# Patient Record
Sex: FEMALE | Race: BLACK OR AFRICAN AMERICAN | NOT HISPANIC OR LATINO | Employment: FULL TIME | URBAN - METROPOLITAN AREA
[De-identification: names, ages, dates, MRNs, and addresses within clinical notes are randomized per-mention and may not be internally consistent; named-entity substitution may affect disease eponyms.]

---

## 2017-10-16 RX ORDER — FLUCONAZOLE 150 MG/1
150 TABLET ORAL DAILY
Qty: 1 TABLET | Refills: 0 | OUTPATIENT
Start: 2017-10-16 | End: 2017-10-17

## 2018-02-19 ENCOUNTER — OFFICE VISIT (OUTPATIENT)
Dept: URGENT CARE | Facility: CLINIC | Age: 38
End: 2018-02-19
Payer: COMMERCIAL

## 2018-02-19 VITALS
TEMPERATURE: 98 F | RESPIRATION RATE: 18 BRPM | SYSTOLIC BLOOD PRESSURE: 116 MMHG | BODY MASS INDEX: 27.6 KG/M2 | WEIGHT: 150 LBS | HEIGHT: 62 IN | DIASTOLIC BLOOD PRESSURE: 80 MMHG | OXYGEN SATURATION: 98 % | HEART RATE: 80 BPM

## 2018-02-19 DIAGNOSIS — H83.09 ACUTE LABYRINTHITIS, UNSPECIFIED LATERALITY: Primary | ICD-10-CM

## 2018-02-19 PROCEDURE — 3008F BODY MASS INDEX DOCD: CPT | Mod: S$GLB,,, | Performed by: FAMILY MEDICINE

## 2018-02-19 PROCEDURE — 96372 THER/PROPH/DIAG INJ SC/IM: CPT | Mod: S$GLB,,, | Performed by: FAMILY MEDICINE

## 2018-02-19 PROCEDURE — 99203 OFFICE O/P NEW LOW 30 MIN: CPT | Mod: 25,S$GLB,, | Performed by: FAMILY MEDICINE

## 2018-02-19 RX ORDER — AZITHROMYCIN 250 MG/1
TABLET, FILM COATED ORAL
Qty: 6 TABLET | Refills: 0 | Status: SHIPPED | OUTPATIENT
Start: 2018-02-19 | End: 2020-11-13

## 2018-02-19 RX ORDER — MECLIZINE HYDROCHLORIDE 25 MG/1
25 TABLET ORAL 3 TIMES DAILY PRN
Qty: 20 TABLET | Refills: 0 | Status: SHIPPED | OUTPATIENT
Start: 2018-02-19 | End: 2020-11-13

## 2018-02-19 RX ORDER — BETAMETHASONE SODIUM PHOSPHATE AND BETAMETHASONE ACETATE 3; 3 MG/ML; MG/ML
6 INJECTION, SUSPENSION INTRA-ARTICULAR; INTRALESIONAL; INTRAMUSCULAR; SOFT TISSUE
Status: COMPLETED | OUTPATIENT
Start: 2018-02-19 | End: 2018-02-19

## 2018-02-19 RX ADMIN — BETAMETHASONE SODIUM PHOSPHATE AND BETAMETHASONE ACETATE 6 MG: 3; 3 INJECTION, SUSPENSION INTRA-ARTICULAR; INTRALESIONAL; INTRAMUSCULAR; SOFT TISSUE at 04:02

## 2018-02-19 NOTE — PATIENT INSTRUCTIONS
Labyrinthitis    The inner ear is located behind the middle ear. It is part of the balance center of your body. When the inner ear becomes irritated or inflamed it causes a condition known as labyrinthitis. It may due to a viral infection, but often a cause is not found. Labyrinthitis causes sudden dizziness and balance problems. It often causes a feeling that you or the room is spinning (vertigo). You may feel nauseated or throw up. You may also feel a loss of balance when trying to walk. Head movement from side to side or changes in body position (from lying to sitting or standing) may worsen symptoms. You may have ringing in the ear. Hearing may also be affected.  An episode of labyrinthitis may last seconds, minutes or hours. It may never return. Or symptoms may recur off and on over several weeks or longer. In many cases, the problem is short-term and goes away when the inner ear issue resolves.  Home care  · Take medicine as prescribed to relieve your symptoms. Unless another medicine was prescribed, you can try over-the-counter motion sickness pills. Note that these medicines may cause drowsiness.  · If symptoms are severe, rest quietly in bed. Change positions slowly. There may be one position feels best, such as lying on one side or lying on your back with your head slightly raised on pillows.  · Vestibular rehabilitation exercises involve moving the head to help resolve problems in the inner ear. If these exercises have been prescribed, do them as you have been instructed.  · Do not drive or work with dangerous machinery until one week has passed without symptoms. Be cautious about using stairs.  Follow-up care  Follow up with your healthcare provider or as advised by our staff.  When to seek medical advice  Call your healthcare provider for any of the following:  · Symptoms that are not controlled by medicine or that get worse  · Repeated vomiting that is not relieved by medicine  · Increased weakness  or fainting  · Headache or unusual drowsiness  · Difficulty with vision or speech  · Trouble moving the face, arms or legs  · Hearing loss  · Symptoms persist beyond a few days  Date Last Reviewed: 4/26/2015  © 6511-3477 [employer]. [address] [place] [address], [place] [address]. All rights reserved. This information is not intended as a substitute for professional medical care. Always follow your healthcare professional's instructions.    Tato KITCHEN was seen today for dizziness.    Diagnoses and all orders for this visit:    Acute labyrinthitis, unspecified laterality  -     betamethasone acetate-betamethasone sodium phosphate injection 6 mg; Inject 1 mL (6 mg total) into the muscle one time.  -     meclizine (ANTIVERT) 25 mg tablet; Take 1 tablet (25 mg total) by mouth 3 (three) times daily as needed for Dizziness.  -     azithromycin (ZITHROMAX Z-DUONG) 250 MG tablet; Take 2 tablets (500 mg) on  Day 1,  followed by 1 tablet (250 mg) once daily on Days 2 through 5.            Follow Up Comments   Make sure that you follow up with your primary care doctor in the next 2-5 days if needed .  Return to the Urgent Care if signs or symptoms change and certainly if you have worsening symptoms go to the nearest emergency department for further evaluation.     Jessica Arboleda MD

## 2018-02-19 NOTE — PROGRESS NOTES
"Subjective:       Patient ID: Tato Godinez is a 37 y.o. female.    Vitals:  height is 5' 2" (1.575 m) and weight is 68 kg (150 lb). Her temperature is 97.8 °F (36.6 °C). Her blood pressure is 116/80 and her pulse is 80. Her respiration is 18 and oxygen saturation is 98%.     Chief Complaint: Dizziness    Dizziness:   Chronicity:  New  Onset:  Yesterday  Progression since onset:  Unchanged  Frequency:  Every few hours  Duration:  1 minute   Associated symptoms: nausea and weakness.no fever, no headaches, no tinnitus and no vomiting.  Aggravated by:  Nothing  Treatments tried:  Nothing    Review of Systems   Constitution: Positive for weakness. Negative for chills and fever.   HENT: Negative for congestion and tinnitus.    Eyes: Negative for blurred vision and photophobia.   Skin: Negative for rash.   Musculoskeletal: Negative for neck pain.   Gastrointestinal: Positive for nausea. Negative for vomiting.   Neurological: Positive for dizziness. Negative for disturbances in coordination, headaches, numbness and seizures.   Psychiatric/Behavioral: Negative for altered mental status. The patient is not nervous/anxious.        Objective:      Physical Exam   Constitutional: She is oriented to person, place, and time. She appears well-developed and well-nourished. She is cooperative.  Non-toxic appearance. She does not appear ill. No distress.   Patient is carrying a blanket and appears uncomfortable.    HENT:   Head: Normocephalic and atraumatic.   Right Ear: Hearing, external ear and ear canal normal. A middle ear effusion is present.   Left Ear: Hearing, external ear and ear canal normal. A middle ear effusion is present.   Nose: Nose normal. No mucosal edema, rhinorrhea or nasal deformity. No epistaxis. Right sinus exhibits no maxillary sinus tenderness and no frontal sinus tenderness. Left sinus exhibits no maxillary sinus tenderness and no frontal sinus tenderness.   Mouth/Throat: Uvula is midline, oropharynx is " clear and moist and mucous membranes are normal. No trismus in the jaw. Normal dentition. No uvula swelling. No posterior oropharyngeal erythema.   Eyes: Conjunctivae and lids are normal. Right eye exhibits no discharge. Left eye exhibits no discharge. No scleral icterus. Right eye exhibits normal extraocular motion. Left eye exhibits normal extraocular motion.   Sclera clear bilat  I was able to reproduce her dizzy sensation with EOM examination.  Her symptoms reproduced with upward and downward motion of the eyes.  NO nystagmus.    Neck: Trachea normal, full passive range of motion without pain and phonation normal. Neck supple.   Cardiovascular: Normal rate, regular rhythm, normal heart sounds, intact distal pulses and normal pulses.    Pulmonary/Chest: Effort normal and breath sounds normal. No respiratory distress.   Abdominal: Soft. Normal appearance and bowel sounds are normal. She exhibits no distension. There is no tenderness.   Musculoskeletal: Normal range of motion. She exhibits no edema or deformity.   Lymphadenopathy:        Head (right side): No submental and no submandibular adenopathy present.        Head (left side): No submental and no submandibular adenopathy present.     She has no cervical adenopathy.   Neurological: She is alert and oriented to person, place, and time. She has normal strength and normal reflexes. She is not disoriented. No cranial nerve deficit. She exhibits normal muscle tone. She displays a negative Romberg sign. Coordination and gait normal.   Negative Romberg.  Good nose to finger and good fine hand coordination.     Skin: Skin is warm, dry and intact. She is not diaphoretic. No pallor.   Psychiatric: She has a normal mood and affect. Her speech is normal and behavior is normal. Judgment and thought content normal. Cognition and memory are normal.   Nursing note and vitals reviewed.      Assessment:       1. Acute labyrinthitis, unspecified laterality        Plan:          Acute labyrinthitis, unspecified laterality  -     betamethasone acetate-betamethasone sodium phosphate injection 6 mg; Inject 1 mL (6 mg total) into the muscle one time.  -     meclizine (ANTIVERT) 25 mg tablet; Take 1 tablet (25 mg total) by mouth 3 (three) times daily as needed for Dizziness.  Dispense: 20 tablet; Refill: 0  -     azithromycin (ZITHROMAX Z-DUONG) 250 MG tablet; Take 2 tablets (500 mg) on  Day 1,  followed by 1 tablet (250 mg) once daily on Days 2 through 5.  Dispense: 6 tablet; Refill: 0      Follow Up Comments   Make sure that you follow up with your primary care doctor in the next 2-5 days if needed .  Return to the Urgent Care if signs or symptoms change and certainly if you have worsening symptoms go to the nearest emergency department for further evaluation.

## 2020-03-25 ENCOUNTER — TELEPHONE (OUTPATIENT)
Dept: PRIMARY CARE CLINIC | Facility: CLINIC | Age: 40
End: 2020-03-25

## 2020-08-25 ENCOUNTER — OFFICE VISIT (OUTPATIENT)
Dept: OBSTETRICS AND GYNECOLOGY | Facility: CLINIC | Age: 40
End: 2020-08-25
Payer: MEDICAID

## 2020-08-25 VITALS
WEIGHT: 176.81 LBS | DIASTOLIC BLOOD PRESSURE: 72 MMHG | SYSTOLIC BLOOD PRESSURE: 130 MMHG | BODY MASS INDEX: 32.34 KG/M2

## 2020-08-25 DIAGNOSIS — Z01.419 WOMEN'S ANNUAL ROUTINE GYNECOLOGICAL EXAMINATION: Primary | ICD-10-CM

## 2020-08-25 PROCEDURE — 87491 CHLMYD TRACH DNA AMP PROBE: CPT

## 2020-08-25 PROCEDURE — 99213 OFFICE O/P EST LOW 20 MIN: CPT | Mod: PBBFAC,PO | Performed by: ADVANCED PRACTICE MIDWIFE

## 2020-08-25 PROCEDURE — 99385 PR PREVENTIVE VISIT,NEW,18-39: ICD-10-PCS | Mod: S$PBB,,, | Performed by: ADVANCED PRACTICE MIDWIFE

## 2020-08-25 PROCEDURE — 99385 PREV VISIT NEW AGE 18-39: CPT | Mod: S$PBB,,, | Performed by: ADVANCED PRACTICE MIDWIFE

## 2020-08-25 PROCEDURE — 99999 PR PBB SHADOW E&M-EST. PATIENT-LVL III: ICD-10-PCS | Mod: PBBFAC,,, | Performed by: ADVANCED PRACTICE MIDWIFE

## 2020-08-25 PROCEDURE — 99999 PR PBB SHADOW E&M-EST. PATIENT-LVL III: CPT | Mod: PBBFAC,,, | Performed by: ADVANCED PRACTICE MIDWIFE

## 2020-08-25 NOTE — PROGRESS NOTES
HISTORY OF PRESENT ILLNESS:    Tato Godinez is a 39 y.o. female, , Patient's last menstrual period was 08/10/2020 (exact date).,  presents for a routine annual exam . Pt has no complaints or concerns. Pt reports hx of chronic bacterial vaginosis but usus boric acid PRN and that usually clears it up. Pt does not desire a BCM and is OK with a pregnancy if it happened. Pt reports last Pap was normal in 2018    History reviewed. No pertinent past medical history.    Past Surgical History:   Procedure Laterality Date     SECTION      HAND SURGERY         MEDICATIONS AND ALLERGIES:      Current Outpatient Medications:     meclizine (ANTIVERT) 25 mg tablet, Take 1 tablet (25 mg total) by mouth 3 (three) times daily as needed for Dizziness., Disp: 20 tablet, Rfl: 0    metronidazole (METROGEL) 0.75 % vaginal gel, Place 1 applicator vaginally every evening. 1 applicator vaginally every night at bedtime x 5 nights, then insert 1 applicator every wk until tube is finished, Disp: 1 applicator, Rfl: 0    azithromycin (ZITHROMAX Z-DUONG) 250 MG tablet, Take 2 tablets (500 mg) on  Day 1,  followed by 1 tablet (250 mg) once daily on Days 2 through 5. (Patient not taking: Reported on 2020), Disp: 6 tablet, Rfl: 0    Review of patient's allergies indicates:  No Known Allergies    Family History   Problem Relation Age of Onset    Diabetes Maternal Grandmother     Cancer Maternal Grandmother        Social History     Socioeconomic History    Marital status:      Spouse name: Not on file    Number of children: Not on file    Years of education: Not on file    Highest education level: Not on file   Occupational History    Not on file   Social Needs    Financial resource strain: Not on file    Food insecurity     Worry: Not on file     Inability: Not on file    Transportation needs     Medical: Not on file     Non-medical: Not on file   Tobacco Use    Smoking status: Never Smoker    Smokeless  tobacco: Never Used   Substance and Sexual Activity    Alcohol use: Yes     Comment: socially    Drug use: No    Sexual activity: Yes     Partners: Male     Birth control/protection: Condom   Lifestyle    Physical activity     Days per week: Not on file     Minutes per session: Not on file    Stress: Not on file   Relationships    Social connections     Talks on phone: Not on file     Gets together: Not on file     Attends [employer] service: Not on file     Active member of club or organization: Not on file     Attends meetings of clubs or organizations: Not on file     Relationship status: Not on file   Other Topics Concern    Not on file   Social History Narrative    Not on file       COMPREHENSIVE GYN HISTORY:  PAP History: Denies abnormal Paps.  Infection History: Denies STDs. Denies PID.  Benign History: Denies uterine fibroids. Denies ovarian cysts. Denies endometriosis. Denies other conditions.  Cancer History: Denies cervical cancer. Denies uterine cancer or hyperplasia. Denies ovarian cancer. Denies vulvar cancer or pre-cancer. Denies vaginal cancer or pre-cancer. Denies breast cancer. Denies colon cancer.  Sexual Activity History:  currently  sexually active  Menstrual History: Monthly  Dysmenorrhea History:None  Contraception:None    ROS:  GENERAL: No weight changes. No swelling. No fatigue. No fever.  CARDIOVASCULAR: No chest pain. No shortness of breath. No leg cramps.   NEUROLOGICAL: No headaches. No vision changes.  BREASTS: No pain. No lumps. No discharge.  ABDOMEN: No pain. No nausea. No vomiting. No diarrhea. No constipation.  REPRODUCTIVE: No abnormal bleeding.   VULVA: No pain. No lesions. No itching.  VAGINA: No relaxation. No itching. No odor. No lesions. Hx BV  URINARY: No incontinence. No nocturia. No frequency. No dysuria.    /72   Wt 80.2 kg (176 lb 12.9 oz)   LMP 08/10/2020 (Exact Date)   BMI 32.34 kg/m²     PE:  APPEARANCE: Well nourished, well developed, in no acute  distress.  AFFECT: WNL, alert and oriented x 3. Interactive during exam  SKIN: No acne or hirsutism.  NECK: Neck symmetric, without masses or thyromegaly.  NODES: No inguinal, axillary or femoral lymph node enlargement.  CHEST: Good respiratory effort.   ABDOMEN: Soft. No tenderness or masses. No hepatosplenomegaly. No hernias.  BREASTS: Pendulous, Symmetrical, no skin changes, visible lesions, palpable masses or nipple discharge bilaterally.  PELVIC: External female genitalia without lesions.  Female hair distribution. Adequate perineal body, Normal urethral meatus. Vagina moist and well rugated without lesions or discharge.  No significant cystocele or rectocele present. Cervix pink without lesions, discharge or tenderness. Uterus is 4-6 week size, regular, mobile and nontender. Adnexa without masses or tenderness.  EXTREMITIES: No edema    PROCEDURES:      DIAGNOSIS:  1. Gyn exam    LABS AND TESTS ORDERED:DNA probe for GC/chlam, AFFIRM    MEDICATIONS PRESCRIBED:None    COUNSELING:  The patient was counseled today on:  -A.C.S. Pap and pelvic exam guidelines,  monthly self breast exams and to follow up with her PCP for other health maintenance.  Discussed probiotic use and given info on appropriate ones for vaginal health.  Records request signed for Pap records    FOLLOW-UP  annually.

## 2020-08-27 LAB
CANDIDA RRNA VAG QL PROBE: NEGATIVE
G VAGINALIS RRNA GENITAL QL PROBE: POSITIVE
T VAGINALIS RRNA GENITAL QL PROBE: NEGATIVE

## 2020-09-23 ENCOUNTER — TELEPHONE (OUTPATIENT)
Dept: OBSTETRICS AND GYNECOLOGY | Facility: CLINIC | Age: 40
End: 2020-09-23

## 2020-09-23 DIAGNOSIS — N76.0 BV (BACTERIAL VAGINOSIS): Primary | ICD-10-CM

## 2020-09-23 DIAGNOSIS — B96.89 BV (BACTERIAL VAGINOSIS): Primary | ICD-10-CM

## 2020-09-23 RX ORDER — METRONIDAZOLE 500 MG/1
500 TABLET ORAL EVERY 12 HOURS
Qty: 14 TABLET | Refills: 0 | Status: SHIPPED | OUTPATIENT
Start: 2020-09-23 | End: 2020-09-30

## 2020-09-25 LAB
C TRACH DNA SPEC QL NAA+PROBE: NOT DETECTED
N GONORRHOEA DNA SPEC QL NAA+PROBE: NOT DETECTED

## 2020-11-13 ENCOUNTER — OFFICE VISIT (OUTPATIENT)
Dept: FAMILY MEDICINE | Facility: HOSPITAL | Age: 40
End: 2020-11-13
Attending: SPECIALIST
Payer: MEDICAID

## 2020-11-13 VITALS
BODY MASS INDEX: 32.7 KG/M2 | DIASTOLIC BLOOD PRESSURE: 66 MMHG | HEART RATE: 62 BPM | SYSTOLIC BLOOD PRESSURE: 109 MMHG | HEIGHT: 62 IN | WEIGHT: 177.69 LBS

## 2020-11-13 DIAGNOSIS — Z00.00 ENCOUNTER FOR ANNUAL HEALTH EXAMINATION: Primary | ICD-10-CM

## 2020-11-13 DIAGNOSIS — N89.8 VAGINAL DISCHARGE: ICD-10-CM

## 2020-11-13 PROCEDURE — 99213 OFFICE O/P EST LOW 20 MIN: CPT | Performed by: STUDENT IN AN ORGANIZED HEALTH CARE EDUCATION/TRAINING PROGRAM

## 2020-11-13 RX ORDER — METRONIDAZOLE 500 MG/1
500 TABLET ORAL EVERY 12 HOURS
Qty: 14 TABLET | Refills: 0 | Status: SHIPPED | OUTPATIENT
Start: 2020-11-13 | End: 2020-11-20 | Stop reason: SDUPTHER

## 2020-11-13 NOTE — PROGRESS NOTES
Subjective:       Patient ID: Tato Godinez is a 39 y.o. female.    Chief Complaint: Establish Care, Leg Swelling, and Flu Vaccine    Tato Godinez is a 40 yo F w/ no PMH, presenting to [facility]. Pt had positive gardnerella infection in August, completed course of flagyl. Endorsing a vaginal discharge today, different than discharge in August. Whitish/thin discharge, no smell. Denies vaginal itchiness, dysuria, urinary issues, dyspareunia. Denies F/C, N/V, HA, lightheadedness, dizziness, acute changes in vision, congestion, rhinorrhea, cough, sore throat, difficulty breathing, CP, SOB, AP, bowel/bladder issues, difficulty ambulating, rashes or wounds.     PMH-none  Meds-none  [id number], R carpal tunnel release,   Ob/Gyn-Established w/ [facility], not on contraception. Hx of BV.   All-none  FH-Diabetes and HTN in numerous family members    Review of Systems   Constitutional: Negative for chills, fatigue and fever.   HENT: Negative for congestion, rhinorrhea and sore throat.    Eyes: Negative for visual disturbance.   Respiratory: Negative for cough and shortness of breath.    Cardiovascular: Positive for leg swelling (w/ prolonged standing/ambulation while at work). Negative for chest pain.   Gastrointestinal: Negative for abdominal pain, constipation, diarrhea, nausea and vomiting.   Endocrine: Negative.    Genitourinary: Positive for vaginal discharge (thin/whitish). Negative for difficulty urinating, dysuria, frequency, genital sores, hematuria, menstrual problem, pelvic pain and vaginal pain.   Musculoskeletal: Negative for gait problem.   Skin: Negative for rash and wound.   Neurological: Negative for dizziness, weakness, light-headedness and headaches.   Psychiatric/Behavioral: Negative.        Objective:      Vitals:    20 1015   BP: 109/66   Pulse: 62     Physical Exam  Vitals signs and nursing note reviewed. Exam conducted with a chaperone present.   Constitutional:       General: She is not  in acute distress.     Appearance: Normal appearance. She is not ill-appearing or toxic-appearing.   Eyes:      General:         Right eye: No discharge.         Left eye: No discharge.      Conjunctiva/sclera: Conjunctivae normal.   Cardiovascular:      Rate and Rhythm: Normal rate and regular rhythm.      Pulses: Normal pulses.      Heart sounds: Normal heart sounds.   Pulmonary:      Effort: Pulmonary effort is normal. No respiratory distress.      Breath sounds: Normal breath sounds. No wheezing.   Abdominal:      General: Bowel sounds are normal.      Palpations: Abdomen is soft.      Tenderness: There is no abdominal tenderness.   Genitourinary:     General: Normal vulva.      Vagina: No vaginal discharge.      Comments: No cervical motion tenderness on bimanual exam. No cervical discharge appreciated on speculum exam.   Musculoskeletal:      Right lower leg: No edema.      Left lower leg: No edema.   Skin:     General: Skin is warm.      Capillary Refill: Capillary refill takes less than 2 seconds.   Neurological:      Mental Status: She is alert and oriented to person, place, and time.   Psychiatric:         Mood and Affect: Mood normal.         Behavior: Behavior normal.         Assessment:       1. Encounter for annual health examination    2. Vaginal discharge        Plan:       Encounter for annual health examination  -     CBC Auto Differential; Future; Expected date: 11/13/2020  -     Comprehensive Metabolic Panel; Future; Expected date: 11/13/2020  -     Hemoglobin A1C; Future; Expected date: 11/13/2020  -     Lipid Panel; Future; Expected date: 11/13/2020  -     TSH; Future; Expected date: 11/13/2020  -     HIV 1/2 Ag/Ab (4th Gen); Future; Expected date: 11/13/2020  -     Hepatitis C Antibody; Future; Expected date: 11/13/2020    Vaginal discharge    Other orders  -     metroNIDAZOLE (FLAGYL) 500 MG tablet; Take 1 tablet (500 mg total) by mouth every 12 (twelve) hours. for 7 days  Dispense: 14  tablet; Refill: 0    On in-office microscopy of cervical swab, no clue cells/flagellated organism identified on slide. Sent swab of cervical fluid for testing. Empirically treating for possible BV. F/u at next visit if pt established f/u with Ob/Gyn. Ob/Gyn awaiting Pap Smear hx for previous clinic.    Follow up in about 4 weeks (around 12/11/2020) for labwork results and f/u discharge and pap smear hx.

## 2020-11-19 ENCOUNTER — TELEPHONE (OUTPATIENT)
Dept: FAMILY MEDICINE | Facility: HOSPITAL | Age: 40
End: 2020-11-19

## 2020-11-19 NOTE — TELEPHONE ENCOUNTER
----- Message from Agnieszka Gonsalves sent at 11/19/2020  3:29 PM CST -----  PT CALL WHEN TO GET HER LABS DONE AND THEY ARE IN WRONG TAB. SHE WENT TO [facility] THEY TOLD HER THEY DO NOT HAVE HER metroNIDAZOLE (FLAGYL) 500 MG tablet.... PT # [phone] CAN SOMEONE PLEASE GIVE PATIENT A CALL TO LET HER KNOW ONCE IT'S COMPLETE.

## 2020-11-19 NOTE — TELEPHONE ENCOUNTER
----- Message from Misty Guadalupe sent at 11/19/2020  2:12 PM CST -----  Pt needs dr to re put orders in for labs. Please call pt back when orders are in. [phone]

## 2020-11-20 ENCOUNTER — TELEPHONE (OUTPATIENT)
Dept: FAMILY MEDICINE | Facility: HOSPITAL | Age: 40
End: 2020-11-20

## 2020-11-20 DIAGNOSIS — Z00.00 ENCOUNTER FOR ANNUAL HEALTH EXAMINATION: Primary | ICD-10-CM

## 2020-11-20 RX ORDER — METRONIDAZOLE 500 MG/1
500 TABLET ORAL EVERY 12 HOURS
Qty: 14 TABLET | Refills: 0 | Status: SHIPPED | OUTPATIENT
Start: 2020-11-20 | End: 2020-11-27

## 2020-11-20 NOTE — TELEPHONE ENCOUNTER
Called patient to notify new lab work orders and sent Flagyl prescription to pharmacy. Pt verbalized understanding.

## 2020-11-25 ENCOUNTER — LAB VISIT (OUTPATIENT)
Dept: LAB | Facility: HOSPITAL | Age: 40
End: 2020-11-25
Attending: STUDENT IN AN ORGANIZED HEALTH CARE EDUCATION/TRAINING PROGRAM
Payer: MEDICAID

## 2020-11-25 DIAGNOSIS — Z00.00 ENCOUNTER FOR ANNUAL HEALTH EXAMINATION: ICD-10-CM

## 2020-11-25 LAB
ALBUMIN SERPL BCP-MCNC: 3.9 G/DL (ref 3.5–5.2)
ALP SERPL-CCNC: 62 U/L (ref 55–135)
ALT SERPL W/O P-5'-P-CCNC: 10 U/L (ref 10–44)
ANION GAP SERPL CALC-SCNC: 7 MMOL/L (ref 8–16)
AST SERPL-CCNC: 16 U/L (ref 10–40)
BASOPHILS # BLD AUTO: 0.05 K/UL (ref 0–0.2)
BASOPHILS NFR BLD: 0.6 % (ref 0–1.9)
BILIRUB SERPL-MCNC: 0.4 MG/DL (ref 0.1–1)
BUN SERPL-MCNC: 9 MG/DL (ref 6–20)
CALCIUM SERPL-MCNC: 9.7 MG/DL (ref 8.7–10.5)
CHLORIDE SERPL-SCNC: 107 MMOL/L (ref 95–110)
CHOLEST SERPL-MCNC: 187 MG/DL (ref 120–199)
CHOLEST/HDLC SERPL: 2.5 {RATIO} (ref 2–5)
CO2 SERPL-SCNC: 27 MMOL/L (ref 23–29)
CREAT SERPL-MCNC: 0.9 MG/DL (ref 0.5–1.4)
DIFFERENTIAL METHOD: NORMAL
EOSINOPHIL # BLD AUTO: 0 K/UL (ref 0–0.5)
EOSINOPHIL NFR BLD: 0.5 % (ref 0–8)
ERYTHROCYTE [DISTWIDTH] IN BLOOD BY AUTOMATED COUNT: 13.5 % (ref 11.5–14.5)
EST. GFR  (AFRICAN AMERICAN): >60 ML/MIN/1.73 M^2
EST. GFR  (NON AFRICAN AMERICAN): >60 ML/MIN/1.73 M^2
ESTIMATED AVG GLUCOSE: 105 MG/DL (ref 68–131)
GLUCOSE SERPL-MCNC: 80 MG/DL (ref 70–110)
HBA1C MFR BLD HPLC: 5.3 % (ref 4–5.6)
HCT VFR BLD AUTO: 37.6 % (ref 37–48.5)
HDLC SERPL-MCNC: 76 MG/DL (ref 40–75)
HDLC SERPL: 40.6 % (ref 20–50)
HGB BLD-MCNC: 12.2 G/DL (ref 12–16)
IMM GRANULOCYTES # BLD AUTO: 0.02 K/UL (ref 0–0.04)
IMM GRANULOCYTES NFR BLD AUTO: 0.3 % (ref 0–0.5)
LDLC SERPL CALC-MCNC: 99 MG/DL (ref 63–159)
LYMPHOCYTES # BLD AUTO: 2.1 K/UL (ref 1–4.8)
LYMPHOCYTES NFR BLD: 26.6 % (ref 18–48)
MCH RBC QN AUTO: 30.4 PG (ref 27–31)
MCHC RBC AUTO-ENTMCNC: 32.4 G/DL (ref 32–36)
MCV RBC AUTO: 94 FL (ref 82–98)
MONOCYTES # BLD AUTO: 0.8 K/UL (ref 0.3–1)
MONOCYTES NFR BLD: 10.2 % (ref 4–15)
NEUTROPHILS # BLD AUTO: 4.8 K/UL (ref 1.8–7.7)
NEUTROPHILS NFR BLD: 61.8 % (ref 38–73)
NONHDLC SERPL-MCNC: 111 MG/DL
NRBC BLD-RTO: 0 /100 WBC
PLATELET # BLD AUTO: 315 K/UL (ref 150–350)
PMV BLD AUTO: 10 FL (ref 9.2–12.9)
POTASSIUM SERPL-SCNC: 3.7 MMOL/L (ref 3.5–5.1)
PROT SERPL-MCNC: 7.7 G/DL (ref 6–8.4)
RBC # BLD AUTO: 4.01 M/UL (ref 4–5.4)
SODIUM SERPL-SCNC: 141 MMOL/L (ref 136–145)
TRIGL SERPL-MCNC: 60 MG/DL (ref 30–150)
TSH SERPL DL<=0.005 MIU/L-ACNC: 1.38 UIU/ML (ref 0.4–4)
WBC # BLD AUTO: 7.75 K/UL (ref 3.9–12.7)

## 2020-11-25 PROCEDURE — 83036 HEMOGLOBIN GLYCOSYLATED A1C: CPT

## 2020-11-25 PROCEDURE — 80061 LIPID PANEL: CPT

## 2020-11-25 PROCEDURE — 86803 HEPATITIS C AB TEST: CPT

## 2020-11-25 PROCEDURE — 86703 HIV-1/HIV-2 1 RESULT ANTBDY: CPT

## 2020-11-25 PROCEDURE — 85025 COMPLETE CBC W/AUTO DIFF WBC: CPT

## 2020-11-25 PROCEDURE — 36415 COLL VENOUS BLD VENIPUNCTURE: CPT

## 2020-11-25 PROCEDURE — 84443 ASSAY THYROID STIM HORMONE: CPT

## 2020-11-25 PROCEDURE — 80053 COMPREHEN METABOLIC PANEL: CPT

## 2020-11-27 LAB
HCV AB SERPL QL IA: NEGATIVE
HIV 1+2 AB+HIV1 P24 AG SERPL QL IA: NEGATIVE

## 2021-03-31 ENCOUNTER — OFFICE VISIT (OUTPATIENT)
Dept: OBSTETRICS AND GYNECOLOGY | Facility: CLINIC | Age: 41
End: 2021-03-31
Payer: MEDICAID

## 2021-03-31 VITALS
WEIGHT: 175.25 LBS | SYSTOLIC BLOOD PRESSURE: 116 MMHG | BODY MASS INDEX: 32.25 KG/M2 | DIASTOLIC BLOOD PRESSURE: 64 MMHG | HEIGHT: 62 IN

## 2021-03-31 DIAGNOSIS — N76.0 ACUTE VAGINITIS: Primary | ICD-10-CM

## 2021-03-31 PROCEDURE — 99213 PR OFFICE/OUTPT VISIT, EST, LEVL III, 20-29 MIN: ICD-10-PCS | Mod: S$PBB,,, | Performed by: ADVANCED PRACTICE MIDWIFE

## 2021-03-31 PROCEDURE — 99999 PR PBB SHADOW E&M-EST. PATIENT-LVL II: ICD-10-PCS | Mod: PBBFAC,,, | Performed by: ADVANCED PRACTICE MIDWIFE

## 2021-03-31 PROCEDURE — 87481 CANDIDA DNA AMP PROBE: CPT | Mod: 59 | Performed by: ADVANCED PRACTICE MIDWIFE

## 2021-03-31 PROCEDURE — 99213 OFFICE O/P EST LOW 20 MIN: CPT | Mod: S$PBB,,, | Performed by: ADVANCED PRACTICE MIDWIFE

## 2021-03-31 PROCEDURE — 99999 PR PBB SHADOW E&M-EST. PATIENT-LVL II: CPT | Mod: PBBFAC,,, | Performed by: ADVANCED PRACTICE MIDWIFE

## 2021-03-31 PROCEDURE — 99212 OFFICE O/P EST SF 10 MIN: CPT | Mod: PBBFAC,PN | Performed by: ADVANCED PRACTICE MIDWIFE

## 2021-04-05 LAB
BACTERIAL VAGINOSIS DNA: POSITIVE
CANDIDA GLABRATA DNA: NEGATIVE
CANDIDA KRUSEI DNA: NEGATIVE
CANDIDA RRNA VAG QL PROBE: NEGATIVE
T VAGINALIS RRNA GENITAL QL PROBE: NEGATIVE

## 2021-04-06 ENCOUNTER — PATIENT MESSAGE (OUTPATIENT)
Dept: OBSTETRICS AND GYNECOLOGY | Facility: CLINIC | Age: 41
End: 2021-04-06

## 2021-04-06 DIAGNOSIS — B96.89 BV (BACTERIAL VAGINOSIS): Primary | ICD-10-CM

## 2021-04-06 DIAGNOSIS — N76.0 BV (BACTERIAL VAGINOSIS): Primary | ICD-10-CM

## 2021-04-06 RX ORDER — METRONIDAZOLE 500 MG/1
500 TABLET ORAL EVERY 12 HOURS
Qty: 14 TABLET | Refills: 0 | Status: SHIPPED | OUTPATIENT
Start: 2021-04-06 | End: 2021-04-13

## 2021-05-04 ENCOUNTER — PATIENT MESSAGE (OUTPATIENT)
Dept: OBSTETRICS AND GYNECOLOGY | Facility: CLINIC | Age: 41
End: 2021-05-04

## 2021-05-04 DIAGNOSIS — N76.1 SUBACUTE VAGINITIS: Primary | ICD-10-CM

## 2021-05-04 RX ORDER — METRONIDAZOLE 7.5 MG/G
1 GEL VAGINAL DAILY
Qty: 70 G | Refills: 0 | Status: SHIPPED | OUTPATIENT
Start: 2021-05-04 | End: 2021-05-10

## 2021-05-05 ENCOUNTER — PATIENT MESSAGE (OUTPATIENT)
Dept: OBSTETRICS AND GYNECOLOGY | Facility: CLINIC | Age: 41
End: 2021-05-05

## 2021-05-05 DIAGNOSIS — N76.0 BV (BACTERIAL VAGINOSIS): Primary | ICD-10-CM

## 2021-05-05 DIAGNOSIS — B96.89 BV (BACTERIAL VAGINOSIS): Primary | ICD-10-CM

## 2021-05-07 RX ORDER — METRONIDAZOLE 7.5 MG/G
1 GEL VAGINAL DAILY
Qty: 70 G | Refills: 0 | Status: SHIPPED | OUTPATIENT
Start: 2021-05-07 | End: 2021-05-10

## 2021-05-08 ENCOUNTER — PATIENT MESSAGE (OUTPATIENT)
Dept: OBSTETRICS AND GYNECOLOGY | Facility: CLINIC | Age: 41
End: 2021-05-08

## 2021-05-10 ENCOUNTER — PATIENT MESSAGE (OUTPATIENT)
Dept: OBSTETRICS AND GYNECOLOGY | Facility: CLINIC | Age: 41
End: 2021-05-10

## 2021-05-10 DIAGNOSIS — N76.0 BV (BACTERIAL VAGINOSIS): Primary | ICD-10-CM

## 2021-05-10 DIAGNOSIS — B96.89 BV (BACTERIAL VAGINOSIS): Primary | ICD-10-CM

## 2021-05-10 RX ORDER — METRONIDAZOLE 500 MG/1
500 TABLET ORAL EVERY 12 HOURS
Qty: 14 TABLET | Refills: 0 | Status: SHIPPED | OUTPATIENT
Start: 2021-05-10 | End: 2021-05-17

## 2021-07-28 ENCOUNTER — PATIENT MESSAGE (OUTPATIENT)
Dept: OBSTETRICS AND GYNECOLOGY | Facility: CLINIC | Age: 41
End: 2021-07-28

## 2022-01-11 ENCOUNTER — PATIENT MESSAGE (OUTPATIENT)
Dept: OBSTETRICS AND GYNECOLOGY | Facility: CLINIC | Age: 42
End: 2022-01-11
Payer: MEDICAID

## 2022-01-11 DIAGNOSIS — N76.1 SUBACUTE VAGINITIS: Primary | ICD-10-CM

## 2022-01-12 ENCOUNTER — PATIENT MESSAGE (OUTPATIENT)
Dept: OBSTETRICS AND GYNECOLOGY | Facility: CLINIC | Age: 42
End: 2022-01-12
Payer: MEDICAID

## 2022-01-12 RX ORDER — METRONIDAZOLE 500 MG/1
500 TABLET ORAL EVERY 12 HOURS
Qty: 14 TABLET | Refills: 0 | Status: SHIPPED | OUTPATIENT
Start: 2022-01-12 | End: 2022-01-19

## 2022-01-19 ENCOUNTER — OFFICE VISIT (OUTPATIENT)
Dept: URGENT CARE | Facility: CLINIC | Age: 42
End: 2022-01-19
Payer: OTHER MISCELLANEOUS

## 2022-01-19 VITALS
TEMPERATURE: 98 F | DIASTOLIC BLOOD PRESSURE: 78 MMHG | HEART RATE: 86 BPM | SYSTOLIC BLOOD PRESSURE: 122 MMHG | WEIGHT: 177 LBS | HEIGHT: 62 IN | OXYGEN SATURATION: 98 % | BODY MASS INDEX: 32.57 KG/M2 | RESPIRATION RATE: 17 BRPM

## 2022-01-19 DIAGNOSIS — J02.9 SORE THROAT: Primary | ICD-10-CM

## 2022-01-19 DIAGNOSIS — U07.1 COVID-19: ICD-10-CM

## 2022-01-19 LAB
CTP QC/QA: YES
SARS-COV-2 RDRP RESP QL NAA+PROBE: POSITIVE

## 2022-01-19 PROCEDURE — 99213 OFFICE O/P EST LOW 20 MIN: CPT | Mod: S$GLB,,, | Performed by: INTERNAL MEDICINE

## 2022-01-19 PROCEDURE — U0002 COVID-19 LAB TEST NON-CDC: HCPCS | Mod: QW,S$GLB,, | Performed by: INTERNAL MEDICINE

## 2022-01-19 PROCEDURE — 99213 PR OFFICE/OUTPT VISIT, EST, LEVL III, 20-29 MIN: ICD-10-PCS | Mod: S$GLB,,, | Performed by: INTERNAL MEDICINE

## 2022-01-19 PROCEDURE — U0002: ICD-10-PCS | Mod: QW,S$GLB,, | Performed by: INTERNAL MEDICINE

## 2022-01-19 RX ORDER — BENZONATATE 100 MG/1
100 CAPSULE ORAL 3 TIMES DAILY PRN
Qty: 30 CAPSULE | Refills: 0 | Status: SHIPPED | OUTPATIENT
Start: 2022-01-19 | End: 2022-01-29

## 2022-01-19 RX ORDER — ALBUTEROL SULFATE 90 UG/1
2 AEROSOL, METERED RESPIRATORY (INHALATION) EVERY 6 HOURS PRN
Qty: 18 G | Refills: 0 | Status: SHIPPED | OUTPATIENT
Start: 2022-01-19 | End: 2023-06-13

## 2022-01-19 RX ORDER — PROMETHAZINE HYDROCHLORIDE AND DEXTROMETHORPHAN HYDROBROMIDE 6.25; 15 MG/5ML; MG/5ML
5 SYRUP ORAL EVERY 4 HOURS PRN
Qty: 180 ML | Refills: 0 | Status: SHIPPED | OUTPATIENT
Start: 2022-01-19 | End: 2022-01-29

## 2022-01-20 NOTE — PROGRESS NOTES
Subjective:       Patient ID: Tato Godinez is a 41 y.o. female.    Chief Complaint: Back Pain and Sinus Problem    Pt presents with c o sore throat, dry cough, chest pain, chills, headache, lower back pain, pain with swallowing, body aches x 2 days. Pt recently returned from [facility] on 1/17/22. Covid vaccinated. 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 Treating sx with claritin    Sinus Problem  This is a new problem. The current episode started yesterday. There has been no fever. Associated symptoms include chills, coughing, headaches and a sore throat. Past treatments include oral decongestants.       Constitution: Positive for chills.   HENT: Positive for sore throat.    Respiratory: Positive for cough.    Neurological: Positive for headaches.        Objective:      Physical Exam  Vitals and nursing note reviewed.   Constitutional:       General: She is not in acute distress.     Appearance: She is normal weight. She is not ill-appearing, toxic-appearing or diaphoretic.   HENT:      Head: Normocephalic and atraumatic.      Right Ear: Tympanic membrane normal.      Left Ear: Tympanic membrane normal.      Nose: Nose normal. No congestion or rhinorrhea.      Mouth/Throat:      Mouth: Mucous membranes are moist.      Pharynx: Oropharynx is clear. No oropharyngeal exudate.   Eyes:      Extraocular Movements: Extraocular movements intact.      Conjunctiva/sclera: Conjunctivae normal.      Pupils: Pupils are equal, round, and reactive to light.   Cardiovascular:      Rate and Rhythm: Normal rate and regular rhythm.      Pulses: Normal pulses.      Heart sounds: Normal heart sounds.   Pulmonary:      Effort: Pulmonary effort is normal. No respiratory distress.      Breath sounds: Normal breath sounds. No wheezing.   Abdominal:      General: There is no distension.   Musculoskeletal:      Right lower leg: No edema.      Left lower leg: No edema.   Skin:     Capillary Refill:  Capillary refill takes less than 2 seconds.   Neurological:      General: No focal deficit present.      Mental Status: She is alert. Mental status is at baseline.   Psychiatric:         Mood and Affect: Mood normal.         Behavior: Behavior normal.         Thought Content: Thought content normal.         Assessment:       1. Sore throat    2. COVID-19        Plan:       Isolation precautions discussed in AVS. Medications for cough provided.             No follow-ups on file.

## 2022-01-20 NOTE — PATIENT INSTRUCTIONS
Stay home for 5 days.  If you have no symptoms or your symptoms are resolving after 5 days, you can leave your house.  Continue to wear a mask around others for 5 additional days.  If you have a fever, continue to stay home until your fever resolves.    Use ibuprofen and tylenol for aches/pains fever. Mucinex can be used as an expectorant to help relieve mucus buildup. Antihistamines and flonase can be helpful for nasal congestion and runny nose. Drink lots of fluids. I have enrolled you in the home symptom monitoring program who will call or text you periodically over the next 14 days to monitor your symptoms and triage you to the appropriate location if you are not feeling well. If you are having difficulty breathing, inability to speak in full sentences, go to the ER immediately.     Patient Education       COVID-19 Discharge Instructions   About this topic   Coronavirus disease 2019 is also known as COVID-19. It is a viral illness that infects the lungs. It is caused by a virus called SARS-associated coronavirus (SARS-CoV-2).  The signs of COVID-19 most often start a few days after you have been infected. In some people, it takes longer to show signs. Others never show signs of the infection. You may have a cough, fever, shaking chills and it may be hard to breathe. You may be very tired, have muscle aches, a headache or sore throat. Some people have an upset stomach or loose stools. Others lose their sense of smell or taste. You may not have these signs all the time and they may come and go while you are sick.  The virus spreads easily through droplets when you talk, sneeze, or cough. You can pass the virus to others when you are talking close together, singing, hugging, sharing food, or shaking hands. Doctors believe the germs also survive on surfaces like tables, door handles, and telephones. However, this is not a common way that COVID-19 spreads. Doctors believe you can also spread the infection even if you  dont have any symptoms, but they do not know how that happens. This is why getting vaccinated is one of the best ways to keep you healthy and slow the spread of the virus.  Some people have a mild case of COVID-19 and are able to stay at home and away from others until they feel better. Others may need to be in the hospital if they are very sick. Some people with COVID-19 can have some symptoms for weeks or months. People with COVID-19 must isolate themselves. You can start to be around others when your doctor says it is safe to do so.       What care is needed at home?   1. Ask your doctor what you need to do when you go home. Make sure you ask questions if you do not understand what the doctor says.  2. Drink lots of water, juice, or broth to replace fluids lost from a fever.  3. You may use cool mist humidifiers to help ease congestion and coughing.  4. Use 2 to 3 pillows to prop yourself up when you lie down to make it easier to breathe and sleep.  5. Do not smoke and do not drink beer, wine, and mixed drinks (alcohol).  6. To lower the chance of passing the infection to others, get a COVID-19 vaccine after your infection has resolved.  7. If you have not been fully vaccinated:  ? Wear a mask over your mouth and nose if you are around others who are not sick. Cloth masks work best if they have more than one layer of fabric.  ? Wash your hands often.  ? Stay home in a separate room, if possible, away from others. Only go out to get medical care.  ? Use a separate bathroom if possible.  ? Do not make food for others.  What follow-up care is needed?   · Your doctor may ask you to make visits to the office to check on your progress. Be sure to keep these visits. Make sure you wear a mask at these visits.  · If you can, tell the staff you have COVID-19 ahead of time so they can take extra care to stop the disease from spreading.  · It may take a few weeks before your health returns to normal.  What drugs may be  needed?   The doctor may order drugs to:  · Help with breathing  · Help with fever  · Help with swelling in your airways and lungs  · Control coughing  · Ease a sore throat  · Help a runny or stuffy nose  Will physical activity be limited?   You may have to limit your physical activity. Talk to your doctor about the right amount of activity for you. If you have been very sick with COVID-19, it can take some time to get your strength back.  Will there be any other care needed?   Doctors do not know how long you can pass the virus on to others after you are sick. This is why it is important to stay in a separate room, if possible, when you are sick. For now, doctors are giving general guidelines for you to follow after you have been sick. Before you go around other people, you should:  · Be fever free for 24 hours without taking any drugs to lower the fever  · Have no symptoms of cough or shortness of breath  · Wait at least 10 days after first having symptoms or your first positive test, and you need to be symptom free as above. Some experts suggest waiting 20 days if you have had a more severe infection.  Talk with your doctor about getting a COVID-19 vaccine.  What problems could happen?   · Fluid loss. This is dehydration.  · Short-term or long-term lung damage  · Heart problems  · Death  When do I need to call the doctor?   1. You are having so much trouble breathing that you can only say one or two words at a time.  2. You need to sit upright at all times to be able to breathe and/or cannot lie down.  3. You are very confused or cannot stay awake.  4. Your lips or skin start to turn blue or grey.  5. You think you might be having a medical emergency. Some examples of medical emergencies are:  ? Severe chest pain.  ? Not able to speak or move normally.  6. You have trouble breathing when talking or sitting still.  7. You have new shortness of breath.  8. You become weak or dizzy.  9. You have very dark urine or  do not pass urine for more than 8 hours.  10. You have new or worsening COVID-19 symptoms like:  ? Fever  ? Cough  ? Feeling very tired  ? Shaking chills  ? Headache  ? Trouble swallowing  ? Throwing up  ? Loose stools  ? Reddish purple spots on your fingers or toes  Teach Back: Helping You Understand   The Teach Back Method helps you understand the information we are giving you. After you talk with the staff, tell them in your own words what you learned. This helps to make sure the staff has described each thing clearly. It also helps to explain things that may have been confusing. Before going home, make sure you can do these:  · I can tell you about my condition.  · I can tell you what may help ease my breathing.  · I can tell you what I can do to help avoid passing the infection to others.  · I can tell you what I will do if I have trouble breathing; feel sleepy or confused; or my fingertips, fingernails, skin, or lips are blue.  Where can I learn more?   Centers for Disease Control and Prevention  https://www.cdc.gov/coronavirus/2019-ncov/about/index.html   Centers for Disease Control and Prevention  https://www.cdc.gov/coronavirus/2019-ncov/hcp/disposition-in-home-patients.html   World Health Organization  https://www.who.int/news-room/q-a-detail/e-h-ubnrlrqrmtxbz   Last Reviewed Date   2021-10-05  Consumer Information Use and Disclaimer   This information is not specific medical advice and does not replace information you receive from your health care provider. This is only a brief summary of general information. It does NOT include all information about conditions, illnesses, injuries, tests, procedures, treatments, therapies, discharge instructions or life-style choices that may apply to you. You must talk with your health care provider for complete information about your health and treatment options. This information should not be used to decide whether or not to accept your health care providers advice,  instructions or recommendations. Only your health care provider has the knowledge and training to provide advice that is right for you.  Copyright   Copyright © 2021 [employer] and its affiliates and/or licensors. All rights reserved.

## 2022-04-28 ENCOUNTER — OFFICE VISIT (OUTPATIENT)
Dept: FAMILY MEDICINE | Facility: HOSPITAL | Age: 42
End: 2022-04-28
Attending: FAMILY MEDICINE
Payer: MEDICAID

## 2022-04-28 ENCOUNTER — LAB VISIT (OUTPATIENT)
Dept: LAB | Facility: HOSPITAL | Age: 42
End: 2022-04-28
Attending: FAMILY MEDICINE
Payer: MEDICAID

## 2022-04-28 VITALS
HEART RATE: 72 BPM | HEIGHT: 62 IN | WEIGHT: 186.5 LBS | SYSTOLIC BLOOD PRESSURE: 105 MMHG | BODY MASS INDEX: 34.32 KG/M2 | DIASTOLIC BLOOD PRESSURE: 68 MMHG

## 2022-04-28 DIAGNOSIS — E66.9 CLASS 1 OBESITY WITH BODY MASS INDEX (BMI) OF 34.0 TO 34.9 IN ADULT, UNSPECIFIED OBESITY TYPE, UNSPECIFIED WHETHER SERIOUS COMORBIDITY PRESENT: ICD-10-CM

## 2022-04-28 DIAGNOSIS — M54.6 CHRONIC BILATERAL THORACIC BACK PAIN: ICD-10-CM

## 2022-04-28 DIAGNOSIS — M25.811 SHOULDER IMPINGEMENT, RIGHT: Primary | ICD-10-CM

## 2022-04-28 DIAGNOSIS — G89.29 CHRONIC BILATERAL THORACIC BACK PAIN: ICD-10-CM

## 2022-04-28 LAB
ALBUMIN SERPL BCP-MCNC: 3.7 G/DL (ref 3.5–5.2)
ALP SERPL-CCNC: 60 U/L (ref 55–135)
ALT SERPL W/O P-5'-P-CCNC: 10 U/L (ref 10–44)
ANION GAP SERPL CALC-SCNC: 11 MMOL/L (ref 8–16)
AST SERPL-CCNC: 16 U/L (ref 10–40)
BASOPHILS # BLD AUTO: 0.06 K/UL (ref 0–0.2)
BASOPHILS NFR BLD: 1 % (ref 0–1.9)
BILIRUB SERPL-MCNC: 0.3 MG/DL (ref 0.1–1)
BUN SERPL-MCNC: 11 MG/DL (ref 6–20)
CALCIUM SERPL-MCNC: 9.5 MG/DL (ref 8.7–10.5)
CHLORIDE SERPL-SCNC: 106 MMOL/L (ref 95–110)
CHOLEST SERPL-MCNC: 185 MG/DL (ref 120–199)
CHOLEST/HDLC SERPL: 2.4 {RATIO} (ref 2–5)
CO2 SERPL-SCNC: 23 MMOL/L (ref 23–29)
CREAT SERPL-MCNC: 0.9 MG/DL (ref 0.5–1.4)
DIFFERENTIAL METHOD: ABNORMAL
EOSINOPHIL # BLD AUTO: 0.1 K/UL (ref 0–0.5)
EOSINOPHIL NFR BLD: 1 % (ref 0–8)
ERYTHROCYTE [DISTWIDTH] IN BLOOD BY AUTOMATED COUNT: 14 % (ref 11.5–14.5)
EST. GFR  (AFRICAN AMERICAN): >60 ML/MIN/1.73 M^2
EST. GFR  (NON AFRICAN AMERICAN): >60 ML/MIN/1.73 M^2
ESTIMATED AVG GLUCOSE: 105 MG/DL (ref 68–131)
GLUCOSE SERPL-MCNC: 92 MG/DL (ref 70–110)
HBA1C MFR BLD: 5.3 % (ref 4–5.6)
HCT VFR BLD AUTO: 36.4 % (ref 37–48.5)
HDLC SERPL-MCNC: 76 MG/DL (ref 40–75)
HDLC SERPL: 41.1 % (ref 20–50)
HGB BLD-MCNC: 11.9 G/DL (ref 12–16)
IMM GRANULOCYTES # BLD AUTO: 0.02 K/UL (ref 0–0.04)
IMM GRANULOCYTES NFR BLD AUTO: 0.3 % (ref 0–0.5)
LDLC SERPL CALC-MCNC: 96.6 MG/DL (ref 63–159)
LYMPHOCYTES # BLD AUTO: 2 K/UL (ref 1–4.8)
LYMPHOCYTES NFR BLD: 33.8 % (ref 18–48)
MCH RBC QN AUTO: 30.8 PG (ref 27–31)
MCHC RBC AUTO-ENTMCNC: 32.7 G/DL (ref 32–36)
MCV RBC AUTO: 94 FL (ref 82–98)
MONOCYTES # BLD AUTO: 0.6 K/UL (ref 0.3–1)
MONOCYTES NFR BLD: 10.4 % (ref 4–15)
NEUTROPHILS # BLD AUTO: 3.2 K/UL (ref 1.8–7.7)
NEUTROPHILS NFR BLD: 53.5 % (ref 38–73)
NONHDLC SERPL-MCNC: 109 MG/DL
NRBC BLD-RTO: 0 /100 WBC
PLATELET # BLD AUTO: 333 K/UL (ref 150–450)
PMV BLD AUTO: 10 FL (ref 9.2–12.9)
POTASSIUM SERPL-SCNC: 4.3 MMOL/L (ref 3.5–5.1)
PROT SERPL-MCNC: 7.4 G/DL (ref 6–8.4)
RBC # BLD AUTO: 3.86 M/UL (ref 4–5.4)
SODIUM SERPL-SCNC: 140 MMOL/L (ref 136–145)
TRIGL SERPL-MCNC: 62 MG/DL (ref 30–150)
TSH SERPL DL<=0.005 MIU/L-ACNC: 1.19 UIU/ML (ref 0.4–4)
WBC # BLD AUTO: 5.94 K/UL (ref 3.9–12.7)

## 2022-04-28 PROCEDURE — 83036 HEMOGLOBIN GLYCOSYLATED A1C: CPT | Performed by: STUDENT IN AN ORGANIZED HEALTH CARE EDUCATION/TRAINING PROGRAM

## 2022-04-28 PROCEDURE — 80053 COMPREHEN METABOLIC PANEL: CPT | Performed by: STUDENT IN AN ORGANIZED HEALTH CARE EDUCATION/TRAINING PROGRAM

## 2022-04-28 PROCEDURE — 80061 LIPID PANEL: CPT | Performed by: STUDENT IN AN ORGANIZED HEALTH CARE EDUCATION/TRAINING PROGRAM

## 2022-04-28 PROCEDURE — 36415 COLL VENOUS BLD VENIPUNCTURE: CPT | Performed by: STUDENT IN AN ORGANIZED HEALTH CARE EDUCATION/TRAINING PROGRAM

## 2022-04-28 PROCEDURE — 99214 OFFICE O/P EST MOD 30 MIN: CPT | Performed by: STUDENT IN AN ORGANIZED HEALTH CARE EDUCATION/TRAINING PROGRAM

## 2022-04-28 PROCEDURE — 84443 ASSAY THYROID STIM HORMONE: CPT | Performed by: STUDENT IN AN ORGANIZED HEALTH CARE EDUCATION/TRAINING PROGRAM

## 2022-04-28 PROCEDURE — 85025 COMPLETE CBC W/AUTO DIFF WBC: CPT | Performed by: STUDENT IN AN ORGANIZED HEALTH CARE EDUCATION/TRAINING PROGRAM

## 2022-04-28 RX ORDER — NAPROXEN 500 MG/1
500 TABLET ORAL 2 TIMES DAILY
Qty: 20 TABLET | Refills: 0 | Status: SHIPPED | OUTPATIENT
Start: 2022-04-28 | End: 2022-05-08

## 2022-04-28 NOTE — PROGRESS NOTES
Subjective:       Patient ID: Tato Godinez is a 41 y.o. female.    Chief Complaint: Back Pain (Since 2020), Neck Pain (Since 2020), and Shoulder Pain (Since 2020)    42 yo F w/ no PMH, presenting for annual exam. Endorsing weight gain over the last year. Reports previously was physical activity 2 days per week, while helping her kids with their after school sports. Walks for physical activity. However over the last year, has stopped being physically active and has noticed weight gain. Since last clinic encounter, weight gain of 11 pounds, which she feels has concentrated around her upper chest. She reports many years of upper and mid back pain and aches. When she was lighter, she had thought her breass were still too large and when she would lay supine, it caused pressure on her chest and has always had discomfort from its size. Since some weight gain, she reports her bra now leaves indentation in her shoulder and upper chest and back.     Reports poor diet compliance. Kitchen was being remodeled, and for the last 1.5 years of kitchen remodeling, was eating a lot of fast food.     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 Reports average amount of stress. Endorsing bilateral upper back pain at the end of the day. Endorses soreness in neck and shoulder throughout the day, however worse at the end of the day. Does use the computer a lot at work and does notice tensing of neck and shoulders during work. Takes ibuprofen and tylenol PRN, and applies heating pad and icy hot helps with pains and aches in neck and shoulder.    Denies F/C, N/V, HA, lightheadedness, dizziness, acute changes in vision, congestion, rhinorrhea, cough, sore throat, difficulty breathing, CP, SOB, abd pain, bowel/bladder issues, difficulty ambulating, rashes or wounds.     Review of Systems   Constitutional: Positive for unexpected weight change (progressive weight gain over >1 year due to lack of physical activity and poor diet). Negative  for chills and fever.   HENT: Negative for congestion, rhinorrhea and sore throat.    Eyes: Negative for visual disturbance.   Respiratory: Negative for cough and shortness of breath.    Cardiovascular: Negative for chest pain.   Gastrointestinal: Negative for abdominal pain, nausea and vomiting.   Genitourinary: Negative for difficulty urinating and dysuria.   Musculoskeletal: Positive for back pain (upper and mid back pain and soreness, worse at end of day) and neck pain. Negative for gait problem.   Neurological: Negative for weakness and headaches.       Objective:      Vitals:    04/28/22 1413   BP: 105/68   Pulse: 72     Physical Exam  Vitals and nursing note reviewed.   Constitutional:       General: She is not in acute distress.     Appearance: Normal appearance. She is not ill-appearing.   Eyes:      General:         Right eye: No discharge.         Left eye: No discharge.      Conjunctiva/sclera: Conjunctivae normal.   Cardiovascular:      Rate and Rhythm: Normal rate and regular rhythm.      Heart sounds: Normal heart sounds. No murmur heard.  Pulmonary:      Effort: Pulmonary effort is normal. No respiratory distress.      Breath sounds: Normal breath sounds. No wheezing.   Abdominal:      General: Bowel sounds are normal.      Palpations: Abdomen is soft.      Tenderness: There is no abdominal tenderness.   Musculoskeletal:      Comments: Negative midline spinal tenderness on palpation of upper and mid thoracic back.    R shoulder pain with empty can test, neer's test, and Hawkin's Soto testing   Neurological:      Mental Status: She is alert and oriented to person, place, and time.   Psychiatric:         Behavior: Behavior normal.         Assessment:       1. Shoulder impingement, right    2. Chronic bilateral thoracic back pain    3. Class 1 obesity with body mass index (BMI) of 34.0 to 34.9 in adult, unspecified obesity type, unspecified whether serious comorbidity present        Plan:        Shoulder impingement, right  -     naproxen (NAPROSYN) 500 MG tablet; Take 1 tablet (500 mg total) by mouth 2 (two) times daily. for 10 days  Dispense: 20 tablet; Refill: 0    Chronic bilateral thoracic back pain  -     Ambulatory referral/consult to Breast Surgery; Future; Expected date: 05/05/2022  -     Ambulatory referral/consult to Physical/Occupational Therapy; Future; Expected date: 05/05/2022    Class 1 obesity with body mass index (BMI) of 34.0 to 34.9 in adult, unspecified obesity type, unspecified whether serious comorbidity present  -     CBC Auto Differential; Future; Expected date: 04/28/2022  -     Comprehensive Metabolic Panel; Future; Expected date: 04/28/2022  -     Hemoglobin A1C; Future; Expected date: 04/28/2022  -     Lipid Panel; Future; Expected date: 04/28/2022  -     TSH; Future; Expected date: 04/28/2022    Pt's cup size has changed over the years. Current bra size is 38G, and has increased over the last several years, whenever she needs new bras. This current size is too small. Has experienced upper back pain which she relates to large breasts over last 9 years, started worsening after having her twins.     04/30: Called pt and reviewed labs with patient. Advised f/u if symptoms worsen, otherwise will trial PT and refer to breast surgery for eval.     Follow up if symptoms worsen or fail to improve.

## 2022-05-04 ENCOUNTER — CLINICAL SUPPORT (OUTPATIENT)
Dept: REHABILITATION | Facility: HOSPITAL | Age: 42
End: 2022-05-04
Payer: MEDICAID

## 2022-05-04 DIAGNOSIS — M54.6 CHRONIC BILATERAL THORACIC BACK PAIN: ICD-10-CM

## 2022-05-04 DIAGNOSIS — G89.29 CHRONIC RIGHT SHOULDER PAIN: ICD-10-CM

## 2022-05-04 DIAGNOSIS — G89.29 CHRONIC BILATERAL THORACIC BACK PAIN: ICD-10-CM

## 2022-05-04 DIAGNOSIS — M25.511 CHRONIC RIGHT SHOULDER PAIN: ICD-10-CM

## 2022-05-04 PROCEDURE — 97161 PT EVAL LOW COMPLEX 20 MIN: CPT

## 2022-05-04 NOTE — PLAN OF CARE
[facility]   Physical Therapy Initial Evaluation     Date: 5/4/2022   Name: Tato Godinez  Clinic Number: [id number]    Therapy Diagnosis:   Encounter Diagnoses   Name Primary?    Chronic bilateral thoracic back pain     Chronic right shoulder pain      Physician: Davis Lorenzo MD    Physician Orders: PT Eval and Treat   Medical Diagnosis from Referral:    M54.6,G89.29 (ICD-10-CM) - Chronic bilateral thoracic back pain      Evaluation Date: 5/4/2022  Authorization Period Expiration: 04/28/2023  Plan of Care Expiration: 06/15/2022  Progress Note Due: 10th visit   Visit # / Visits authorized: 1 / pending auth   FOTO: 52%/42%    Precautions: Standard      Time In: 0800 am  Time Out: 0900 am  Total Appointment Time (timed & untimed codes): 60 minutes      SUBJECTIVE     Date of onset: right shoulder pain; 4 months ago - Back pain; off and on for 7 years     History of current condition - Richelzbieta reports: Pt states she is not as active as she used to be and has also gained weight in the last year which she thinks is contributing to her recent right shoulder pain. Pt has been dealing with mid-lower back pain for a while now; pt has consult to get a breast reduction soon (currently size 38 G), just waiting for a call. Concerning her right shoulder pt states she has been throwing the ball with her children more which has increased her pain, along with sleeping on that shoulder at night. Pt states she feels her back pain all the time, is triggered by most daily activities. Pt limited the most with showering (reaching across her body to wash her back). Pt has no issue with overhead motion.     Falls: none to note     Imaging, none,     Prior Therapy: none to note   Social History: lives with 11 year old twins (two girls)   Occupation: School nurse; mostly at the computer   Prior Level of Function: Independent   Current Level of Function: Limited with showering; cleaning the house (mop/sweep)  "    Pain:  Current 2/10, worst 7/10, best 2/10   Location: right shoulder;  thoracic spine  Description: Aching, Tight and Deep - feels more like tension   Aggravating Factors: Sitting, Standing, Laying, Bending, Morning Time and Lifting  Easing Factors: massage, heating pad and rest    Patients goals: "get a good idea of what I need to work on so that the pain does not return"      Medical History:   No past medical history on file.    Surgical History:   Tato Godinez  has a past surgical history that includes Hand surgery and  section.    Medications:   Tato KITCHEN has a current medication list which includes the following prescription(s): albuterol and naproxen.    Allergies:   Review of patient's allergies indicates:  No Known Allergies       OBJECTIVE   Observation: rounded shoulders; visible flattening of thoracic spine; forward head posture     Passive Range of Motion:   Shoulder Right   Flexion WNL   Abduction WNL   ER at 0 WNL   ER at 90 WNL   IR WNL       Active Range of Motion:   Shoulder Right Left   Flexion WNL WNL   Abduction 132 degrees - pain felt in posterior shoulder  WNL   ER at 0 WNL WNL   ER at 90 WNL WNL   IR (behind back) WNL WNL     Strength: Upper trapezius contracted with all movements   Shoulder Right Left   Flexion 3+/5 4/5   Abduction 3+/5 4/5   ER 3/5 4/5   IR 3/5 - pain with resistance  4/5   *Weak latissimus; serratus anterior: 3-/5     Special Tests:   Right   Dolan- Soto (+)   Neer's (+)   Full Can (+)      Right   Load & Shift (-)   Sulcus Sign (-)   Apprehension Test (-)   Relocation Test  (-)      Right   Drop Arm Test (-)   ER Lag Sign (-)   Bear Hug (-)   Belly Press  (-)     Joint Mobility: Normal mobility in right shoulders; hypomobile from T6-L5 - pt with increased pain with PA mobilization to T12 (radiating pain into upper back)     Palpation: active trigger points in upper trap and rotator cuff insertion    Sensation: Intact bilaterally     Flexibility: B " hamstring tightness, limited lumbar extension and B rotation (right > left); kneeling thoracic rotation limited + pain (right > left)    Limitation/Restriction for FOTO Survey    Therapist reviewed FOTO scores for Tato Godinez on 5/4/2022.   FOTO documents entered into EP[employer] - see Media section.    Limitation Score: 52%         TREATMENT     Total Treatment time (time-based codes) separate from Evaluation: 00 minutes      Tato received the treatments listed below:      therapeutic exercises to develop strength, endurance, ROM and flexibility for 00 minutes including:  Rhomboid standing  Prayer stretch    Upper trap stretch   Supine serratus punch   Supine abduction   Prone row   Prone abduction   SKTC  Piriformis stretch     manual therapy techniques: Joint mobilizations, Myofacial release and Soft tissue Mobilization were applied to the: right shoulder/thoracic spine for 00 minutes, including:  -functional dry needling next visit (rotator cuff, upper trapezius)   -posterior anterior springing to thoracic and lumbar spine     hot pack for 00 minutes to right shoulder post functional dry needling to improve blood flow and decrease muscle pain.      PATIENT EDUCATION AND HOME EXERCISES     Education provided:   - Will be given next visit     Written Home Exercises Provided: will be given next visit . Exercises were reviewed and Tato was able to demonstrate them prior to the end of the session.  Tato demonstrated good  understanding of the education provided. See EMR under Patient Instructions for exercises provided during therapy sessions.    ASSESSMENT     Tato KITCHEN is a 41 y.o. female referred to outpatient Physical Therapy with a medical diagnosis of M54.6,G89.29 (ICD-10-CM) - Chronic bilateral thoracic back pain. Patient presents with Patient presents to PT with pain, decreased cervical/lumbar ROM, right shoulder ROM, decreased strength, poor posture, impaired balance and gait, and functional deficits  with recreational activities. Pt will benefit from skilled PT to decrease overall pain and return patient to PLOF.     Patient prognosis is Good.   Patientt will benefit from skilled outpatient Physical Therapy to address the deficits stated above and in the chart below, provide patient /family education, and to maximize patientt's level of independence.     Plan of care discussed with patient: Yes  Patient's spiritual, cultural and educational needs considered and patient is agreeable to the plan of care and goals as stated below:     Anticipated Barriers for therapy: none to note     Medical Necessity is demonstrated by the following  History  Co-morbidities and personal factors that may impact the plan of care Co-morbidities:   Acute labyrinthitis    Personal Factors:   no deficits     low   Examination  Body Structures and Functions, activity limitations and participation restrictions that may impact the plan of care Body Regions:   upper extremities  trunk    Body Systems:    gross symmetry  strength  transfers  motor control  motor learning    Participation Restrictions:   None to note     Activity limitations:   Learning and applying knowledge  no deficits    General Tasks and Commands  no deficits    Communication  no deficits    Mobility  lifting and carrying objects  driving (bike, car, motorcycle)    Self care  washing oneself (bathing, drying, washing hands)    Domestic Life  doing house work (cleaning house, washing dishes, laundry)    Interactions/Relationships  no deficits    Life Areas  no deficits    Community and Social Life  no deficits         low   Clinical Presentation stable and uncomplicated low   Decision Making/ Complexity Score: low     GOALS:   Short Term Goals:  3 weeks  1.Report decreased thoracic pain < / =  5/10  to increase tolerance for work activities   2. Increase lumbar range of motion by 10% to increase tolerance for recreational activities   3. Increased strength by 1/3 MMT  grade in postural musculature to increase tolerance for ADL and work activities.  4. Pt to tolerate HEP to improve ROM and independence with ADL's    Long Term Goals: 6 weeks  1.Report decreased lumbar pain  < / =  3 /10  to increase tolerance for work activities   2. Increase lumbar range of motion by 20% to increase tolerance for recreational activities   3.Increase strength to >/= 4/5 in right shoulder to increase tolerance for ADL and work activities.  4. Pt goal: Pt able to sit at her desk for work for > 2 hrs with >/= 2/10 thoracic pain   5. Pt to achieve <40% limitation as measured by the FOTO to demonstrate decreased disability.    PLAN   Plan of care Certification: 5/4/2022 to 06/15/2022.    Outpatient Physical Therapy 2 times weekly for 6 weeks to include the following interventions: Manual Therapy, Moist Heat/ Ice, Neuromuscular Re-ed, Patient Education and Therapeutic Exercise.     Quynh Baugh, PT, DPT       I CERTIFY THE NEED FOR THESE SERVICES FURNISHED UNDER THIS PLAN OF TREATMENT AND WHILE UNDER MY CARE   Physician's comments:     Physician's Signature: ___________________________________________________

## 2022-05-10 ENCOUNTER — CLINICAL SUPPORT (OUTPATIENT)
Dept: REHABILITATION | Facility: HOSPITAL | Age: 42
End: 2022-05-10
Payer: MEDICAID

## 2022-05-10 DIAGNOSIS — M25.511 ACUTE PAIN OF RIGHT SHOULDER: Primary | ICD-10-CM

## 2022-05-10 PROCEDURE — 97110 THERAPEUTIC EXERCISES: CPT

## 2022-05-10 NOTE — PROGRESS NOTES
[facility]   Physical Therapy Treatment Note     Name: Tato Godinez  Clinic Number: [id number]    Therapy Diagnosis:   Encounter Diagnosis   Name Primary?    Acute pain of right shoulder Yes     Physician: Davis Lorenzo MD    Visit Date: 5/10/2022      Physician Orders: PT Eval and Treat   Medical Diagnosis from Referral:     M54.6,G89.29 (ICD-10-CM) - Chronic bilateral thoracic back pain      Evaluation Date: 5/4/2022  Authorization Period Expiration: 04/28/2023  Plan of Care Expiration: 06/15/2022  Progress Note Due: 10th visit   Visit # / Visits authorized: 2/12  FOTO: 52%/42%     Precautions: Standard       Time In: 0700 am  Time Out: 0800 am  Total Appointment Time (timed & untimed codes): 30 minutes (2 TE) - medicaid       SUBJECTIVE     Pt reports: Pt states she did a lot of cleaning last night, says her lower back was hurting the most (8/10). Pt states her shoulder feel a little stiff but not as much pain as the lower back.   She was compliant with home exercise program.  Response to previous treatment: None to note   Functional change: None to note     Pain: 8/10  Location: right shoulder; lower back     OBJECTIVE     Objective Measures updated at progress report unless specified.     Treatment         Tato received the treatments listed below:       therapeutic exercises to develop strength, endurance, ROM and flexibility for 30 minutes including:  Rhomboid standing; 10 sec x 10 times (right arm only)   Prayer stretch; 10 sec x 15 times   Upper trap stretch; 30 sec x 3 times bilaterally   Levator Stretch; 30 sec x 3 times bilaterally   Supine Hamstring stretch; 10 sec x 10 times bilaterally       Next Visit:   Supine serratus punch   Supine abduction   Prone row   Prone abduction   SKTC  Piriformis stretch      manual therapy techniques: Joint mobilizations, Myofacial release and Soft tissue Mobilization were applied to the: right shoulder/thoracic spine for 15 minutes,  including:  -functional dry needling next visit (rotator cuff, upper trapezius)   -posterior anterior springing to thoracic and lumbar spine     See EMR under MEDIA for written consent provided 5/10/2022.     Palpation Assessment to determine the necessity for Functional Dry Needling; bilateral upper trap, right rotator cuff insertion, posterior deltoid, bilateral levator scapulae   -Will assess lower back next visit for functional dry needling      hot pack for 10 minutes to right shoulder post functional dry needling to improve blood flow and decrease muscle pain.      Patient Education and Home Exercises     Home Exercises Provided and Patient Education Provided     Education provided:   What To Expect After Functional Dry Needling ® Treatments           How will I feel after a session of FDN?     You may feel some soreness immediately after treatment in the area of the body you were treated. This does not always occur but should be expected and is considered normal. It can also take up to a few hours, or even until the next day, to feel an onset of soreness. The soreness may vary from person to person and based on the area of the body that was treated, but it typically feels like you had an intense workout at the gym. Soreness typically lasts 24-48 hours. Make sure to indicate to your provider at a follow-up appointment how long the soreness lasted.   Bruising from the treatment is possible, somehow uncommon, but it is not of concern. Some areas are more likely to bruise than others, including the shoulders, chest, face, and portions of the extremities. Large bruising rarely occurs, but is possible. Use ice to help decrease the bruising and if you feel concern, please call your provider.    It is common to feel tired/fatigued, energized, emotional, giggly, or out of it after treatment. This is a normal response that can last up to an hour or two after treatment. If this lasts beyond a day, contact your  provider as a precaution.   There are times when treatment may actually exacerbate your symptoms. This is normal and may indicate that you need to follow up sooner with your practitioner to continue treatment. If this continues past the 24-48 hour window, keep note of it, as this can help your provider adjust your treatment plan if needed based on your report. This does not mean that FDN cannot help your condition.    What should I do after my treatment and what is recommended?    We highly recommend increasing your water intake for the next 24 hours after treatment to help avoid or reduce soreness. We also recommend soaking in a hot bath or hot tub to help relieve post treatment soreness and to soften the symptoms associated with the treatment you received. After dry needling treatment, you may do the following based on your comfort level. Please note that if it hurts or exacerbates your symptoms, then discontinuing the activity is best.     Work out and/or stretch.   Participate in normal physical activity.   Massage the area.   Use heat or ice as preferred for post treatment soreness.   Stay hydrated.    If you have prescription medicines, continue to take them as prescribed.    What should I avoid after treatment?     Unfamiliar physical activities or sports.   Doing more than you normally do.   Excessive alcohol intake.     If you are feeling light headed, or experience difficulty breathing, chest pain, or any other concerning symptoms after treatment, call us immediately. If you are unable to get a hold of us, please call your physician.       Written Home Exercises Provided: yes. Exercises were reviewed and Tato was able to demonstrate them prior to the end of the session.  Richelzbieta demonstrated good  understanding of the education provided. See EMR under Patient Instructions for exercises provided during therapy sessions    ASSESSMENT   Tato KITCHEN is a 41 y.o. female referred to outpatient  Physical Therapy with a medical diagnosis of M54.6,G89.29 (ICD-10-CM) - Chronic bilateral thoracic back pain. Patient presents with Patient presents to PT with pain, decreased cervical/lumbar ROM, right shoulder ROM, decreased strength, poor posture, impaired balance and gait, and functional deficits with recreational activities. Pt will benefit from skilled PT to decrease overall pain and return patient to PLOF. Patient demonstrated appropriate response to Functional Dry Needling. good  rhythmical contractions observed without estim to treated muscle groups.    Tato Is progressing well towards her goals.   Pt prognosis is Excellent.     Pt will continue to benefit from skilled outpatient physical therapy to address the deficits listed in the problem list box on initial evaluation, provide pt/family education and to maximize pt's level of independence in the home and community environment.     Pt's spiritual, cultural and educational needs considered and pt agreeable to plan of care and goals.     Anticipated barriers to physical therapy: none to note     GOALS:   Short Term Goals:  3 weeks  1.Report decreased thoracic pain < / =  5/10  to increase tolerance for work activities   2. Increase lumbar range of motion by 10% to increase tolerance for recreational activities   3. Increased strength by 1/3 MMT grade in postural musculature to increase tolerance for ADL and work activities.  4. Pt to tolerate HEP to improve ROM and independence with ADL's     Long Term Goals: 6 weeks  1.Report decreased lumbar pain  < / =  3 /10  to increase tolerance for work activities   2. Increase lumbar range of motion by 20% to increase tolerance for recreational activities   3.Increase strength to >/= 4/5 in right shoulder to increase tolerance for ADL and work activities.  4. Pt goal: Pt able to sit at her desk for work for > 2 hrs with >/= 2/10 thoracic pain   5. Pt to achieve <40% limitation as measured by the FOTO to  demonstrate decreased disability.    PLAN   Focus on improving rotator cuff strength and tissue extensibility in lower extremities. Monitor response to Functional Dry Needling. Continue with Functional Dry Needling in POC as appropriate.       Quynh Baugh, PT, DPT

## 2022-05-10 NOTE — PROGRESS NOTES
[facility]   Physical Therapy Treatment Note     Name: Tato Godinez  Clinic Number: [id number]    Therapy Diagnosis:   Encounter Diagnosis   Name Primary?    Acute pain of right shoulder Yes     Physician: Davis Lorenzo MD    Visit Date: 5/11/2022      Physician Orders: PT Eval and Treat   Medical Diagnosis from Referral:     M54.6,G89.29 (ICD-10-CM) - Chronic bilateral thoracic back pain      Evaluation Date: 5/4/2022  Authorization Period Expiration: 04/28/2023  Plan of Care Expiration: 06/15/2022  Progress Note Due: 10th visit   Visit # / Visits authorized: 3/12  FOTO: 52%/42%     Precautions: Standard       Time In: 9:00 am   Time Out: 10:00 am  Total Appointment Time (timed & untimed codes): 60 minutes (4 TE) - medicaid       SUBJECTIVE     Pt reports: feeling good and has soreness more then anything in her shoulders from the dry needling. Pt reports mild low back pain which is also feeling better today .    She was compliant with home exercise program.  Response to previous treatment: decreased pain following dry needling   Functional change: None to note     Pain: 3/10  Location: right shoulder; lower back     OBJECTIVE     Objective Measures updated at progress report unless specified.     Treatment      Tato received the treatments listed below:       therapeutic exercises to develop strength, endurance, ROM and flexibility for 60 minutes including:    Rhomboid standing; 10 sec x 10 times (right arm only)   Upper trap stretch; 30 sec x 3 times bilaterally   Levator Stretch; 30 sec x 3 times bilaterally   Prayer stretch; 10 sec x 15 times   Supine Hamstring stretch; 10 sec x 10 times bilaterally   + Piriformis stretch : 3 x 30 '' B   + SKTC stretch : 5 x 10'''' B  + Supine serratus punch, 1# dowel : 2 x 10 reps   + Supine horizontal abduction RTB : 2 x 10 reps     Next Visit:   Prone row   Prone abduction      manual therapy techniques: Joint mobilizations, Myofacial release  and Soft tissue Mobilization were applied to the: right shoulder/thoracic spine for 0 minutes, including:  -functional dry needling next visit (rotator cuff, upper trapezius)   -posterior anterior springing to thoracic and lumbar spine     See EMR under MEDIA for written consent provided 5/10/2022.     Palpation Assessment to determine the necessity for Functional Dry Needling; bilateral upper trap, right rotator cuff insertion, posterior deltoid, bilateral levator scapulae   -Will assess lower back next visit for functional dry needling      hot pack for 10 minutes to right shoulder post functional dry needling to improve blood flow and decrease muscle pain.      Patient Education and Home Exercises     Home Exercises Provided and Patient Education Provided     Education provided:   What To Expect After Functional Dry Needling ® Treatments           How will I feel after a session of FDN?     You may feel some soreness immediately after treatment in the area of the body you were treated. This does not always occur but should be expected and is considered normal. It can also take up to a few hours, or even until the next day, to feel an onset of soreness. The soreness may vary from person to person and based on the area of the body that was treated, but it typically feels like you had an intense workout at the gym. Soreness typically lasts 24-48 hours. Make sure to indicate to your provider at a follow-up appointment how long the soreness lasted.   Bruising from the treatment is possible, somehow uncommon, but it is not of concern. Some areas are more likely to bruise than others, including the shoulders, chest, face, and portions of the extremities. Large bruising rarely occurs, but is possible. Use ice to help decrease the bruising and if you feel concern, please call your provider.    It is common to feel tired/fatigued, energized, emotional, giggly, or out of it after treatment. This is a normal response  that can last up to an hour or two after treatment. If this lasts beyond a day, contact your provider as a precaution.   There are times when treatment may actually exacerbate your symptoms. This is normal and may indicate that you need to follow up sooner with your practitioner to continue treatment. If this continues past the 24-48 hour window, keep note of it, as this can help your provider adjust your treatment plan if needed based on your report. This does not mean that FDN cannot help your condition.    What should I do after my treatment and what is recommended?    We highly recommend increasing your water intake for the next 24 hours after treatment to help avoid or reduce soreness. We also recommend soaking in a hot bath or hot tub to help relieve post treatment soreness and to soften the symptoms associated with the treatment you received. After dry needling treatment, you may do the following based on your comfort level. Please note that if it hurts or exacerbates your symptoms, then discontinuing the activity is best.     Work out and/or stretch.   Participate in normal physical activity.   Massage the area.   Use heat or ice as preferred for post treatment soreness.   Stay hydrated.    If you have prescription medicines, continue to take them as prescribed.    What should I avoid after treatment?     Unfamiliar physical activities or sports.   Doing more than you normally do.   Excessive alcohol intake.     If you are feeling light headed, or experience difficulty breathing, chest pain, or any other concerning symptoms after treatment, call us immediately. If you are unable to get a hold of us, please call your physician.       Written Home Exercises Provided: yes. Exercises were reviewed and Tato was able to demonstrate them prior to the end of the session.  Tato demonstrated good  understanding of the education provided. See EMR under Patient Instructions for exercises provided during  therapy sessions    ASSESSMENT   Pt presents with improved pain and good response to dry needling at last session. Session focused on UE and LE stretching as well as addition of UE postural strengthening which she tolerated well and completed with no symptom exacerbation.       Tato Is progressing well towards her goals.   Pt prognosis is Excellent.     Pt will continue to benefit from skilled outpatient physical therapy to address the deficits listed in the problem list box on initial evaluation, provide pt/family education and to maximize pt's level of independence in the home and community environment.   Pt's spiritual, cultural and educational needs considered and pt agreeable to plan of care and goals.     Anticipated barriers to physical therapy: none to note     GOALS:   Short Term Goals:  3 weeks  1.Report decreased thoracic pain < / =  5/10  to increase tolerance for work activities   2. Increase lumbar range of motion by 10% to increase tolerance for recreational activities   3. Increased strength by 1/3 MMT grade in postural musculature to increase tolerance for ADL and work activities.  4. Pt to tolerate HEP to improve ROM and independence with ADL's     Long Term Goals: 6 weeks  1.Report decreased lumbar pain  < / =  3 /10  to increase tolerance for work activities   2. Increase lumbar range of motion by 20% to increase tolerance for recreational activities   3.Increase strength to >/= 4/5 in right shoulder to increase tolerance for ADL and work activities.  4. Pt goal: Pt able to sit at her desk for work for > 2 hrs with >/= 2/10 thoracic pain   5. Pt to achieve <40% limitation as measured by the FOTO to demonstrate decreased disability.    PLAN   Focus on improving rotator cuff strength and tissue extensibility in lower extremities. Monitor response to Functional Dry Needling. Continue with Functional Dry Needling in POC as appropriate.       Afshan Upton, PTA

## 2022-05-11 ENCOUNTER — CLINICAL SUPPORT (OUTPATIENT)
Dept: REHABILITATION | Facility: HOSPITAL | Age: 42
End: 2022-05-11
Payer: MEDICAID

## 2022-05-11 DIAGNOSIS — M25.511 ACUTE PAIN OF RIGHT SHOULDER: Primary | ICD-10-CM

## 2022-05-11 PROCEDURE — 97110 THERAPEUTIC EXERCISES: CPT | Mod: CQ

## 2022-05-17 ENCOUNTER — CLINICAL SUPPORT (OUTPATIENT)
Dept: REHABILITATION | Facility: HOSPITAL | Age: 42
End: 2022-05-17
Payer: MEDICAID

## 2022-05-17 DIAGNOSIS — M25.511 ACUTE PAIN OF RIGHT SHOULDER: Primary | ICD-10-CM

## 2022-05-17 PROCEDURE — 97110 THERAPEUTIC EXERCISES: CPT

## 2022-05-17 NOTE — PROGRESS NOTES
"[facility]   Physical Therapy Treatment Note     Name: Tato Godinez  Clinic Number: [id number]    Therapy Diagnosis:   Encounter Diagnosis   Name Primary?    Acute pain of right shoulder Yes     Physician: Davis Lorenzo MD    Visit Date: 5/17/2022      Physician Orders: PT Eval and Treat   Medical Diagnosis from Referral:     M54.6,G89.29 (ICD-10-CM) - Chronic bilateral thoracic back pain      Evaluation Date: 5/4/2022  Authorization Period Expiration: 04/28/2023  Plan of Care Expiration: 06/15/2022  Progress Note Due: 10th visit   Visit # / Visits authorized: 4/12  FOTO: 52%/42%     PTA Visit: 0/5     Precautions: Standard       Time In: 0815 am (pt late to PT)   Time Out: 0900 am  Total Appointment Time (timed & untimed codes): 45 minutes (3 TE) - medicaid       SUBJECTIVE     Pt reports: Pt states she had a teeth pulled last night, took her pain medication late last night which caused to oversleep this morning. Pt states she is experiencing pain in her lower back and both shoulders today. States the stretching has been helping with her pain.   She was compliant with home exercise program.  Response to previous treatment: decreased pain following dry needling   Functional change: None to note     Pain: 6/10 - both shoulder and lower back bothering her today   Location: right shoulder; lower back     OBJECTIVE     Objective Measures updated at progress report unless specified.     Treatment      Tato received the treatments listed below:       therapeutic exercises to develop strength, endurance, ROM and flexibility for 45 minutes including:    Rhomboid standing; 10 sec x 5 times bilaterally  Prayer stretch; 10 sec x 10 times   Supine Hamstring stretch; 10 sec x 10 times bilaterally   Piriformis stretch : 3 times x 30 '' bilaterally   SKTC stretch : 30" x 3 times bilaterally  Posterior pelvic tilt; 5 sec hold x 30 times   Posterior pelvic tilt + glute squeeze; 5 sec hold x 30 times "     Next Visit:   Prone row   Prone abduction   Supine serratus punch, 1# dowel : 2 x 10 reps   Supine horizontal abduction RTB : 2 x 10 reps     manual therapy techniques: Joint mobilizations, Myofacial release and Soft tissue Mobilization were applied to the: right shoulder/thoracic spine for 00 minutes, including:  -functional dry needling next visit (rotator cuff, upper trapezius)   -posterior anterior springing to thoracic and lumbar spine     See EMR under MEDIA for written consent provided 5/10/2022.     Palpation Assessment to determine the necessity for Functional Dry Needling; bilateral upper trap, right rotator cuff insertion, posterior deltoid, bilateral levator scapulae   -Will assess lower back next visit for functional dry needling      hot pack for 10 minutes to right shoulder post functional dry needling to improve blood flow and decrease muscle pain.      Patient Education and Home Exercises     Home Exercises Provided and Patient Education Provided   - Continue HEP     Education provided:   Written Home Exercises Provided: yes. Exercises were reviewed and Tato was able to demonstrate them prior to the end of the session.  Tato demonstrated good  understanding of the education provided. See EMR under Patient Instructions for exercises provided during therapy sessions    ASSESSMENT   Due to limited time, session today focused on lower extremity and upper extremity stretching to decrease patient current symptoms. Pt will continue to benefit from addition of UE postural strengthening along with hook-lying core series which she tolerated well and completed with no symptom exacerbation.     Tato Is progressing well towards her goals.   Pt prognosis is Excellent.     Pt will continue to benefit from skilled outpatient physical therapy to address the deficits listed in the problem list box on initial evaluation, provide pt/family education and to maximize pt's level of independence in the home  and community environment.   Pt's spiritual, cultural and educational needs considered and pt agreeable to plan of care and goals.     Anticipated barriers to physical therapy: none to note     GOALS:   Short Term Goals:  3 weeks  1.Report decreased thoracic pain < / =  5/10  to increase tolerance for work activities   2. Increase lumbar range of motion by 10% to increase tolerance for recreational activities   3. Increased strength by 1/3 MMT grade in postural musculature to increase tolerance for ADL and work activities.  4. Pt to tolerate HEP to improve ROM and independence with ADL's     Long Term Goals: 6 weeks  1.Report decreased lumbar pain  < / =  3 /10  to increase tolerance for work activities   2. Increase lumbar range of motion by 20% to increase tolerance for recreational activities   3.Increase strength to >/= 4/5 in right shoulder to increase tolerance for ADL and work activities.  4. Pt goal: Pt able to sit at her desk for work for > 2 hrs with >/= 2/10 thoracic pain   5. Pt to achieve <40% limitation as measured by the FOTO to demonstrate decreased disability.    PLAN   Focus on improving rotator cuff strength and tissue extensibility in lower extremities.     Quynh Baugh, PT, DPT

## 2022-05-18 ENCOUNTER — CLINICAL SUPPORT (OUTPATIENT)
Dept: REHABILITATION | Facility: HOSPITAL | Age: 42
End: 2022-05-18
Payer: MEDICAID

## 2022-05-18 DIAGNOSIS — M25.511 ACUTE PAIN OF RIGHT SHOULDER: Primary | ICD-10-CM

## 2022-05-18 PROCEDURE — 97110 THERAPEUTIC EXERCISES: CPT | Mod: CQ

## 2022-05-18 NOTE — PROGRESS NOTES
"[facility]   Physical Therapy Treatment Note     Name: Tato Godinez  Clinic Number: [id number]    Therapy Diagnosis:   Encounter Diagnosis   Name Primary?    Acute pain of right shoulder Yes     Physician: Davis Lorenzo MD    Visit Date: 5/18/2022      Physician Orders: PT Eval and Treat   Medical Diagnosis from Referral:     M54.6,G89.29 (ICD-10-CM) - Chronic bilateral thoracic back pain      Evaluation Date: 5/4/2022  Authorization Period Expiration: 04/28/2023  Plan of Care Expiration: 06/15/2022  Progress Note Due: 10th visit   Visit # / Visits authorized: 5/12  FOTO: 52%/42%     PTA Visit: 1/5     Precautions: Standard       Time In: 8:00 am   Time Out: 9:10 am   Total Appointment Time: 70 minutes   Total Billable Time: 60 minutes     SUBJECTIVE     Pt reports: feeling mainly stiff in her neck and shoulder on the right side which was uncomfortable last night.   She was compliant with home exercise program.  Response to previous treatment: felt okay after   Functional change: None to note     Pain:       6/10                     ;      3/10  Location: right shoulder and neck ;    Low back     OBJECTIVE     Objective Measures updated at progress report unless specified.     Treatment      Tato received the treatments listed below:       therapeutic exercises to develop strength, endurance, ROM and flexibility for 60 minutes including:    Rhomboid standing; 10 sec x 5 times bilaterally  Prayer stretch; 10 sec x 10 times   Supine Hamstring stretch; 10 sec x 10 times bilaterally   Piriformis stretch : 3 times x 30 '' bilaterally   SKTC stretch : 30" x 3 times bilaterally    Posterior pelvic tilt; 5 sec hold x 30 times   Posterior pelvic tilt + glute squeeze; 5 sec hold x 20 times   Posterior pelvic tilt + hip adduction isometric c/ ball : x20 reps , 5'' hold   Seated scapular retractions : x15 reps , 5'' hold   Prone row c/ scap retract : x 10 reps B  Prone abduction c/ scap retract : " x 10 reps B  Supine serratus punch, 1# dowel : 2 x 10 reps     Next Visit:   Supine horizontal abduction RTB : 2 x 10 reps     manual therapy techniques: Joint mobilizations, Myofacial release and Soft tissue Mobilization were applied to the: right shoulder/thoracic spine for 00 minutes, including:  -functional dry needling next visit (rotator cuff, upper trapezius)   -posterior anterior springing to thoracic and lumbar spine     See EMR under MEDIA for written consent provided 5/10/2022.     Palpation Assessment to determine the necessity for Functional Dry Needling; bilateral upper trap, right rotator cuff insertion, posterior deltoid, bilateral levator scapulae   -Will assess lower back next visit for functional dry needling      hot pack for 10 minutes to right shoulder post session to improve blood flow and decrease muscle pain.    Patient Education and Home Exercises     Home Exercises Provided and Patient Education Provided   - Continue HEP     Education provided:   Written Home Exercises Provided: yes. Exercises were reviewed and Tato was able to demonstrate them prior to the end of the session.  Tato demonstrated good  understanding of the education provided. See EMR under Patient Instructions for exercises provided during therapy sessions    ASSESSMENT     Pt continues to report relief with stretches performed . She responded well to addition of hooklying core brace series and UE postural strengthening . Pt with no increased pain reported post session and appropriate muscle response noted.     Tato Is progressing well towards her goals.   Pt prognosis is Excellent.     Pt will continue to benefit from skilled outpatient physical therapy to address the deficits listed in the problem list box on initial evaluation, provide pt/family education and to maximize pt's level of independence in the home and community environment.   Pt's spiritual, cultural and educational needs considered and pt agreeable  to plan of care and goals.     Anticipated barriers to physical therapy: none to note     GOALS:   Short Term Goals:  3 weeks  1.Report decreased thoracic pain < / =  5/10  to increase tolerance for work activities   2. Increase lumbar range of motion by 10% to increase tolerance for recreational activities   3. Increased strength by 1/3 MMT grade in postural musculature to increase tolerance for ADL and work activities.  4. Pt to tolerate HEP to improve ROM and independence with ADL's     Long Term Goals: 6 weeks  1.Report decreased lumbar pain  < / =  3 /10  to increase tolerance for work activities   2. Increase lumbar range of motion by 20% to increase tolerance for recreational activities   3.Increase strength to >/= 4/5 in right shoulder to increase tolerance for ADL and work activities.  4. Pt goal: Pt able to sit at her desk for work for > 2 hrs with >/= 2/10 thoracic pain   5. Pt to achieve <40% limitation as measured by the FOTO to demonstrate decreased disability.    PLAN   Focus on improving rotator cuff strength and tissue extensibility in lower extremities.     Afshan Upton, PTA

## 2022-05-24 ENCOUNTER — CLINICAL SUPPORT (OUTPATIENT)
Dept: REHABILITATION | Facility: HOSPITAL | Age: 42
End: 2022-05-24
Payer: MEDICAID

## 2022-05-24 DIAGNOSIS — M25.511 ACUTE PAIN OF RIGHT SHOULDER: Primary | ICD-10-CM

## 2022-05-24 PROCEDURE — 97110 THERAPEUTIC EXERCISES: CPT

## 2022-05-24 NOTE — PROGRESS NOTES
[facility]   Physical Therapy Treatment Note     Name: Tato Godinez  Clinic Number: [id number]    Therapy Diagnosis:   Encounter Diagnosis   Name Primary?    Acute pain of right shoulder Yes     Physician: Davis Lorenzo MD    Visit Date: 5/24/2022      Physician Orders: PT Eval and Treat   Medical Diagnosis from Referral:     M54.6,G89.29 (ICD-10-CM) - Chronic bilateral thoracic back pain      Evaluation Date: 5/4/2022  Authorization Period Expiration: 04/28/2023  Plan of Care Expiration: 06/15/2022  Progress Note Due: 10th visit   Visit # / Visits authorized: 6 / 12  FOTO: 52%/42%     PTA Visit: 0 / 5     Precautions: Standard       Time In: 0700 am   Time Out: 0800 am   Total Appointment Time: 60 minutes   Total Billable Time: 60 minutes     SUBJECTIVE     Pt reports: experienced some stiffness in her neck over the weekend, has recently switched to wearing sports bra's since the real bras are digging into her skin and giving her increased cervical pain. Pt states she did her stretches over the weekend which helped to relieve her pain.  She was compliant with home exercise program.  Response to previous treatment: felt okay after   Functional change: None to note     Pain:       6/10                     ;      3/10  Location: right shoulder and neck ;    Low back     OBJECTIVE     Objective Measures updated at progress report unless specified.     Treatment      Tato received the treatments listed below:       therapeutic exercises to develop strength, endurance, ROM and flexibility for 60 minutes including:  +  Aerobic activity and endurance training for reciprocal motion of lower limbs on Nustep x 10 min at level 5.0 at > or equal to 50 spm w/o rest to increase mobility, blood flow and improve tissue tolerance.        Rhomboid standing; 10 sec x 5 times bilaterally  Prayer stretch; 10 sec x 5 times   Supine Hamstring stretch; 10 sec x 5 times bilaterally     SL clams w/ GTB; 3 x 10  reps bilaterally   Posterior pelvic tilt + glute squeeze + hip ABD w/ GTB; 5 sec hold x 30 times   HL bridging + posterior pelvic tilt; GTB 3 x 10 reps 3 sec hold         Next Visit:   TB rows   TB abduction   TB extension   Standing scaption with chin tuck     manual therapy techniques: Joint mobilizations, Myofacial release and Soft tissue Mobilization were applied to the: right shoulder/thoracic spine for 00 minutes, including:  -functional dry needling next visit (rotator cuff, upper trapezius)   -posterior anterior springing to thoracic and lumbar spine     See EMR under MEDIA for written consent provided 5/10/2022.     Palpation Assessment to determine the necessity for Functional Dry Needling; bilateral upper trap, right rotator cuff insertion, posterior deltoid, bilateral levator scapulae   -Will assess lower back next visit for functional dry needling      hot pack for 00 minutes to right shoulder post session to improve blood flow and decrease muscle pain.    Patient Education and Home Exercises     Home Exercises Provided and Patient Education Provided   - Continue HEP     Education provided:   Written Home Exercises Provided: yes. Exercises were reviewed and Tato was able to demonstrate them prior to the end of the session.  Tato demonstrated good  understanding of the education provided. See EMR under Patient Instructions for exercises provided during therapy sessions    ASSESSMENT   Pt continues to report relief with stretches performed. Pt able to initiate bridging while maintaining posterior pelvic tilt (requiring minimal verbal cues). Pt requiring frequent rest breaks due to poor muscle endurance. Will continue to progress postural strengthening and core stabilization.     Tato Is progressing well towards her goals.   Pt prognosis is Excellent.     Pt will continue to benefit from skilled outpatient physical therapy to address the deficits listed in the problem list box on initial  evaluation, provide pt/family education and to maximize pt's level of independence in the home and community environment.   Pt's spiritual, cultural and educational needs considered and pt agreeable to plan of care and goals.     Anticipated barriers to physical therapy: none to note     GOALS:   Short Term Goals:  3 weeks  1.Report decreased thoracic pain < / =  5/10  to increase tolerance for work activities   2. Increase lumbar range of motion by 10% to increase tolerance for recreational activities   3. Increased strength by 1/3 MMT grade in postural musculature to increase tolerance for ADL and work activities.  4. Pt to tolerate HEP to improve ROM and independence with ADL's     Long Term Goals: 6 weeks  1.Report decreased lumbar pain  < / =  3 /10  to increase tolerance for work activities   2. Increase lumbar range of motion by 20% to increase tolerance for recreational activities   3.Increase strength to >/= 4/5 in right shoulder to increase tolerance for ADL and work activities.  4. Pt goal: Pt able to sit at her desk for work for > 2 hrs with >/= 2/10 thoracic pain   5. Pt to achieve <40% limitation as measured by the FOTO to demonstrate decreased disability.    PLAN   Focus on improving rotator cuff strength and tissue extensibility in lower extremities.     Quynh Baugh, PT, DPT

## 2022-05-24 NOTE — PROGRESS NOTES
[facility]   Physical Therapy Treatment Note     Name: Tato Godinez  Clinic Number: [id number]    Therapy Diagnosis:   Encounter Diagnosis   Name Primary?    Acute pain of right shoulder Yes     Physician: Davis Lorenzo MD    Visit Date: 5/25/2022      Physician Orders: PT Eval and Treat   Medical Diagnosis from Referral:     M54.6,G89.29 (ICD-10-CM) - Chronic bilateral thoracic back pain      Evaluation Date: 5/4/2022  Authorization Period Expiration: 04/28/2023  Plan of Care Expiration: 06/15/2022  Progress Note Due: 10th visit   Visit # / Visits authorized: 7 / 12  FOTO: 52%/42%     PTA Visit: 1 / 5     Precautions: Standard       Time In: 8:00 am   Time Out: 9:00 am  Total Appointment Time: 60 minutes   Total Billable Time: 60 minutes     SUBJECTIVE     Pt reports: she is having some low back pain and just stiffness in the neck . She reports feels having a larger chest really exacerbates her symptoms and she also has trouble finding comfortable bras .   She was compliant with home exercise program.  Response to previous treatment: felt okay after   Functional change: None to note     Pain:       3/10                     ;      5/10  Location: right shoulder and neck ;    Low back     OBJECTIVE     Objective Measures updated at progress report unless specified.     Treatment      Tato received the treatments listed below:       therapeutic exercises to develop strength, endurance, ROM and flexibility for 60 minutes including:  +  Aerobic activity and endurance training for reciprocal motion of lower limbs on Nustep x 8 min at level 5.0 at > or equal to 50 spm w/o rest to increase mobility, blood flow and improve tissue tolerance.      Rhomboid standing; 10 sec x 5 times bilaterally  Prayer stretch; 10 sec x 5 times   Supine Hamstring stretch; 10 sec x 5 times bilaterally     SL clams w/ GTB; 3 x 10 reps bilaterally   Posterior pelvic tilt + glute squeeze + hip ABD w/ GTB; 5 sec  hold x 30 times   HL bridging + posterior pelvic tilt; GTB 3 x 10 reps 3 sec hold     + T-band rows c/ scap retract ;  red thera-cord : 2 x 10 reps   + T-band extension c/ scap retract ; green t-cord : 2 x 10 reps   + T-band horizontal abduction c/ scap retract : 2 x 10 reps       Next Visit:   Supine horizontal abduction RTB : 2 x 10 reps   Seated scapular retractions : x15 reps , 5'' hold   Prone row c/ scap retract : x 10 reps B  Prone abduction c/ scap retract : x 10 reps B  Supine serratus punch, 1# dowel : 2 x 10 reps     manual therapy techniques: Joint mobilizations, Myofacial release and Soft tissue Mobilization were applied to the: right shoulder/thoracic spine for 00 minutes, including:  -functional dry needling next visit (rotator cuff, upper trapezius)   -posterior anterior springing to thoracic and lumbar spine     See EMR under MEDIA for written consent provided 5/10/2022.     Palpation Assessment to determine the necessity for Functional Dry Needling; bilateral upper trap, right rotator cuff insertion, posterior deltoid, bilateral levator scapulae   -Will assess lower back next visit for functional dry needling      hot pack for 00 minutes to right shoulder post session to improve blood flow and decrease muscle pain.    Patient Education and Home Exercises     Home Exercises Provided and Patient Education Provided   - Continue HEP     Education provided:   Written Home Exercises Provided: yes. Exercises were reviewed and Tato was able to demonstrate them prior to the end of the session.  Tato demonstrated good  understanding of the education provided. See EMR under Patient Instructions for exercises provided during therapy sessions    ASSESSMENT   Progressed with periscapular strengthening today which pt responded well to . She exhibits significant glute and hip abd/er weakness and is challenged with glute strengthening . Pt would benefit from continued strength progressions .     Richsreejose g Is  progressing well towards her goals.   Pt prognosis is Excellent.     Pt will continue to benefit from skilled outpatient physical therapy to address the deficits listed in the problem list box on initial evaluation, provide pt/family education and to maximize pt's level of independence in the home and community environment.   Pt's spiritual, cultural and educational needs considered and pt agreeable to plan of care and goals.     Anticipated barriers to physical therapy: none to note     GOALS:   Short Term Goals:  3 weeks  1.Report decreased thoracic pain < / =  5/10  to increase tolerance for work activities   2. Increase lumbar range of motion by 10% to increase tolerance for recreational activities   3. Increased strength by 1/3 MMT grade in postural musculature to increase tolerance for ADL and work activities.  4. Pt to tolerate HEP to improve ROM and independence with ADL's     Long Term Goals: 6 weeks  1.Report decreased lumbar pain  < / =  3 /10  to increase tolerance for work activities   2. Increase lumbar range of motion by 20% to increase tolerance for recreational activities   3.Increase strength to >/= 4/5 in right shoulder to increase tolerance for ADL and work activities.  4. Pt goal: Pt able to sit at her desk for work for > 2 hrs with >/= 2/10 thoracic pain   5. Pt to achieve <40% limitation as measured by the FOTO to demonstrate decreased disability.    PLAN   Focus on improving rotator cuff strength and tissue extensibility in lower extremities.     Afshan Upton, PTA

## 2022-05-25 ENCOUNTER — CLINICAL SUPPORT (OUTPATIENT)
Dept: REHABILITATION | Facility: HOSPITAL | Age: 42
End: 2022-05-25
Payer: MEDICAID

## 2022-05-25 DIAGNOSIS — M25.511 ACUTE PAIN OF RIGHT SHOULDER: Primary | ICD-10-CM

## 2022-05-25 PROCEDURE — 97110 THERAPEUTIC EXERCISES: CPT | Mod: CQ

## 2022-05-31 ENCOUNTER — CLINICAL SUPPORT (OUTPATIENT)
Dept: REHABILITATION | Facility: HOSPITAL | Age: 42
End: 2022-05-31
Payer: MEDICAID

## 2022-05-31 DIAGNOSIS — M25.511 ACUTE PAIN OF RIGHT SHOULDER: Primary | ICD-10-CM

## 2022-05-31 PROCEDURE — 97110 THERAPEUTIC EXERCISES: CPT

## 2022-05-31 PROCEDURE — 97140 MANUAL THERAPY 1/> REGIONS: CPT

## 2022-05-31 NOTE — PROGRESS NOTES
"[facility]   Physical Therapy Treatment Note     Name: Tato Godinez  Clinic Number: [id number]    Therapy Diagnosis:   Encounter Diagnosis   Name Primary?    Acute pain of right shoulder Yes     Physician: Davis Lorenzo MD    Visit Date: 5/31/2022      Physician Orders: PT Eval and Treat   Medical Diagnosis from Referral:     M54.6,G89.29 (ICD-10-CM) - Chronic bilateral thoracic back pain      Evaluation Date: 5/4/2022  Authorization Period Expiration: 04/28/2023  Plan of Care Expiration: 06/15/2022  Progress Note Due: 10th visit   Visit # / Visits authorized: 8 / 12  FOTO: 52%/42%     PTA Visit: 0 / 5     Precautions: Standard       Time In: 0700 am   Time Out: 0730 am  Total Appointment Time: 30 minutes   Total Billable Time: 30 minutes     SUBJECTIVE     Pt reports: Pt states she woke up this morning with her lower back in excruciating pain; states she would like to get functional dry needling today. Pt has to bring her twin daughters to a dentist appointment for 8:00 am this morning, needs to leave early.   She was compliant with home exercise program.  Response to previous treatment: felt okay after   Functional change: None to note     Pain:       2/10                     ;      9/10  Location: right shoulder and neck ;    Low back     OBJECTIVE     Objective Measures updated at progress report unless specified.     Treatment      Tato received the treatments listed below:       therapeutic exercises to develop strength, endurance, ROM and flexibility for 15 minutes including:  +  Aerobic activity and endurance training for reciprocal motion of lower limbs on Nustep x 8 min at level 5.0 at > or equal to 50 spm w/o rest to increase mobility, blood flow and improve tissue tolerance.      Rhomboid standing; 10 sec x 5 times bilaterally  Prayer stretch; 10 sec x 5 times   Lower Trunk Rotations; 5" hold x 10 times   Supine Hamstring stretch; 10 sec x 5 times bilaterally     SL clams " w/ GTB; 3 x 10 reps bilaterally   Posterior pelvic tilt + glute squeeze + hip ABD w/ GTB; 5 sec hold x 30 times   HL bridging + posterior pelvic tilt; GTB 3 x 10 reps 3 sec hold     + T-band rows c/ scap retract ;  red thera-cord : 2 x 10 reps   + T-band extension c/ scap retract ; green t-cord : 2 x 10 reps   + T-band horizontal abduction c/ scap retract : 2 x 10 reps       Next Visit:   Supine horizontal abduction RTB : 2 x 10 reps   Seated scapular retractions : x15 reps , 5'' hold   Prone row c/ scap retract : x 10 reps B  Prone abduction c/ scap retract : x 10 reps B  Supine serratus punch, 1# dowel : 2 x 10 reps     manual therapy techniques: Joint mobilizations, Myofacial release and Soft tissue Mobilization were applied to the: right shoulder/thoracic spine for 15 minutes, including:    -functional dry needling (lumbar paraspinals, multifidi, quadratus lumborum) bilaterally   -posterior anterior springing to lumbar spine     See EMR under MEDIA for written consent provided 5/10/2022.     Palpation Assessment to determine the necessity for Functional Dry Needling; lumbar paraspinals, multifidi, quadratus lumborum - increased pain with forward flexion and extension, unable to rotate to left or right.      hot pack for 10 minutes to right shoulder post session to improve blood flow and decrease muscle pain.    Patient Education and Home Exercises     Home Exercises Provided and Patient Education Provided   - Continue HEP     Education provided:   Written Home Exercises Provided: yes. Exercises were reviewed and Tato was able to demonstrate them prior to the end of the session.  Tato demonstrated good  understanding of the education provided. See EMR under Patient Instructions for exercises provided during therapy sessions    ASSESSMENT   Pt with shortened program today due to schedule conflictions. Pt with improvement in symptoms after functional dry needling and light stretching. Pt with hot pack applied  to lumbar spine to improve blood flow and decrease muscle soreness. Will continue to progress core and postural strengthening according to patient symptoms next visit.   Patient demonstrated appropriate response to Functional Dry Needling. good  rhythmical contractions observed with estim to treated muscle groups.    Tato Is progressing well towards her goals.   Pt prognosis is Excellent.     Pt will continue to benefit from skilled outpatient physical therapy to address the deficits listed in the problem list box on initial evaluation, provide pt/family education and to maximize pt's level of independence in the home and community environment.   Pt's spiritual, cultural and educational needs considered and pt agreeable to plan of care and goals.     Anticipated barriers to physical therapy: none to note     GOALS:   Short Term Goals:  3 weeks  1.Report decreased thoracic pain < / =  5/10  to increase tolerance for work activities   2. Increase lumbar range of motion by 10% to increase tolerance for recreational activities   3. Increased strength by 1/3 MMT grade in postural musculature to increase tolerance for ADL and work activities.  4. Pt to tolerate HEP to improve ROM and independence with ADL's     Long Term Goals: 6 weeks  1.Report decreased lumbar pain  < / =  3 /10  to increase tolerance for work activities   2. Increase lumbar range of motion by 20% to increase tolerance for recreational activities   3.Increase strength to >/= 4/5 in right shoulder to increase tolerance for ADL and work activities.  4. Pt goal: Pt able to sit at her desk for work for > 2 hrs with >/= 2/10 thoracic pain   5. Pt to achieve <40% limitation as measured by the FOTO to demonstrate decreased disability.    PLAN   Focus on improving rotator cuff strength and tissue extensibility in lower extremities. Monitor response to Functional Dry Needling. Continue with Functional Dry Needling in POC as appropriate.      Quynh Baugh, PT,  DPT

## 2022-06-01 ENCOUNTER — CLINICAL SUPPORT (OUTPATIENT)
Dept: REHABILITATION | Facility: HOSPITAL | Age: 42
End: 2022-06-01
Payer: MEDICAID

## 2022-06-01 DIAGNOSIS — M25.511 ACUTE PAIN OF RIGHT SHOULDER: Primary | ICD-10-CM

## 2022-06-01 PROCEDURE — 97110 THERAPEUTIC EXERCISES: CPT

## 2022-06-01 NOTE — PROGRESS NOTES
[facility]   Physical Therapy Treatment Note     Name: Tato Godinez  Clinic Number: [id number]    Therapy Diagnosis:   Encounter Diagnosis   Name Primary?    Acute pain of right shoulder Yes     Physician: Davis Lorenzo MD    Visit Date: 6/1/2022      Physician Orders: PT Eval and Treat   Medical Diagnosis from Referral:     M54.6,G89.29 (ICD-10-CM) - Chronic bilateral thoracic back pain      Evaluation Date: 5/4/2022  Authorization Period Expiration: 04/28/2023  Plan of Care Expiration: 06/15/2022  Progress Note Due: 10th visit   Visit # / Visits authorized: 9 / 12   FOTO: 52%/42%     PTA Visit: 0 / 5     Precautions: Standard       Time In: 0815 am (pt late to therapy)   Time Out: 0900 am  Total Appointment Time: 45 minutes   Total Billable Time: 45 minutes     SUBJECTIVE     Pt reports: Pt with improvement in lower back pain since last visit, pain decreased from 9/10 to 4/10. Pt states she was able to perform stretches and rested.   She was compliant with home exercise program.  Response to previous treatment: Decrease lumbar pain  Functional change: None to note     Pain:       2/10                     ;      4/10  Location: right shoulder and neck ;    Low back     OBJECTIVE     Objective Measures updated at progress report unless specified.     Treatment      Tato received the treatments listed below:       therapeutic exercises to develop strength, endurance, ROM and flexibility for 45 minutes including:  +  Aerobic activity and endurance training for reciprocal motion of lower limbs on Nustep x 6 min at level 1.0 at > or equal to 50 spm w/o rest to increase mobility, blood flow and improve tissue tolerance.      Rhomboid standing; 10 sec x 5 times bilaterally  Prayer stretch; 10 sec x 5 times   Supine Hamstring stretch; 10 sec x 5 times bilaterally     SL clams w/ GTB; 3 x 10 reps bilaterally   Posterior pelvic tilt + glute squeeze + hip ABD w/ GTB; 5 sec hold x 30 times   HL  bridging + posterior pelvic tilt; GTB 3 x 10 reps 3 sec hold   Posterior pelvic tilt + SLR; 2 x 10 reps bilaterally     + T-band rows c/ scap retract ;  red thera-cord : 2 x 10 reps   + T-band extension c/ scap retract ; green t-cord : 2 x 10 reps   + T-band horizontal abduction c/ scap retract : 2 x 10 reps     Next Visit:   Supine horizontal abduction RTB : 2 x 10 reps   Seated scapular retractions : x15 reps , 5'' hold   Prone row c/ scap retract : x 10 reps B  Prone abduction c/ scap retract : x 10 reps B  Supine serratus punch, 1# dowel : 2 x 10 reps     manual therapy techniques: Joint mobilizations, Myofacial release and Soft tissue Mobilization were applied to the: right shoulder/thoracic spine for 00 minutes, including:    -functional dry needling (lumbar paraspinals, multifidi, quadratus lumborum) bilaterally   -posterior anterior springing to lumbar spine     See EMR under MEDIA for written consent provided 5/10/2022.     Palpation Assessment to determine the necessity for Functional Dry Needling; lumbar paraspinals, multifidi, quadratus lumborum - increased pain with forward flexion and extension, unable to rotate to left or right.      hot pack for 00 minutes to right shoulder post session to improve blood flow and decrease muscle pain.    Patient Education and Home Exercises     Home Exercises Provided and Patient Education Provided   - Continue HEP     Education provided:   Written Home Exercises Provided: yes. Exercises were reviewed and Tato was able to demonstrate them prior to the end of the session.  Tato demonstrated good  understanding of the education provided. See EMR under Patient Instructions for exercises provided during therapy sessions    ASSESSMENT   Pt still experiencing moderate difficulty with performing and maintaining posterior pelvic tilt; requiring moderate tactile cueing from PT. Pt lumbar stability gradually improving along with bilateral shoulder stability. In addition,  pt with marked complaints with SL clams with GTB due to bilateral hip weakness. Improvement in rest time noted. Pt will continue to benefit from skilled physical therapy       Tato Is progressing well towards her goals.   Pt prognosis is Excellent.     Pt will continue to benefit from skilled outpatient physical therapy to address the deficits listed in the problem list box on initial evaluation, provide pt/family education and to maximize pt's level of independence in the home and community environment.   Pt's spiritual, cultural and educational needs considered and pt agreeable to plan of care and goals.     Anticipated barriers to physical therapy: none to note     GOALS:   Short Term Goals:  3 weeks  1.Report decreased thoracic pain < / =  5/10  to increase tolerance for work activities   2. Increase lumbar range of motion by 10% to increase tolerance for recreational activities   3. Increased strength by 1/3 MMT grade in postural musculature to increase tolerance for ADL and work activities.  4. Pt to tolerate HEP to improve ROM and independence with ADL's     Long Term Goals: 6 weeks  1.Report decreased lumbar pain  < / =  3 /10  to increase tolerance for work activities   2. Increase lumbar range of motion by 20% to increase tolerance for recreational activities   3.Increase strength to >/= 4/5 in right shoulder to increase tolerance for ADL and work activities.  4. Pt goal: Pt able to sit at her desk for work for > 2 hrs with >/= 2/10 thoracic pain   5. Pt to achieve <40% limitation as measured by the FOTO to demonstrate decreased disability.    PLAN   Focus on improving rotator cuff strength and tissue extensibility in lower extremities.     Quynh Baugh, PT, DPT

## 2022-06-04 ENCOUNTER — PATIENT MESSAGE (OUTPATIENT)
Dept: FAMILY MEDICINE | Facility: HOSPITAL | Age: 42
End: 2022-06-04
Payer: MEDICAID

## 2022-06-07 ENCOUNTER — CLINICAL SUPPORT (OUTPATIENT)
Dept: REHABILITATION | Facility: HOSPITAL | Age: 42
End: 2022-06-07
Payer: MEDICAID

## 2022-06-07 DIAGNOSIS — M25.511 ACUTE PAIN OF RIGHT SHOULDER: Primary | ICD-10-CM

## 2022-06-07 PROCEDURE — 97110 THERAPEUTIC EXERCISES: CPT

## 2022-06-07 NOTE — PROGRESS NOTES
[facility]   Physical Therapy Treatment Note     Name: Tato Godinez  Clinic Number: [id number]    Therapy Diagnosis:   Encounter Diagnosis   Name Primary?    Acute pain of right shoulder Yes     Physician: Davis Lorenzo MD    Visit Date: 6/7/2022      Physician Orders: PT Eval and Treat   Medical Diagnosis from Referral:     M54.6,G89.29 (ICD-10-CM) - Chronic bilateral thoracic back pain      Evaluation Date: 5/4/2022  Authorization Period Expiration: 04/28/2023  Plan of Care Expiration: 06/15/2022  Progress Note Due: 10th visit   Visit # / Visits authorized: 10 / 12   FOTO: 52%/42%     PTA Visit: 0 / 5     Precautions: Standard       Time In: 0715 am (pt late to therapy)   Time Out: 0800 am  Total Appointment Time: 45 minutes   Total Billable Time: 45 minutes     SUBJECTIVE     Pt reports: Pt states she worked in the yard over the weekend, she experienced a flare in lower back pain but was able to stretch, take medication and lay on the heating pad for relief.   She was compliant with home exercise program.  Response to previous treatment: Decrease lumbar pain  Functional change: None to note     Pain:       4/10                     ;      5/10  Location: right shoulder and neck ;    Low back     OBJECTIVE     Objective Measures updated at progress report unless specified.     Treatment      Tato received the treatments listed below:       therapeutic exercises to develop strength, endurance, ROM and flexibility for 45 minutes including:  +  Aerobic activity and endurance training for reciprocal motion of lower limbs on Nustep x 6 min at level 5.0 at > or equal to 50 spm w/o rest to increase mobility, blood flow and improve tissue tolerance.     SL clams w/ GTB; 3 x 10 reps bilaterally   HL bridging + posterior pelvic tilt; GTB 3 x 10 reps 3 sec hold   Posterior pelvic tilt + SLR; 2 x 10 reps bilaterally    Standing hip extension; 3 x 10 reps; 2 lb AW  Standing hip abduction; 3 x 10  reps; 2 lb AW   Posterior pelvic tilt on stability ball; 3 x 10 reps 5 sec hold   Posterior pelvic tilt on stability with march; 2 x 10 reps     + T-band rows c/ scap retract ;  red thera-cord : 2 x 10 reps - NT  + T-band extension c/ scap retract ; green t-cord : 2 x 10 reps - NT  + T-band horizontal abduction c/ scap retract : 2 x 10 reps - NT    Next Visit:   Supine horizontal abduction RTB : 2 x 10 reps   Seated scapular retractions : x15 reps , 5'' hold   Prone row c/ scap retract : x 10 reps B  Prone abduction c/ scap retract : x 10 reps B  Supine serratus punch, 1# dowel : 2 x 10 reps     manual therapy techniques: Joint mobilizations, Myofacial release and Soft tissue Mobilization were applied to the: right shoulder/thoracic spine for 00 minutes, including:    -functional dry needling (lumbar paraspinals, multifidi, quadratus lumborum) bilaterally   -posterior anterior springing to lumbar spine     See EMR under MEDIA for written consent provided 5/10/2022.     Palpation Assessment to determine the necessity for Functional Dry Needling; lumbar paraspinals, multifidi, quadratus lumborum - increased pain with forward flexion and extension, unable to rotate to left or right.      hot pack for 00 minutes to right shoulder post session to improve blood flow and decrease muscle pain.    Patient Education and Home Exercises     Home Exercises Provided and Patient Education Provided   - Continue HEP     Education provided:   Written Home Exercises Provided: yes. Exercises were reviewed and Tato was able to demonstrate them prior to the end of the session.  Tato demonstrated good  understanding of the education provided. See EMR under Patient Instructions for exercises provided during therapy sessions    ASSESSMENT   Improvement in rest time noted. Pt able to initiate CKC strengthening; required moderate verbal cueing to maintain posterior pelvic tilt during standing hip extensions to decrease muscle tension in  lower back. Pt challeneged with abdominal strengthening on stability ball. Pt will continue to benefit from skilled physical therapy to decrease tension in lower back and improve abdominal strength with functional activities.       Tato Is progressing well towards her goals.   Pt prognosis is Excellent.     Pt will continue to benefit from skilled outpatient physical therapy to address the deficits listed in the problem list box on initial evaluation, provide pt/family education and to maximize pt's level of independence in the home and community environment.   Pt's spiritual, cultural and educational needs considered and pt agreeable to plan of care and goals.     Anticipated barriers to physical therapy: none to note     GOALS:   Short Term Goals:  3 weeks  1.Report decreased thoracic pain < / =  5/10  to increase tolerance for work activities   2. Increase lumbar range of motion by 10% to increase tolerance for recreational activities   3. Increased strength by 1/3 MMT grade in postural musculature to increase tolerance for ADL and work activities.  4. Pt to tolerate HEP to improve ROM and independence with ADL's     Long Term Goals: 6 weeks  1.Report decreased lumbar pain  < / =  3 /10  to increase tolerance for work activities   2. Increase lumbar range of motion by 20% to increase tolerance for recreational activities   3.Increase strength to >/= 4/5 in right shoulder to increase tolerance for ADL and work activities.  4. Pt goal: Pt able to sit at her desk for work for > 2 hrs with >/= 2/10 thoracic pain   5. Pt to achieve <40% limitation as measured by the FOTO to demonstrate decreased disability.    PLAN   Focus on improving abdominal control with functional strengthening.     Quynh Baugh, PT, DPT

## 2022-06-08 ENCOUNTER — CLINICAL SUPPORT (OUTPATIENT)
Dept: REHABILITATION | Facility: HOSPITAL | Age: 42
End: 2022-06-08
Payer: MEDICAID

## 2022-06-08 DIAGNOSIS — M25.511 ACUTE PAIN OF RIGHT SHOULDER: Primary | ICD-10-CM

## 2022-06-08 PROCEDURE — 97110 THERAPEUTIC EXERCISES: CPT

## 2022-06-08 NOTE — PROGRESS NOTES
[facility]   Physical Therapy Treatment Note     Name: Tato Godinez  Clinic Number: [id number]    Therapy Diagnosis:   Encounter Diagnosis   Name Primary?    Acute pain of right shoulder Yes     Physician: Davis Lorenzo MD    Visit Date: 6/8/2022      Physician Orders: PT Eval and Treat   Medical Diagnosis from Referral:     M54.6,G89.29 (ICD-10-CM) - Chronic bilateral thoracic back pain      Evaluation Date: 5/4/2022  Authorization Period Expiration: 04/28/2023  Plan of Care Expiration: 06/15/2022  Progress Note Due: 10th visit   Visit # / Visits authorized: 11 / 12   FOTO: 52%/42%     PTA Visit: 0 / 5     Precautions: Standard       Time In: 0810 am (pt late to therapy)   Time Out: 0900 am  Total Appointment Time: 50 minutes   Total Billable Time: 50 minutes     SUBJECTIVE     Pt reports: Felt minimal soreness after last visit; still experiencing increased pain in mid back.   She was compliant with home exercise program.  Response to previous treatment: Decrease lumbar pain  Functional change: None to note     Pain:       5/10                     ;      3/10  Location: right shoulder and neck ;    Low back     OBJECTIVE     Objective Measures updated at progress report unless specified.     Treatment      Tato received the treatments listed below:       therapeutic exercises to develop strength, endurance, ROM and flexibility for 45 minutes including:  +  Aerobic activity and endurance training for reciprocal motion of lower limbs on Nustep x 6 min at level 7.0 at > or equal to 50 spm w/o rest to increase mobility, blood flow and improve tissue tolerance.     SL clams w/ GTB; 3 x 10 reps bilaterally   HL bridging + posterior pelvic tilt; GTB 3 x 10 reps 3 sec hold   Posterior pelvic tilt + SLR; 2 x 10 reps bilaterally; 2 lb AW   Standing hip extension; 3 x 10 reps; 2 lb AW   Standing hip abduction; 3 x 10 reps; 2 lb AW   Posterior pelvic tilt on stability ball; 3 x 10 reps 5 sec  hold   Posterior pelvic tilt on stability with march; 2 x 10 reps     + T-band rows c/ scap retract ;  red thera-cord : 2 x 10 reps - NT  + T-band extension c/ scap retract ; green t-cord : 2 x 10 reps - NT  + T-band horizontal abduction c/ scap retract : 2 x 10 reps - NT    Next Visit:   Supine horizontal abduction RTB : 2 x 10 reps   Seated scapular retractions : x15 reps , 5'' hold   Prone row c/ scap retract : x 10 reps B  Prone abduction c/ scap retract : x 10 reps B  Supine serratus punch, 1# dowel : 2 x 10 reps     manual therapy techniques: Joint mobilizations, Myofacial release and Soft tissue Mobilization were applied to the: right shoulder/thoracic spine for 00 minutes, including:    -functional dry needling (lumbar paraspinals, multifidi, quadratus lumborum) bilaterally   -posterior anterior springing to lumbar spine     See EMR under MEDIA for written consent provided 5/10/2022.     Palpation Assessment to determine the necessity for Functional Dry Needling; lumbar paraspinals, multifidi, quadratus lumborum - increased pain with forward flexion and extension, unable to rotate to left or right.      hot pack for 00 minutes to right shoulder post session to improve blood flow and decrease muscle pain.    Patient Education and Home Exercises     Home Exercises Provided and Patient Education Provided   - Continue HEP     Education provided:   Written Home Exercises Provided: yes. Exercises were reviewed and Tato was able to demonstrate them prior to the end of the session.  Tato demonstrated good  understanding of the education provided. See EMR under Patient Instructions for exercises provided during therapy sessions    ASSESSMENT   Pt able to perform table exercises with no rest time noted. Pt with no complaints of lower back pain with standing hip strengthening. Pt continuing to be challeneged with abdominal strengthening on stability ball. Pt requiring rest break with seated marching on stability  adelaida; poor motor control. Pt will continue to benefit from skilled physical therapy to decrease tension in lower back and improve abdominal strength with functional activities.       Tato Is progressing well towards her goals.   Pt prognosis is Excellent.     Pt will continue to benefit from skilled outpatient physical therapy to address the deficits listed in the problem list box on initial evaluation, provide pt/family education and to maximize pt's level of independence in the home and community environment.   Pt's spiritual, cultural and educational needs considered and pt agreeable to plan of care and goals.     Anticipated barriers to physical therapy: none to note     GOALS:   Short Term Goals:  3 weeks  1.Report decreased thoracic pain < / =  5/10  to increase tolerance for work activities   2. Increase lumbar range of motion by 10% to increase tolerance for recreational activities   3. Increased strength by 1/3 MMT grade in postural musculature to increase tolerance for ADL and work activities.  4. Pt to tolerate HEP to improve ROM and independence with ADL's     Long Term Goals: 6 weeks  1.Report decreased lumbar pain  < / =  3 /10  to increase tolerance for work activities   2. Increase lumbar range of motion by 20% to increase tolerance for recreational activities   3.Increase strength to >/= 4/5 in right shoulder to increase tolerance for ADL and work activities.  4. Pt goal: Pt able to sit at her desk for work for > 2 hrs with >/= 2/10 thoracic pain   5. Pt to achieve <40% limitation as measured by the FOTO to demonstrate decreased disability.    PLAN   Focus on improving abdominal control with functional strengthening.     Quynh Baugh, PT, DPT

## 2022-06-15 ENCOUNTER — CLINICAL SUPPORT (OUTPATIENT)
Dept: REHABILITATION | Facility: HOSPITAL | Age: 42
End: 2022-06-15
Payer: MEDICAID

## 2022-06-15 DIAGNOSIS — M25.511 ACUTE PAIN OF RIGHT SHOULDER: Primary | ICD-10-CM

## 2022-06-15 PROCEDURE — 97110 THERAPEUTIC EXERCISES: CPT

## 2022-06-15 NOTE — PROGRESS NOTES
[facility]   Physical Therapy Treatment Note     Name: Tato Godinez  Clinic Number: [id number]    Therapy Diagnosis:   Encounter Diagnosis   Name Primary?    Acute pain of right shoulder Yes     Physician: Davis Lorenzo MD    Visit Date: 6/15/2022      Physician Orders: PT Eval and Treat   Medical Diagnosis from Referral:     M54.6,G89.29 (ICD-10-CM) - Chronic bilateral thoracic back pain      Evaluation Date: 5/4/2022  Authorization Period Expiration: 04/28/2023  Plan of Care Expiration: 06/15/2022  Progress Note Due: 10th visit   Visit # / Visits authorized: 11 / 12   FOTO: 52%/42%     PTA Visit: 0 / 5     Precautions: Standard       Time In: 0800 am   Time Out: 0850 am  Total Appointment Time: 50 minutes   Total Billable Time: 50 minutes (2 TE) - medicaid      SUBJECTIVE     Pt reports: Returns from vacation; states sitting on the 3 hr flight irritated her back. She was unable to stretch after due to being tired after her flight. Increased pain today due to sitting in a sustained position for extended period of time.   She was compliant with home exercise program.  Response to previous treatment: Decrease lumbar pain  Functional change: None to note     Pain:       2/10                     ;      6/10  Location: right shoulder and neck ;    Low back     OBJECTIVE     Objective Measures updated at progress report unless specified.     Treatment      Tato received the treatments listed below:       therapeutic exercises to develop strength, endurance, ROM and flexibility for 15 minutes including:  +  Aerobic activity and endurance training for reciprocal motion of lower limbs on Nustep x 6 min at level 7.0 at > or equal to 50 spm w/o rest to increase mobility, blood flow and improve tissue tolerance.     SL clams w/ GTB; 3 x 10 reps bilaterally   HL bridging + posterior pelvic tilt; GTB 3 x 10 reps 3 sec hold   Posterior pelvic tilt + SLR; 2 x 10 reps bilaterally; 2 lb AW   Standing  hip extension; 3 x 10 reps; 2 lb AW   Standing hip abduction; 3 x 10 reps; 2 lb AW   Posterior pelvic tilt on stability ball; 3 x 10 reps 5 sec hold   Posterior pelvic tilt on stability with march; 2 x 10 reps     + T-band rows c/ scap retract ;  red thera-cord : 2 x 10 reps - NT  + T-band extension c/ scap retract ; green t-cord : 2 x 10 reps - NT  + T-band horizontal abduction c/ scap retract : 2 x 10 reps - NT    Next Visit:   Supine horizontal abduction RTB : 2 x 10 reps   Seated scapular retractions : x15 reps , 5'' hold   Prone row c/ scap retract : x 10 reps B  Prone abduction c/ scap retract : x 10 reps B  Supine serratus punch, 1# dowel : 2 x 10 reps     manual therapy techniques: Joint mobilizations, Myofacial release and Soft tissue Mobilization were applied to the: right shoulder/thoracic spine for 20 minutes, including:    -functional dry needling (lumbar paraspinals, multifidi, quadratus lumborum) bilaterally   -posterior anterior springing to lumbar spine     See EMR under MEDIA for written consent provided 5/10/2022.     Palpation Assessment to determine the necessity for Functional Dry Needling; lumbar paraspinals, multifidi, quadratus lumborum - increased pain with forward flexion and extension, unable to rotate to left or right.      hot pack for 00 minutes to right shoulder post session to improve blood flow and decrease muscle pain.    Patient Education and Home Exercises     Home Exercises Provided and Patient Education Provided   - Continue HEP     Education provided:   Written Home Exercises Provided: yes. Exercises were reviewed and Tato was able to demonstrate them prior to the end of the session.  Tato demonstrated good  understanding of the education provided. See EMR under Patient Instructions for exercises provided during therapy sessions    ASSESSMENT   Pt with increased pain today; experiencing a flare in lower back pain due to recent trip to [place]. Pt able to perform  endurance training at beginning + table hip strengthening but unable to complete bridges without marked complaints of lower back pain. Pt with improvement in symptoms following hot pack and functional dry needling to lumbar paraspinals and multifidi. Patient demonstrated appropriate response to Functional Dry Needling. good  rhythmical contractions observed without estim to treated muscle groups.        Tato Is progressing well towards her goals.   Pt prognosis is Excellent.     Pt will continue to benefit from skilled outpatient physical therapy to address the deficits listed in the problem list box on initial evaluation, provide pt/family education and to maximize pt's level of independence in the home and community environment.   Pt's spiritual, cultural and educational needs considered and pt agreeable to plan of care and goals.     Anticipated barriers to physical therapy: none to note     GOALS:   Short Term Goals:  3 weeks  1.Report decreased thoracic pain < / =  5/10  to increase tolerance for work activities   2. Increase lumbar range of motion by 10% to increase tolerance for recreational activities   3. Increased strength by 1/3 MMT grade in postural musculature to increase tolerance for ADL and work activities.  4. Pt to tolerate HEP to improve ROM and independence with ADL's     Long Term Goals: 6 weeks  1.Report decreased lumbar pain  < / =  3 /10  to increase tolerance for work activities   2. Increase lumbar range of motion by 20% to increase tolerance for recreational activities   3.Increase strength to >/= 4/5 in right shoulder to increase tolerance for ADL and work activities.  4. Pt goal: Pt able to sit at her desk for work for > 2 hrs with >/= 2/10 thoracic pain   5. Pt to achieve <40% limitation as measured by the FOTO to demonstrate decreased disability.    PLAN   Focus on improving abdominal control with functional strengthening. Monitor response to Functional Dry Needling. Continue with  Functional Dry Needling in POC as appropriate.      Quynh Lupis, PT, DPT

## 2022-07-25 NOTE — PROGRESS NOTES
Subjective:       Patient ID: Tato Godinez is a 41 y.o. female.    Chief Complaint: Follow-up and Urinary Tract Infection    40 yo M presenting for f/u of  R shoulder impingement, chronic thoracic back pain. Referred to PT for back and shoulder, as well as breast surgery for eval of possible breast reduction surgery at last visit. Pt endorses R shoulder has improved with naproxen and outpatient PT. Pt reports PT was focused on back pain. Reports needling and acupuncture helped initially. However continues to have thoracic back pain. Pt was initially referred to [facility] for breast surgery evaluation, however given insurance issues, she was not able to be seen at [facility]. Pt still desires breast reduction surgery. Pt report compliance with Home exercises for back therapy. Continues to endorse feeling mid back pain. Wears sports bra however endorsing sports bra may be too tight for her breasts and the sports bra is causing pressure on her upper back and lower neck.     Reports urine with foul odor for 1 day. 1 week ago, she had increased urinary frequency, urgency, however no incontinence. Denies dysuria or hematuria. Symptoms have mostly resolved with hydration. Just noticed urine looks somewhat cloudy and with different odor today.     Review of Systems   Constitutional: Negative for chills and fever.   HENT: Negative for congestion, rhinorrhea and sore throat.    Eyes: Negative for visual disturbance.   Respiratory: Negative for cough and shortness of breath.    Cardiovascular: Negative for chest pain.   Gastrointestinal: Negative for abdominal pain, nausea and vomiting.   Genitourinary: Negative for difficulty urinating, dysuria, flank pain, frequency, hematuria and urgency.   Musculoskeletal: Positive for back pain (thoracic back pain) and neck pain (due to sports bra causing pressure on posterior neck). Negative for gait problem.   Neurological: Negative for weakness and headaches.       Objective:      Vitals:     07/26/22 1007   BP: 114/73   Pulse: 69     Physical Exam  Vitals and nursing note reviewed.   Constitutional:       General: She is not in acute distress.     Appearance: Normal appearance. She is not ill-appearing.   Eyes:      General:         Right eye: No discharge.         Left eye: No discharge.      Conjunctiva/sclera: Conjunctivae normal.   Cardiovascular:      Rate and Rhythm: Normal rate and regular rhythm.      Heart sounds: Normal heart sounds. No murmur heard.  Pulmonary:      Effort: Pulmonary effort is normal. No respiratory distress.      Breath sounds: Normal breath sounds. No wheezing.   Abdominal:      General: Bowel sounds are normal.      Palpations: Abdomen is soft.      Tenderness: There is no abdominal tenderness.   Musculoskeletal:      Comments: Negative midline spinal tenderness on palpation of upper and mid thoracic back. Tenderness on palpation of paramedian thoracic and lumbar back.   Neurological:      Mental Status: She is alert and oriented to person, place, and time.   Psychiatric:         Behavior: Behavior normal.         Assessment:       1. Cystitis    2. Chronic bilateral thoracic back pain        Plan:       Cystitis  -     nitrofurantoin, macrocrystal-monohydrate, (MACROBID) 100 MG capsule; Take 1 capsule (100 mg total) by mouth 2 (two) times daily. for 5 days  Dispense: 10 capsule; Refill: 0    Chronic bilateral thoracic back pain    Will need to discuss with referral coordinator about finding provider for evaluation of breast reduction surgery. Advised to continue home therapy for back and maintain physical activity for weight loss.    Advised close f/u with Ob/gyn for annual well women exam, pt due to mammogram in November, she will make f/u with established ob/gyn.     POCT UA showed trace leuks, negative nitrates, blood. Urine noted to be clear. Given improvement in urinary symptoms over the last week with hydration, likely urinary symptoms resolving. Discussed with pt  if symptoms do not resvoled, can take macrobid. Pt will monitor for 1-2 days and take macrobid if needed. Advised to f/u in clinic soon if urinary symptoms persist.      Follow up if symptoms worsen or fail to improve.

## 2022-07-26 ENCOUNTER — OFFICE VISIT (OUTPATIENT)
Dept: FAMILY MEDICINE | Facility: HOSPITAL | Age: 42
End: 2022-07-26
Payer: MEDICAID

## 2022-07-26 VITALS
BODY MASS INDEX: 34.32 KG/M2 | HEIGHT: 62 IN | HEART RATE: 69 BPM | WEIGHT: 186.5 LBS | DIASTOLIC BLOOD PRESSURE: 73 MMHG | SYSTOLIC BLOOD PRESSURE: 114 MMHG

## 2022-07-26 DIAGNOSIS — N30.90 CYSTITIS: Primary | ICD-10-CM

## 2022-07-26 DIAGNOSIS — M54.6 CHRONIC BILATERAL THORACIC BACK PAIN: ICD-10-CM

## 2022-07-26 DIAGNOSIS — G89.29 CHRONIC BILATERAL THORACIC BACK PAIN: ICD-10-CM

## 2022-07-26 PROCEDURE — 99213 OFFICE O/P EST LOW 20 MIN: CPT | Performed by: STUDENT IN AN ORGANIZED HEALTH CARE EDUCATION/TRAINING PROGRAM

## 2022-07-26 RX ORDER — NITROFURANTOIN 25; 75 MG/1; MG/1
100 CAPSULE ORAL 2 TIMES DAILY
Qty: 10 CAPSULE | Refills: 0 | Status: SHIPPED | OUTPATIENT
Start: 2022-07-26 | End: 2022-07-31

## 2022-07-28 ENCOUNTER — TELEPHONE (OUTPATIENT)
Dept: FAMILY MEDICINE | Facility: HOSPITAL | Age: 42
End: 2022-07-28
Payer: MEDICAID

## 2022-11-21 ENCOUNTER — OFFICE VISIT (OUTPATIENT)
Dept: OBSTETRICS AND GYNECOLOGY | Facility: CLINIC | Age: 42
End: 2022-11-21
Payer: MEDICAID

## 2022-11-21 VITALS
SYSTOLIC BLOOD PRESSURE: 122 MMHG | DIASTOLIC BLOOD PRESSURE: 80 MMHG | BODY MASS INDEX: 33.95 KG/M2 | HEIGHT: 62 IN | WEIGHT: 184.5 LBS

## 2022-11-21 DIAGNOSIS — Z01.419 WOMEN'S ANNUAL ROUTINE GYNECOLOGICAL EXAMINATION: Primary | ICD-10-CM

## 2022-11-21 DIAGNOSIS — Z11.3 SCREEN FOR STD (SEXUALLY TRANSMITTED DISEASE): ICD-10-CM

## 2022-11-21 DIAGNOSIS — M54.9 BACK PAIN, UNSPECIFIED BACK LOCATION, UNSPECIFIED BACK PAIN LATERALITY, UNSPECIFIED CHRONICITY: ICD-10-CM

## 2022-11-21 PROCEDURE — 3044F HG A1C LEVEL LT 7.0%: CPT | Mod: CPTII,,, | Performed by: ADVANCED PRACTICE MIDWIFE

## 2022-11-21 PROCEDURE — 99395 PR PREVENTIVE VISIT,EST,18-39: ICD-10-PCS | Mod: S$PBB,,, | Performed by: ADVANCED PRACTICE MIDWIFE

## 2022-11-21 PROCEDURE — 3079F DIAST BP 80-89 MM HG: CPT | Mod: CPTII,,, | Performed by: ADVANCED PRACTICE MIDWIFE

## 2022-11-21 PROCEDURE — 87624 HPV HI-RISK TYP POOLED RSLT: CPT | Performed by: ADVANCED PRACTICE MIDWIFE

## 2022-11-21 PROCEDURE — 99395 PREV VISIT EST AGE 18-39: CPT | Mod: S$PBB,,, | Performed by: ADVANCED PRACTICE MIDWIFE

## 2022-11-21 PROCEDURE — 3074F PR MOST RECENT SYSTOLIC BLOOD PRESSURE < 130 MM HG: ICD-10-PCS | Mod: CPTII,,, | Performed by: ADVANCED PRACTICE MIDWIFE

## 2022-11-21 PROCEDURE — 3074F SYST BP LT 130 MM HG: CPT | Mod: CPTII,,, | Performed by: ADVANCED PRACTICE MIDWIFE

## 2022-11-21 PROCEDURE — 81514 NFCT DS BV&VAGINITIS DNA ALG: CPT | Performed by: ADVANCED PRACTICE MIDWIFE

## 2022-11-21 PROCEDURE — 88175 CYTOPATH C/V AUTO FLUID REDO: CPT | Performed by: ADVANCED PRACTICE MIDWIFE

## 2022-11-21 PROCEDURE — 3008F PR BODY MASS INDEX (BMI) DOCUMENTED: ICD-10-PCS | Mod: CPTII,,, | Performed by: ADVANCED PRACTICE MIDWIFE

## 2022-11-21 PROCEDURE — 87491 CHLMYD TRACH DNA AMP PROBE: CPT | Performed by: ADVANCED PRACTICE MIDWIFE

## 2022-11-21 PROCEDURE — 3044F PR MOST RECENT HEMOGLOBIN A1C LEVEL <7.0%: ICD-10-PCS | Mod: CPTII,,, | Performed by: ADVANCED PRACTICE MIDWIFE

## 2022-11-21 PROCEDURE — 99999 PR PBB SHADOW E&M-EST. PATIENT-LVL III: ICD-10-PCS | Mod: PBBFAC,,, | Performed by: ADVANCED PRACTICE MIDWIFE

## 2022-11-21 PROCEDURE — 1159F PR MEDICATION LIST DOCUMENTED IN MEDICAL RECORD: ICD-10-PCS | Mod: CPTII,,, | Performed by: ADVANCED PRACTICE MIDWIFE

## 2022-11-21 PROCEDURE — 99213 OFFICE O/P EST LOW 20 MIN: CPT | Mod: PBBFAC,PN | Performed by: ADVANCED PRACTICE MIDWIFE

## 2022-11-21 PROCEDURE — 87591 N.GONORRHOEAE DNA AMP PROB: CPT | Performed by: ADVANCED PRACTICE MIDWIFE

## 2022-11-21 PROCEDURE — 99999 PR PBB SHADOW E&M-EST. PATIENT-LVL III: CPT | Mod: PBBFAC,,, | Performed by: ADVANCED PRACTICE MIDWIFE

## 2022-11-21 PROCEDURE — 3008F BODY MASS INDEX DOCD: CPT | Mod: CPTII,,, | Performed by: ADVANCED PRACTICE MIDWIFE

## 2022-11-21 PROCEDURE — 1159F MED LIST DOCD IN RCRD: CPT | Mod: CPTII,,, | Performed by: ADVANCED PRACTICE MIDWIFE

## 2022-11-21 PROCEDURE — 3079F PR MOST RECENT DIASTOLIC BLOOD PRESSURE 80-89 MM HG: ICD-10-PCS | Mod: CPTII,,, | Performed by: ADVANCED PRACTICE MIDWIFE

## 2022-11-21 RX ORDER — FLUCONAZOLE 150 MG/1
TABLET ORAL
COMMUNITY
Start: 2022-10-31 | End: 2023-06-13

## 2022-11-21 NOTE — PROGRESS NOTES
HISTORY OF PRESENT ILLNESS:    Tato Godinez is a 41 y.o. female, , Patient's last menstrual period was 2022.,  presents for a routine annual exam . Pt  continues with thoracic back/ shoulder pain sec to breast size. Pt declines BCM at this time.    History reviewed. No pertinent past medical history.    Past Surgical History:   Procedure Laterality Date     SECTION      HAND SURGERY         MEDICATIONS AND ALLERGIES:      Current Outpatient Medications:     albuterol (VENTOLIN HFA) 90 mcg/actuation inhaler, Inhale 2 puffs into the lungs every 6 (six) hours as needed for Wheezing. Rescue (Patient not taking: Reported on 2022), Disp: 18 g, Rfl: 0    fluconazole (DIFLUCAN) 150 MG Tab, Take by mouth., Disp: , Rfl:     Review of patient's allergies indicates:  No Known Allergies    Family History   Problem Relation Age of Onset    Diabetes Maternal Grandmother     Cancer Maternal Grandmother     Colon cancer Maternal Uncle     Breast cancer Neg Hx     Ovarian cancer Neg Hx        Social History     Socioeconomic History    Marital status:    Tobacco Use    Smoking status: Never    Smokeless tobacco: Never   Substance and Sexual Activity    Alcohol use: Yes     Comment: socially    Drug use: No    Sexual activity: Yes     Partners: Male     Birth control/protection: Condom       COMPREHENSIVE GYN HISTORY:  PAP History: Denies abnormal Paps.  Infection History: Denies STDs. Denies PID.  Benign History: Denies uterine fibroids. Denies ovarian cysts. Denies endometriosis. Denies other conditions.  Cancer History: Denies cervical cancer. Denies uterine cancer or hyperplasia. Denies ovarian cancer. Denies vulvar cancer or pre-cancer. Denies vaginal cancer or pre-cancer. Denies breast cancer. Denies colon cancer.  Sexual Activity History:  currently  sexually active  Menstrual History: Monthly  Dysmenorrhea History:None  Contraception:None    ROS:  GENERAL: No weight changes. No swelling.  "No fatigue. No fever.  CARDIOVASCULAR: No chest pain. No shortness of breath. No leg cramps.   NEUROLOGICAL: No headaches. No vision changes.  BREASTS: No pain. No lumps. No discharge.  ABDOMEN: No pain. No nausea. No vomiting. No diarrhea. No constipation.  REPRODUCTIVE: No abnormal bleeding.   VULVA: No pain. No lesions. No itching.  VAGINA: No relaxation. No itching. No odor. No discharge. No lesions.  URINARY: No incontinence. No nocturia. No frequency. No dysuria.    /80   Ht 5' 2" (1.575 m)   Wt 83.7 kg (184 lb 8.4 oz)   LMP 11/16/2022   BMI 33.75 kg/m²     PE:  APPEARANCE: Well nourished, well developed, in no acute distress.  AFFECT: WNL, alert and oriented x 3. Interactive during exam  SKIN: No acne or hirsutism.  NECK: Neck symmetric, without masses or thyromegaly.  NODES: No inguinal, axillary or femoral lymph node enlargement.  CHEST: Good respiratory effort.   ABDOMEN: Soft. No tenderness or masses. No hepatosplenomegaly. No hernias.  BREASTS:  PENDULOUS, Symmetrical, no skin changes, visible lesions, palpable masses or nipple discharge bilaterally.  PELVIC: External female genitalia without lesions.  Female hair distribution. Adequate perineal body, Normal urethral meatus. Vagina moist and well rugated without lesions or discharge.  No significant cystocele or rectocele present. Cervix pink without lesions, discharge or tenderness. Uterus is 4-6 week size, regular, mobile and nontender. Adnexa without masses or tenderness.  EXTREMITIES: No edema    PROCEDURES:  Pap    DIAGNOSIS:  1. Gyn exam    LABS AND TESTS ORDERED:    MEDICATIONS PRESCRIBED:    COUNSELING:  The patient was counseled today on:  -A.C.S. Pap and pelvic exam guidelines, recomendations for yearly mammogram, monthly self breast exams and to follow up with her PCP for other health maintenance.  - Referral placed to [facility]/ breast surgery for consult re breast reduction    FOLLOW-UP- annually.      " yes

## 2022-11-22 ENCOUNTER — HOSPITAL ENCOUNTER (OUTPATIENT)
Dept: RADIOLOGY | Facility: HOSPITAL | Age: 42
Discharge: HOME | End: 2022-11-22
Attending: ADVANCED PRACTICE MIDWIFE
Payer: MEDICAID

## 2022-11-22 DIAGNOSIS — Z01.419 WOMEN'S ANNUAL ROUTINE GYNECOLOGICAL EXAMINATION: ICD-10-CM

## 2022-11-22 LAB
BACTERIAL VAGINOSIS DNA: NEGATIVE
C TRACH DNA SPEC QL NAA+PROBE: NOT DETECTED
CANDIDA GLABRATA DNA: NEGATIVE
CANDIDA KRUSEI DNA: NEGATIVE
CANDIDA RRNA VAG QL PROBE: NEGATIVE
N GONORRHOEA DNA SPEC QL NAA+PROBE: NOT DETECTED
T VAGINALIS RRNA GENITAL QL PROBE: NEGATIVE

## 2022-11-22 PROCEDURE — 77063 BREAST TOMOSYNTHESIS BI: CPT | Mod: 26,,, | Performed by: RADIOLOGY

## 2022-11-22 PROCEDURE — 77063 MAMMO DIGITAL SCREENING BILAT WITH TOMO: ICD-10-PCS | Mod: 26,,, | Performed by: RADIOLOGY

## 2022-11-22 PROCEDURE — 77067 MAMMO DIGITAL SCREENING BILAT WITH TOMO: ICD-10-PCS | Mod: 26,,, | Performed by: RADIOLOGY

## 2022-11-22 PROCEDURE — 77067 SCR MAMMO BI INCL CAD: CPT | Mod: 26,,, | Performed by: RADIOLOGY

## 2022-11-22 PROCEDURE — 77063 BREAST TOMOSYNTHESIS BI: CPT | Mod: TC

## 2022-11-22 PROCEDURE — 77067 SCR MAMMO BI INCL CAD: CPT | Mod: TC

## 2023-01-29 ENCOUNTER — OFFICE VISIT (OUTPATIENT)
Dept: URGENT CARE | Facility: CLINIC | Age: 43
End: 2023-01-29
Payer: COMMERCIAL

## 2023-01-29 VITALS
HEIGHT: 62 IN | WEIGHT: 187.19 LBS | OXYGEN SATURATION: 98 % | HEART RATE: 93 BPM | SYSTOLIC BLOOD PRESSURE: 110 MMHG | BODY MASS INDEX: 34.45 KG/M2 | DIASTOLIC BLOOD PRESSURE: 77 MMHG | TEMPERATURE: 99 F | RESPIRATION RATE: 18 BRPM

## 2023-01-29 DIAGNOSIS — Z20.822 EXPOSURE TO COVID-19 VIRUS: ICD-10-CM

## 2023-01-29 DIAGNOSIS — U07.1 COVID-19: Primary | ICD-10-CM

## 2023-01-29 LAB
CTP QC/QA: YES
SARS-COV-2 AG RESP QL IA.RAPID: POSITIVE

## 2023-01-29 PROCEDURE — 1159F MED LIST DOCD IN RCRD: CPT | Mod: CPTII,S$GLB,, | Performed by: NURSE PRACTITIONER

## 2023-01-29 PROCEDURE — 1160F RVW MEDS BY RX/DR IN RCRD: CPT | Mod: CPTII,S$GLB,, | Performed by: NURSE PRACTITIONER

## 2023-01-29 PROCEDURE — 3008F BODY MASS INDEX DOCD: CPT | Mod: CPTII,S$GLB,, | Performed by: NURSE PRACTITIONER

## 2023-01-29 PROCEDURE — 87811 SARS CORONAVIRUS 2 ANTIGEN POCT, MANUAL READ: ICD-10-PCS | Mod: QW,S$GLB,, | Performed by: NURSE PRACTITIONER

## 2023-01-29 PROCEDURE — 99214 PR OFFICE/OUTPT VISIT, EST, LEVL IV, 30-39 MIN: ICD-10-PCS | Mod: S$GLB,,, | Performed by: NURSE PRACTITIONER

## 2023-01-29 PROCEDURE — 3074F SYST BP LT 130 MM HG: CPT | Mod: CPTII,S$GLB,, | Performed by: NURSE PRACTITIONER

## 2023-01-29 PROCEDURE — 3008F PR BODY MASS INDEX (BMI) DOCUMENTED: ICD-10-PCS | Mod: CPTII,S$GLB,, | Performed by: NURSE PRACTITIONER

## 2023-01-29 PROCEDURE — 3078F DIAST BP <80 MM HG: CPT | Mod: CPTII,S$GLB,, | Performed by: NURSE PRACTITIONER

## 2023-01-29 PROCEDURE — 1159F PR MEDICATION LIST DOCUMENTED IN MEDICAL RECORD: ICD-10-PCS | Mod: CPTII,S$GLB,, | Performed by: NURSE PRACTITIONER

## 2023-01-29 PROCEDURE — 87811 SARS-COV-2 COVID19 W/OPTIC: CPT | Mod: QW,S$GLB,, | Performed by: NURSE PRACTITIONER

## 2023-01-29 PROCEDURE — 3078F PR MOST RECENT DIASTOLIC BLOOD PRESSURE < 80 MM HG: ICD-10-PCS | Mod: CPTII,S$GLB,, | Performed by: NURSE PRACTITIONER

## 2023-01-29 PROCEDURE — 99214 OFFICE O/P EST MOD 30 MIN: CPT | Mod: S$GLB,,, | Performed by: NURSE PRACTITIONER

## 2023-01-29 PROCEDURE — 1160F PR REVIEW ALL MEDS BY PRESCRIBER/CLIN PHARMACIST DOCUMENTED: ICD-10-PCS | Mod: CPTII,S$GLB,, | Performed by: NURSE PRACTITIONER

## 2023-01-29 PROCEDURE — 3074F PR MOST RECENT SYSTOLIC BLOOD PRESSURE < 130 MM HG: ICD-10-PCS | Mod: CPTII,S$GLB,, | Performed by: NURSE PRACTITIONER

## 2023-01-29 NOTE — PROGRESS NOTES
"Subjective:       Patient ID: Tato Godinez is a 42 y.o. female.    Vitals:  height is 5' 2" (1.575 m) and weight is 84.9 kg (187 lb 2.7 oz). Her oral temperature is 99.3 °F (37.4 °C). Her blood pressure is 110/77 and her pulse is 93. Her respiration is 18 and oxygen saturation is 98%.     Chief Complaint: Cough    This is a 42 y.o. female who presents today with a chief complaint of fever, sore throat and cough started 2 days ago, patient reports her daughter tested positive for COVID-19, patient reports she is vaccinated for COVID, denies body aches or chills, denies wheezing or shortness of breath, denies nausea, vomiting, diarrhea or abdominal pain, denies chest pain or dizziness positional lightheadedness, denies trouble swallowing, denies loss of taste or smell, or any other symptoms            Cough  This is a new problem. The current episode started yesterday. The problem has been gradually worsening. The problem occurs every few minutes. The cough is Productive of sputum. Associated symptoms include a fever, nasal congestion and a sore throat. Pertinent negatives include no ear congestion, ear pain, headaches or postnasal drip. Nothing aggravates the symptoms. Treatments tried: thera flu multi cold, ibuprofen. The treatment provided mild relief. There is no history of asthma, bronchitis or pneumonia.     Constitution: Positive for fever.   HENT:  Positive for sore throat. Negative for ear pain and postnasal drip.    Respiratory:  Positive for cough.    Neurological:  Negative for headaches.     Objective:      Physical Exam   Constitutional: She is oriented to person, place, and time. She appears well-developed. She is cooperative.  Non-toxic appearance. She does not appear ill. No distress.   HENT:   Head: Normocephalic and atraumatic.   Ears:   Right Ear: Hearing, tympanic membrane, external ear and ear canal normal.   Left Ear: Hearing, tympanic membrane, external ear and ear canal normal.   Nose: " Nose normal. No mucosal edema, rhinorrhea or nasal deformity. No epistaxis. Right sinus exhibits no maxillary sinus tenderness and no frontal sinus tenderness. Left sinus exhibits no maxillary sinus tenderness and no frontal sinus tenderness.   Mouth/Throat: Uvula is midline, oropharynx is clear and moist and mucous membranes are normal. No trismus in the jaw. Normal dentition. No uvula swelling. No oropharyngeal exudate, posterior oropharyngeal edema or posterior oropharyngeal erythema.   Eyes: Conjunctivae and lids are normal. No scleral icterus.   Neck: Trachea normal and phonation normal. Neck supple. No edema present. No erythema present. No neck rigidity present.   Cardiovascular: Normal rate, regular rhythm, normal heart sounds and normal pulses.   Pulmonary/Chest: Effort normal and breath sounds normal. No stridor. No respiratory distress. She has no decreased breath sounds. She has no wheezes. She has no rhonchi. She has no rales.   Abdominal: Normal appearance.   Musculoskeletal: Normal range of motion.         General: No deformity. Normal range of motion.   Neurological: She is alert and oriented to person, place, and time. She exhibits normal muscle tone. Coordination normal.   Skin: Skin is warm, dry, intact, not diaphoretic and not pale.   Psychiatric: Her speech is normal and behavior is normal. Judgment and thought content normal.   Nursing note and vitals reviewed.      Results for orders placed or performed in visit on 01/29/23   SARS Coronavirus 2 Antigen, POCT Manual Read   Result Value Ref Range    SARS Coronavirus 2 Antigen Positive (A) Negative     Acceptable Yes          Patient in no acute distress.  Vitals reassuring. Risk of complication 1   Discussed results/diagnosis/plan in depth with patient in clinic. Strict precautions given to patient to monitor for worsening signs and symptoms. Advised to follow up with primary.All questions answered. Strict ER precautions given. If  your symptoms worsens or fail to improve you should go to the Emergency Room. Discharge and follow-up instructions given verbally/printed. Discharge and follow-up instructions discussed with the patient who expressed understanding and willingness to comply with my recommendations.Patient voiced understanding and in agreement with current treatment plan.     Please be advised this text was dictated with [employer] software and may contain errors due to translation.    Assessment:       1. COVID-19    2. Exposure to COVID-19 virus          Plan:         COVID-19    Exposure to COVID-19 virus  -     SARS Coronavirus 2 Antigen, POCT Manual Read           Medical Decision Making:   Clinical Tests:   Lab Tests: Reviewed  Urgent Care Management:  Patient in no acute distress.  Vitals reassuring.  On exam, patient is nontoxic appearing and afebrile.  Positive COVID 19 results discussed with patient detail.  Detailed education provided about COVID 19 precautions and recommendations as per CDC guidelines.  Lungs CTA.  Risk of complication score 1.  Paxlovid discussed with patient, patient declined at this time.  Proper hydration advised.  I reiterated the importance of further evaluation if no improvement symptoms. Patient voiced understanding and in agreement with current treatment plan.         Patient Instructions   PLEASE READ YOUR DISCHARGE INSTRUCTIONS ENTIRELY AS IT CONTAINS IMPORTANT INFORMATION.      Please drink plenty of fluids.    Please get plenty of rest.    Please return here or go to the Emergency Department for any concerns or worsening of condition.    Please take an over the counter antihistamine medication (allegra/Claritin/Zyrtec) of your choice as directed.    Try an over the counter decongestant like Mucinex D or Sudafed. You buy this behind the pharmacy counter    If you do have Hypertension or palpitations, it is safe to take Coricidin HBP for relief of sinus symptoms.    If not allergic, please take  over the counter Tylenol (Acetaminophen) and/or Motrin (Ibuprofen) as directed for control of pain and/or fever.  Please follow up with your primary care doctor or specialist as needed.    Sore throat recommendations: Warm fluids, warm salt water gargles, throat lozenges, tea, honey, soup, rest, hydration.    Use over the counter flonase: one spray each nostril twice daily OR two sprays each nostril once daily.     If you  smoke, please stop smoking.      Please return or see your primary care doctor if you develop new or worsening symptoms.     Please arrange follow up with your primary medical clinic as soon as possible. You must understand that you've received an Urgent Care treatment only and that you may be released before all of your medical problems are known or treated. You, the patient, will arrange for follow up as instructed. If your symptoms worsen or fail to improve you should go to the Emergency Room.

## 2023-01-29 NOTE — LETTER
[address]  [place] [address]  Phone: [phone]  Fax: [phone]          Return to Work/School    Patient: Tato Godinez  YOB: 1980   Date: 01/29/2023     To Whom It May Concern:     Tato Godinez was in contact with/seen in my office on 01/29/2023. COVID-19 is present in our communities across the [facility]. There is limited testing for COVID at this time, so not all patients can be tested. In this situation, your employee meets the following criteria:     Tato Godinez has met the criteria for COVID-19 testing and has a POSITIVE result. She can return to work once they are asymptomatic for 24 hours without the use of fever reducing medications AND at least five days from the start of symptoms (or from the first positive result if they have no symptoms).      If you have any questions or concerns, or if I can be of further assistance, please do not hesitate to contact me.     Sincerely,      Sally Wheeler NP

## 2023-03-08 ENCOUNTER — OFFICE VISIT (OUTPATIENT)
Dept: PLASTIC SURGERY | Facility: CLINIC | Age: 43
End: 2023-03-08
Payer: COMMERCIAL

## 2023-03-08 VITALS
WEIGHT: 187 LBS | BODY MASS INDEX: 34.41 KG/M2 | HEIGHT: 62 IN | SYSTOLIC BLOOD PRESSURE: 120 MMHG | HEART RATE: 75 BPM | OXYGEN SATURATION: 98 % | DIASTOLIC BLOOD PRESSURE: 76 MMHG

## 2023-03-08 DIAGNOSIS — R21 EXCORIATED RASH: ICD-10-CM

## 2023-03-08 DIAGNOSIS — N62 MACROMASTIA: Primary | ICD-10-CM

## 2023-03-08 DIAGNOSIS — G89.29 CHRONIC UPPER BACK PAIN: ICD-10-CM

## 2023-03-08 DIAGNOSIS — M54.9 CHRONIC UPPER BACK PAIN: ICD-10-CM

## 2023-03-08 DIAGNOSIS — G89.29 CHRONIC NECK PAIN: ICD-10-CM

## 2023-03-08 DIAGNOSIS — M54.2 CHRONIC NECK PAIN: ICD-10-CM

## 2023-03-08 PROCEDURE — 3074F SYST BP LT 130 MM HG: CPT | Mod: CPTII,S$GLB,, | Performed by: SURGERY

## 2023-03-08 PROCEDURE — 1160F RVW MEDS BY RX/DR IN RCRD: CPT | Mod: CPTII,S$GLB,, | Performed by: SURGERY

## 2023-03-08 PROCEDURE — 3008F BODY MASS INDEX DOCD: CPT | Mod: CPTII,S$GLB,, | Performed by: SURGERY

## 2023-03-08 PROCEDURE — 3078F PR MOST RECENT DIASTOLIC BLOOD PRESSURE < 80 MM HG: ICD-10-PCS | Mod: CPTII,S$GLB,, | Performed by: SURGERY

## 2023-03-08 PROCEDURE — 3074F PR MOST RECENT SYSTOLIC BLOOD PRESSURE < 130 MM HG: ICD-10-PCS | Mod: CPTII,S$GLB,, | Performed by: SURGERY

## 2023-03-08 PROCEDURE — 99999 PR PBB SHADOW E&M-EST. PATIENT-LVL III: CPT | Mod: PBBFAC,,, | Performed by: SURGERY

## 2023-03-08 PROCEDURE — 99203 PR OFFICE/OUTPT VISIT, NEW, LEVL III, 30-44 MIN: ICD-10-PCS | Mod: S$GLB,,, | Performed by: SURGERY

## 2023-03-08 PROCEDURE — 3078F DIAST BP <80 MM HG: CPT | Mod: CPTII,S$GLB,, | Performed by: SURGERY

## 2023-03-08 PROCEDURE — 99999 PR PBB SHADOW E&M-EST. PATIENT-LVL III: ICD-10-PCS | Mod: PBBFAC,,, | Performed by: SURGERY

## 2023-03-08 PROCEDURE — 99203 OFFICE O/P NEW LOW 30 MIN: CPT | Mod: S$GLB,,, | Performed by: SURGERY

## 2023-03-08 PROCEDURE — 1159F PR MEDICATION LIST DOCUMENTED IN MEDICAL RECORD: ICD-10-PCS | Mod: CPTII,S$GLB,, | Performed by: SURGERY

## 2023-03-08 PROCEDURE — 3008F PR BODY MASS INDEX (BMI) DOCUMENTED: ICD-10-PCS | Mod: CPTII,S$GLB,, | Performed by: SURGERY

## 2023-03-08 PROCEDURE — 1160F PR REVIEW ALL MEDS BY PRESCRIBER/CLIN PHARMACIST DOCUMENTED: ICD-10-PCS | Mod: CPTII,S$GLB,, | Performed by: SURGERY

## 2023-03-08 PROCEDURE — 1159F MED LIST DOCD IN RCRD: CPT | Mod: CPTII,S$GLB,, | Performed by: SURGERY

## 2023-03-08 NOTE — PROGRESS NOTES
History & Physical    SUBJECTIVE:   Chief complaint: large breasts    History of Present Illness:    Tato Godinez presents to [facility] on 3/8/2023 for evaluation for bilateral breast reduction secondary to symptomatic bilateral large pendulous breast. She has a chief complaint of chronic neck and back pain for >10 years. She has tried NSAIDs, Tylenol, PT, chiropractics, narcotics, muscle relaxants without alleviation of pain. She also complains of deep shoulder grooving from her bra straps as well as macerating rashes below each breast that have not significantly improved with application of OTC medicated ointments and creams.  She currently reports a bra size of 38I but mostly wears sports bras and doesn't know her true bra size. She is employed as an RN at a local school. She denies smoking tobacco or the use of any nicotine containing products.       No past medical history on file.    Past Surgical History:   Procedure Laterality Date     SECTION      HAND SURGERY         Family History   Problem Relation Age of Onset    Diabetes Maternal Grandmother     Cancer Maternal Grandmother     Colon cancer Maternal Uncle     Breast cancer Neg Hx     Ovarian cancer Neg Hx        Social History     Socioeconomic History    Marital status:    Tobacco Use    Smoking status: Never    Smokeless tobacco: Never   Substance and Sexual Activity    Alcohol use: Yes     Comment: socially    Drug use: No    Sexual activity: Yes     Partners: Male     Birth control/protection: Condom       Current Outpatient Medications   Medication Sig Dispense Refill    albuterol (VENTOLIN HFA) 90 mcg/actuation inhaler Inhale 2 puffs into the lungs every 6 (six) hours as needed for Wheezing. Rescue (Patient not taking: Reported on 2022) 18 g 0    fluconazole (DIFLUCAN) 150 MG Tab Take by mouth.       No current facility-administered medications for this visit.       Review of patient's  allergies indicates:  No Known Allergies      Review of Systems:    Review of Systems   HENT: Positive for neck pain.    Musculoskeletal: Positive for back pain. Positive for Chest Pain  Abd: Positive for rashes  Neurological: Negative for headaches or dizziness      OBJECTIVE:     There were no vitals taken for this visit.      Physical Exam:    Physical Exam   Constitutional: She is oriented to person, place, and time. She appears well-developed and well-nourished.   Neck: Normal range of motion. Neck supple. No tracheal deviation present.   Cardiovascular: Normal rate, regular rhythm and normal heart sounds.    Pulmonary/Chest: Effort normal and breath sounds normal. bilaterally enlarged breasts, evidence of previous rashes, shoulder grooving, no palpable masses, nipple everted  Abdominal: Soft. Bowel sounds are normal.   Musculoskeletal: Normal range of motion.   Neurological: She is alert and oriented to person, place, and time.   Skin: Skin is warm.       Last MMG 11/21/22    ASSESSMENT/PLAN:     1.Symptomatic Bilateral Macromastia  2. Chronic neck pain  3. Chronic back pain  4. Chronic breast rashes    PLAN:Plan    -Will need 1000 gm reduction per side  -Will submit paper work for insurance approval  -Photos obtained  -Risk, benefits, and alternatives explained. She understands that the risks include but are not limited to bleeding, scarring, infection, pain, numbness, asymmetry, deformity, open wound, skin necrosis, wound dehiscence, permanent or temporary loss of sensation to the nipple, partial or total nipple loss requiring removal, poor cosmetic outcome, hematoma, seroma and pulmonary emobolus.   - Patient would like to proceed with scheduling bilateral breast reduction pending insurance authorization  - Will plan for procedure post May (has summer off)    All questions were answered. The patient was advised to call the clinic with any questions or concerns prior to their next visit.

## 2023-03-08 NOTE — LETTER
[facility] 2nd Floor  [address] [address]  Phone: [phone]  Fax: [phone] March 14, 2023        Davis Lorenzo MD  [address] [place] [id number]    Patient: Tato Godinez   MR Number: [id number]   YOB: 1980   Date of Visit: 3/8/2023     Dear Dr. Lorenzo:    Thank you for referring Tato Godinez to me for evaluation. Attached are the relevant portions of my assessment and plan of care.    If you have questions, please do not hesitate to call me. I look forward to following Tato KITCHEN along with you.    Sincerely,       Sukhjinder Iraheta MD   Section of Plastic Surgery  Department of Surgery  [facility]    CRB/hcr

## 2023-06-01 ENCOUNTER — OFFICE VISIT (OUTPATIENT)
Dept: SPORTS MEDICINE | Facility: CLINIC | Age: 43
End: 2023-06-01
Payer: COMMERCIAL

## 2023-06-01 ENCOUNTER — HOSPITAL ENCOUNTER (OUTPATIENT)
Dept: RADIOLOGY | Facility: HOSPITAL | Age: 43
Discharge: HOME | End: 2023-06-01
Attending: PHYSICIAN ASSISTANT
Payer: COMMERCIAL

## 2023-06-01 VITALS
HEIGHT: 62 IN | HEART RATE: 77 BPM | SYSTOLIC BLOOD PRESSURE: 133 MMHG | BODY MASS INDEX: 33.13 KG/M2 | DIASTOLIC BLOOD PRESSURE: 65 MMHG | WEIGHT: 180 LBS

## 2023-06-01 DIAGNOSIS — M25.561 ACUTE PAIN OF RIGHT KNEE: Primary | ICD-10-CM

## 2023-06-01 DIAGNOSIS — S83.411A SPRAIN OF MEDIAL COLLATERAL LIGAMENT OF RIGHT KNEE, INITIAL ENCOUNTER: ICD-10-CM

## 2023-06-01 DIAGNOSIS — M25.561 ACUTE PAIN OF RIGHT KNEE: ICD-10-CM

## 2023-06-01 PROCEDURE — 3078F DIAST BP <80 MM HG: CPT | Mod: CPTII,S$GLB,, | Performed by: PHYSICIAN ASSISTANT

## 2023-06-01 PROCEDURE — 97760 PR ORTHOTIC MGMT&TRAINJ INITIAL ENC EA 15 MINS: ICD-10-PCS | Mod: GP,S$GLB,, | Performed by: PHYSICIAN ASSISTANT

## 2023-06-01 PROCEDURE — 3075F SYST BP GE 130 - 139MM HG: CPT | Mod: CPTII,S$GLB,, | Performed by: PHYSICIAN ASSISTANT

## 2023-06-01 PROCEDURE — 3078F PR MOST RECENT DIASTOLIC BLOOD PRESSURE < 80 MM HG: ICD-10-PCS | Mod: CPTII,S$GLB,, | Performed by: PHYSICIAN ASSISTANT

## 2023-06-01 PROCEDURE — 3075F PR MOST RECENT SYSTOLIC BLOOD PRESS GE 130-139MM HG: ICD-10-PCS | Mod: CPTII,S$GLB,, | Performed by: PHYSICIAN ASSISTANT

## 2023-06-01 PROCEDURE — 99999 PR PBB SHADOW E&M-EST. PATIENT-LVL III: CPT | Mod: PBBFAC,,, | Performed by: PHYSICIAN ASSISTANT

## 2023-06-01 PROCEDURE — 97760 ORTHOTIC MGMT&TRAING 1ST ENC: CPT | Mod: GP,S$GLB,, | Performed by: PHYSICIAN ASSISTANT

## 2023-06-01 PROCEDURE — 1159F PR MEDICATION LIST DOCUMENTED IN MEDICAL RECORD: ICD-10-PCS | Mod: CPTII,S$GLB,, | Performed by: PHYSICIAN ASSISTANT

## 2023-06-01 PROCEDURE — 99204 PR OFFICE/OUTPT VISIT, NEW, LEVL IV, 45-59 MIN: ICD-10-PCS | Mod: S$GLB,,, | Performed by: PHYSICIAN ASSISTANT

## 2023-06-01 PROCEDURE — 3008F PR BODY MASS INDEX (BMI) DOCUMENTED: ICD-10-PCS | Mod: CPTII,S$GLB,, | Performed by: PHYSICIAN ASSISTANT

## 2023-06-01 PROCEDURE — 73564 X-RAY EXAM KNEE 4 OR MORE: CPT | Mod: TC,50

## 2023-06-01 PROCEDURE — 99204 OFFICE O/P NEW MOD 45 MIN: CPT | Mod: S$GLB,,, | Performed by: PHYSICIAN ASSISTANT

## 2023-06-01 PROCEDURE — 1159F MED LIST DOCD IN RCRD: CPT | Mod: CPTII,S$GLB,, | Performed by: PHYSICIAN ASSISTANT

## 2023-06-01 PROCEDURE — 99999 PR PBB SHADOW E&M-EST. PATIENT-LVL III: ICD-10-PCS | Mod: PBBFAC,,, | Performed by: PHYSICIAN ASSISTANT

## 2023-06-01 PROCEDURE — 3008F BODY MASS INDEX DOCD: CPT | Mod: CPTII,S$GLB,, | Performed by: PHYSICIAN ASSISTANT

## 2023-06-01 PROCEDURE — 73564 XR KNEE ORTHO BILAT WITH FLEXION: ICD-10-PCS | Mod: 26,,, | Performed by: RADIOLOGY

## 2023-06-01 PROCEDURE — 73564 X-RAY EXAM KNEE 4 OR MORE: CPT | Mod: 26,,, | Performed by: RADIOLOGY

## 2023-06-01 RX ORDER — IBUPROFEN 800 MG/1
800 TABLET ORAL 3 TIMES DAILY
COMMUNITY
End: 2023-06-06 | Stop reason: SDUPTHER

## 2023-06-01 NOTE — PROGRESS NOTES
CC: Right knee pain    Patient is a 42-year-old female who presents today for initial evaluation of right knee pain.   Patient reports that she injured her right knee on Tuesday while at a party.  She was standing at the end of a water slide when a child came down the slide and hit the side of her right knee applying a significant amount of valgus force.  She reports feeling sharp, intense pain at the time of the injury and fell to the ground.  Since then, she is had significant difficulty bearing weight and has been ambulating with the assistance of crutches and a knee brace.  She localizes the pain to the medial aspect of the knee without radiation.  This is worse with knee flexion, bending the knee, and when pivoting to the side.  She does not recall hearing or feeling a pop immediately at the time of injury.  She has been experiencing some soreness and swelling over the medial aspect of the knee since this occurred.  The knee feels subjectively unstable. No prior injuries or surgeries on the right knee.   She has been taking ibuprofen 800 mg, icing, and elevating the right knee with only slight relief here today to discuss diagnosis and treatment options moving forward.    - mechanical symptoms, + instability    Is affecting ADLs.  Pain is 7/10 at it's worst.    Vocation:  School nurse    REVIEW OF SYSTEMS:   Constitution: Negative. Negative for chills, fever and night sweats.   HENT: Negative for congestion and headaches.    Eyes: Negative for blurred vision, left vision loss and right vision loss.   Cardiovascular: Negative for chest pain and syncope.   Respiratory: Negative for cough and shortness of breath.    Endocrine: Negative for polydipsia, polyphagia and polyuria.   Hematologic/Lymphatic: Negative for bleeding problem. Does not bruise/bleed easily.   Skin: Negative for dry skin, itching and rash.   Musculoskeletal: Negative for falls. Positive for right knee pain and  muscle weakness.  "  Gastrointestinal: Negative for abdominal pain and bowel incontinence.   Genitourinary: Negative for bladder incontinence and nocturia.   Neurological: Negative for disturbances in coordination, loss of balance and seizures.   Psychiatric/Behavioral: Negative for depression. The patient does not have insomnia.    Allergic/Immunologic: Negative for hives and persistent infections.     PAST MEDICAL HISTORY:   History reviewed. No pertinent past medical history.    PAST SURGICAL HISTORY:   Past Surgical History:   Procedure Laterality Date     SECTION      HAND SURGERY         FAMILY HISTORY:   Family History   Problem Relation Age of Onset    Diabetes Maternal Grandmother     Cancer Maternal Grandmother     Colon cancer Maternal Uncle     Breast cancer Neg Hx     Ovarian cancer Neg Hx        SOCIAL HISTORY:   Social History     Socioeconomic History    Marital status:    Tobacco Use    Smoking status: Never    Smokeless tobacco: Never   Substance and Sexual Activity    Alcohol use: Yes     Comment: socially    Drug use: No    Sexual activity: Yes     Partners: Male     Birth control/protection: Condom       MEDICATIONS:     Current Outpatient Medications:     ibuprofen (ADVIL,MOTRIN) 800 MG tablet, Take 800 mg by mouth 3 (three) times daily., Disp: , Rfl:     albuterol (VENTOLIN HFA) 90 mcg/actuation inhaler, Inhale 2 puffs into the lungs every 6 (six) hours as needed for Wheezing. Rescue (Patient not taking: Reported on 2022), Disp: 18 g, Rfl: 0    fluconazole (DIFLUCAN) 150 MG Tab, Take by mouth., Disp: , Rfl:     ALLERGIES:   Review of patient's allergies indicates:  No Known Allergies    VITAL SIGNS:   /65   Pulse 77   Ht 5' 2" (1.575 m)   Wt 81.6 kg (180 lb)   BMI 32.92 kg/m²      PHYSICAL EXAMINATION:  General:  The patient is alert and oriented x 3.  Mood is pleasant.  Observation of ears, eyes and nose reveal no gross abnormalities.  No labored breathing observed.    RIGHT KNEE " EXAMINATION     OBSERVATION / INSPECTION   Gait:   antalgic with crutches and brace  Alignment:  Neutral   Scars:   None   Muscle atrophy: Mild  Effusion:  None   Warmth:  None   Discoloration:   none   Swelling:  Mild soft tissue swelling over medial joint line/MCL    TENDERNESS / CREPITUS (T / C):          T / C      T / C   Patella   - / -   Lateral joint line   - / -   Peripatellar medial  -  Medial joint line    + / -   Peripatellar lateral -  Medial plica   - / -   Patellar tendon -   Popliteal fossa   - / -   Quad tendon   -   Gastrocnemius   -   Prepatellar Bursa - / -   Quadricep   -   Tibial tubercle  -  Thigh/hamstring  -   Pes anserine/HS -  Fibula    -   ITB   - / -  Tibia     -   Tib/fib joint  - / -  LCL    -     MFC   - / -   MCL: Proximal  +    LFC   - / -    Distal   +          ROM: (* = pain)  PASSIVE   ACTIVE    Left :   5 / 0 / 145   5 / 0 / 145     Right :    5  0 / 95*   5 / 0 / 90*    Patellofemoral examination:  See above noted areas of tenderness.   Patella position    Subluxation / dislocation: Centered           Sup. / Inf;   Normal   Crepitus (PF):    Absent   Patellar Mobility:       Medial-lateral:   Normal    Superior-inferior:  Normal    Inferior tilt   Normal    Patellar tendon:  Normal   Lateral tilt:    Normal   J-sign:     None   Patellofemoral grind:   No pain       MENISCAL SIGNS:     Pain on terminal extension:  -  Pain on terminal flexion:  +  Ramóns maneuver:  + (for pain)  Squat     deferred    LIGAMENT EXAMINATION:  ACL / Lachman:  normal (-1 to 2mm)    PCL-Post.  drawer: normal 0 to 2mm  MCL- Valgus:  Grade 2-3  LCL- Varus:  normal 0 to 2mm  Pivot shift: normal (Equal)   Dial Test: difference c/w other side   At 30° flexion: normal (< 5°)    At 90° flexion: normal (< 5°)   Reverse Pivot Shift:   normal (Equal)     STRENGTH: (* = with pain) PAINFUL SIDE   Quadricep   4/5   Hamstrin/5    EXTREMITY NEURO-VASCULAR EXAMINATION:   Sensation:  Grossly intact to  light touch all dermatomal regions.   Motor Function:  Fully intact motor function at hip, knee, foot and ankle    DTRs;  quadriceps and  achilles 2+.  No clonus and downgoing Babinski.    Vascular status:  DP and PT pulses 2+, brisk capillary refill, symmetric.     OTHER FINDINGS:  N/A    X-rays bilateral knees (6/1/2023):  FINDINGS:  Joint spaces are maintained bony structures are intact.  No joint effusion is seen on either side.  Tibial tubercle is prominent bilaterally.        ASSESSMENT:    Right Knee Pain   MCL sprain, possible tear    PLAN:     I made the decision to obtain old records of the patient including previous notes and imaging. New imaging was ordered today of the extremity or extremities evaluated. I independently reviewed and interpreted the radiographs and/or MRIs today as well as prior imaging, if available.    Orders placed for MRI without contrast of right knee to evaluate for possible MCL tear and other internal derangement,    [id number] [place] [facility] [place] / brace adjustment, fitting and training with the patient. The patient demonstrated understanding and proper care. This was performed for 10 minutes. Fit for crutches and a short runner knee brace.    Recommend she continue to rest, ice, and elevate the right knee and take over-the-counter anti-inflammatories / acetaminophen as needed for pain.  Recommend she weight bear as tolerated with crutches and knee brace in place.    Follow up in clinic with MRI results.      All questions were answered, pt will contact us for questions or concerns in the interim.      Medical Dictation software was used during the dictation of portions or the entirety of this medical record.  Phonetic or grammatic errors may exist due to the use of this software. For clarification, refer to the author of the document.

## 2023-06-05 ENCOUNTER — HOSPITAL ENCOUNTER (OUTPATIENT)
Dept: RADIOLOGY | Facility: HOSPITAL | Age: 43
Discharge: HOME | End: 2023-06-05
Attending: PHYSICIAN ASSISTANT
Payer: COMMERCIAL

## 2023-06-05 DIAGNOSIS — S83.411A SPRAIN OF MEDIAL COLLATERAL LIGAMENT OF RIGHT KNEE, INITIAL ENCOUNTER: ICD-10-CM

## 2023-06-05 DIAGNOSIS — M25.561 ACUTE PAIN OF RIGHT KNEE: ICD-10-CM

## 2023-06-05 PROCEDURE — 73721 MRI JNT OF LWR EXTRE W/O DYE: CPT | Mod: 26,RT,, | Performed by: INTERNAL MEDICINE

## 2023-06-05 PROCEDURE — 73721 MRI KNEE WITHOUT CONTRAST RIGHT: ICD-10-PCS | Mod: 26,RT,, | Performed by: INTERNAL MEDICINE

## 2023-06-05 PROCEDURE — 73721 MRI JNT OF LWR EXTRE W/O DYE: CPT | Mod: TC,RT

## 2023-06-06 ENCOUNTER — OFFICE VISIT (OUTPATIENT)
Dept: SPORTS MEDICINE | Facility: CLINIC | Age: 43
End: 2023-06-06
Payer: COMMERCIAL

## 2023-06-06 VITALS
HEART RATE: 69 BPM | WEIGHT: 183.88 LBS | BODY MASS INDEX: 33.84 KG/M2 | DIASTOLIC BLOOD PRESSURE: 63 MMHG | SYSTOLIC BLOOD PRESSURE: 104 MMHG | HEIGHT: 62 IN

## 2023-06-06 DIAGNOSIS — M25.561 ACUTE PAIN OF RIGHT KNEE: Primary | ICD-10-CM

## 2023-06-06 DIAGNOSIS — S83.411A COMPLETE TEAR OF MEDIAL COLLATERAL LIGAMENT OF RIGHT KNEE, INITIAL ENCOUNTER: ICD-10-CM

## 2023-06-06 PROCEDURE — 3074F PR MOST RECENT SYSTOLIC BLOOD PRESSURE < 130 MM HG: ICD-10-PCS | Mod: CPTII,S$GLB,, | Performed by: PHYSICIAN ASSISTANT

## 2023-06-06 PROCEDURE — 3008F BODY MASS INDEX DOCD: CPT | Mod: CPTII,S$GLB,, | Performed by: PHYSICIAN ASSISTANT

## 2023-06-06 PROCEDURE — 3078F DIAST BP <80 MM HG: CPT | Mod: CPTII,S$GLB,, | Performed by: PHYSICIAN ASSISTANT

## 2023-06-06 PROCEDURE — 1159F PR MEDICATION LIST DOCUMENTED IN MEDICAL RECORD: ICD-10-PCS | Mod: CPTII,S$GLB,, | Performed by: PHYSICIAN ASSISTANT

## 2023-06-06 PROCEDURE — 1159F MED LIST DOCD IN RCRD: CPT | Mod: CPTII,S$GLB,, | Performed by: PHYSICIAN ASSISTANT

## 2023-06-06 PROCEDURE — 3008F PR BODY MASS INDEX (BMI) DOCUMENTED: ICD-10-PCS | Mod: CPTII,S$GLB,, | Performed by: PHYSICIAN ASSISTANT

## 2023-06-06 PROCEDURE — 99999 PR PBB SHADOW E&M-EST. PATIENT-LVL III: ICD-10-PCS | Mod: PBBFAC,,, | Performed by: PHYSICIAN ASSISTANT

## 2023-06-06 PROCEDURE — 3074F SYST BP LT 130 MM HG: CPT | Mod: CPTII,S$GLB,, | Performed by: PHYSICIAN ASSISTANT

## 2023-06-06 PROCEDURE — 3078F PR MOST RECENT DIASTOLIC BLOOD PRESSURE < 80 MM HG: ICD-10-PCS | Mod: CPTII,S$GLB,, | Performed by: PHYSICIAN ASSISTANT

## 2023-06-06 PROCEDURE — 99999 PR PBB SHADOW E&M-EST. PATIENT-LVL III: CPT | Mod: PBBFAC,,, | Performed by: PHYSICIAN ASSISTANT

## 2023-06-06 PROCEDURE — 99214 PR OFFICE/OUTPT VISIT, EST, LEVL IV, 30-39 MIN: ICD-10-PCS | Mod: S$GLB,,, | Performed by: PHYSICIAN ASSISTANT

## 2023-06-06 PROCEDURE — 99214 OFFICE O/P EST MOD 30 MIN: CPT | Mod: S$GLB,,, | Performed by: PHYSICIAN ASSISTANT

## 2023-06-06 RX ORDER — IBUPROFEN 800 MG/1
800 TABLET ORAL 3 TIMES DAILY PRN
Qty: 60 TABLET | Refills: 1 | Status: SHIPPED | OUTPATIENT
Start: 2023-06-06

## 2023-06-06 NOTE — PROGRESS NOTES
CC: Right knee pain    Patient presents today for follow-up evaluation of right knee pain. Symptoms remain relatively unchanged since her last visit.  She has been wearing a knee brace and ambulating with the assistance of crutches as needed.  No new injuries since her last visit.  She is here today to discuss MRI results and treatment options moving forward.    HPI (6/1/2023):  Patient is a 42-year-old female who presents today for initial evaluation of right knee pain.   Patient reports that she injured her right knee on Tuesday while at a party.  She was standing at the end of a water slide when a child came down the slide and hit the side of her right knee applying a significant amount of valgus force.  She reports feeling sharp, intense pain at the time of the injury and fell to the ground.  Since then, she is had significant difficulty bearing weight and has been ambulating with the assistance of crutches and a knee brace.  She localizes the pain to the medial aspect of the knee without radiation.  This is worse with knee flexion, bending the knee, and when pivoting to the side.  She does not recall hearing or feeling a pop immediately at the time of injury.  She has been experiencing some soreness and swelling over the medial aspect of the knee since this occurred.  The knee feels subjectively unstable. No prior injuries or surgeries on the right knee.   She has been taking ibuprofen 800 mg, icing, and elevating the right knee with only slight relief here today to discuss diagnosis and treatment options moving forward.    - mechanical symptoms, + instability    Is affecting ADLs.  Pain is 7/10 at it's worst.    Vocation:  School nurse    REVIEW OF SYSTEMS:   Constitution: Negative. Negative for chills, fever and night sweats.   HENT: Negative for congestion and headaches.    Eyes: Negative for blurred vision, left vision loss and right vision loss.   Cardiovascular: Negative for chest pain and syncope.    Respiratory: Negative for cough and shortness of breath.    Endocrine: Negative for polydipsia, polyphagia and polyuria.   Hematologic/Lymphatic: Negative for bleeding problem. Does not bruise/bleed easily.   Skin: Negative for dry skin, itching and rash.   Musculoskeletal: Negative for falls. Positive for right knee pain and  muscle weakness.   Gastrointestinal: Negative for abdominal pain and bowel incontinence.   Genitourinary: Negative for bladder incontinence and nocturia.   Neurological: Negative for disturbances in coordination, loss of balance and seizures.   Psychiatric/Behavioral: Negative for depression. The patient does not have insomnia.    Allergic/Immunologic: Negative for hives and persistent infections.     PAST MEDICAL HISTORY:   History reviewed. No pertinent past medical history.    PAST SURGICAL HISTORY:   Past Surgical History:   Procedure Laterality Date     SECTION      HAND SURGERY         FAMILY HISTORY:   Family History   Problem Relation Age of Onset    Diabetes Maternal Grandmother     Cancer Maternal Grandmother     Colon cancer Maternal Uncle     Breast cancer Neg Hx     Ovarian cancer Neg Hx        SOCIAL HISTORY:   Social History     Socioeconomic History    Marital status:    Tobacco Use    Smoking status: Never    Smokeless tobacco: Never   Substance and Sexual Activity    Alcohol use: Yes     Comment: socially    Drug use: No    Sexual activity: Yes     Partners: Male     Birth control/protection: Condom       MEDICATIONS:     Current Outpatient Medications:     albuterol (VENTOLIN HFA) 90 mcg/actuation inhaler, Inhale 2 puffs into the lungs every 6 (six) hours as needed for Wheezing. Rescue (Patient not taking: Reported on 2022), Disp: 18 g, Rfl: 0    fluconazole (DIFLUCAN) 150 MG Tab, Take by mouth., Disp: , Rfl:     ibuprofen (ADVIL,MOTRIN) 800 MG tablet, Take 1 tablet (800 mg total) by mouth 3 (three) times daily as needed for Pain., Disp: 60 tablet,  "Rfl: 1    ALLERGIES:   Review of patient's allergies indicates:  No Known Allergies    VITAL SIGNS:   /63   Pulse 69   Ht 5' 2" (1.575 m)   Wt 83.4 kg (183 lb 14.4 oz)   BMI 33.64 kg/m²      PHYSICAL EXAMINATION:  General:  The patient is alert and oriented x 3.  Mood is pleasant.  Observation of ears, eyes and nose reveal no gross abnormalities.  No labored breathing observed.    RIGHT KNEE EXAMINATION     OBSERVATION / INSPECTION   Gait:   antalgic with crutches and long runner knee brace  Alignment:  Neutral   Scars:   None   Muscle atrophy: Mild  Effusion:  None   Warmth:  None   Discoloration:   Ecchymosis present over medial knee  Swelling:  Mild soft tissue swelling over medial joint line/MCL    TENDERNESS / CREPITUS (T / C):          T / C      T / C   Patella   - / -   Lateral joint line   - / -   Peripatellar medial  -  Medial joint line    + / -   Peripatellar lateral -  Medial plica   - / -   Patellar tendon -   Popliteal fossa   - / -   Quad tendon   -   Gastrocnemius   -   Prepatellar Bursa - / -   Quadricep   -   Tibial tubercle  -  Thigh/hamstring  -   Pes anserine/HS -  Fibula    -   ITB   - / -  Tibia     -   Tib/fib joint  - / -  LCL    -     MFC   - / -   MCL: Proximal  +    LFC   - / -    Distal   +          ROM: (* = pain)  PASSIVE   ACTIVE    Left :   5 / 0 / 145   5 / 0 / 145     Right :    5 / 0 / 95*   5 / 0 / 90*    Patellofemoral examination:  See above noted areas of tenderness.   Patella position    Subluxation / dislocation: Centered           Sup. / Inf;   Normal   Crepitus (PF):    Absent   Patellar Mobility:       Medial-lateral:   Normal    Superior-inferior:  Normal    Inferior tilt   Normal    Patellar tendon:  Normal   Lateral tilt:    Normal   J-sign:     None   Patellofemoral grind:   No pain       MENISCAL SIGNS:     Pain on terminal extension:  -  Pain on terminal flexion:  +  Ramóns maneuver:  + (for pain)  Squat     deferred    LIGAMENT EXAMINATION:  ACL / " Lachman:  normal (-1 to 2mm)    PCL-Post.  drawer: normal 0 to 2mm  MCL- Valgus:  Grade 2-3  LCL- Varus:  normal 0 to 2mm  Pivot shift: normal (Equal)   Dial Test: difference c/w other side   At 30° flexion: normal (< 5°)    At 90° flexion: normal (< 5°)   Reverse Pivot Shift:   normal (Equal)     STRENGTH: (* = with pain) PAINFUL SIDE   Quadricep   4/5   Hamstrin/5    EXTREMITY NEURO-VASCULAR EXAMINATION:   Sensation:  Grossly intact to light touch all dermatomal regions.   Motor Function:  Fully intact motor function at hip, knee, foot and ankle    DTRs;  quadriceps and  achilles 2+.  No clonus and downgoing Babinski.    Vascular status:  DP and PT pulses 2+, brisk capillary refill, symmetric.     OTHER FINDINGS:  N/A    X-rays bilateral knees (2023):  FINDINGS:  Joint spaces are maintained bony structures are intact.  No joint effusion is seen on either side.  Tibial tubercle is prominent bilaterally.     MRI right knee (2023):  FINDINGS:  Medial compartment: No meniscal tear.  No cartilage defect or subchondral bone marrow edema.     Lateral compartment: No meniscal tear. No cartilage defect or subchondral bone marrow edema.     Patellofemoral compartment: No cartilage defect or subchondral bone marrow edema.     Tendons: Quadriceps, popliteus, and biceps femoris tendons are intact.  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     Ligaments: Full-thickness tear of MCL just distal to the femoral attachment.  Soft tissue edema extends along the posterior oblique ligament and medial infrapatellar retinaculum.  The ACL, PCL, and LCL are intact.     Bone: No acute fracture, osteonecrosis, or focal lesion.     Joint: Small joint effusion.  No Baker's cyst.  There is subcutaneous edema in the anterior knee.  No bursal collection.     Impression:     Grade 3 MCL sprain.     ASSESSMENT:    Right Knee Pain   Grade 3 MCL injury    PLAN:      I made the decision to obtain old records of the patient including previous notes and imaging. I independently reviewed and interpreted the radiographs and/or MRIs today as well as prior imaging. Reviewed MRI and radiograph results with patient in detail.    We discussed at length different treatment options including conservative vs surgical management. These include anti-inflammatories, acetaminophen, rest, ice, heat, formal physical therapy including strengthening and stretching exercises, home exercise programs, dry needling, brace wear, and finally surgical intervention.      I will discuss MRI findings with Dr. Early and call her back to discuss plan moving forward.  Patient is agreeable to surgery or nonoperative treatment including physical therapy.    Recommend she continue to rest, ice, and elevate the right knee and weight bear as tolerated with crutches and knee brace in place.    Refill of Motrin 800 mg 1 p.o. t.i.d. p.r.n. pain provided today.  Advised to refrain from taking other anti-inflammatory medications while taking Motrin, however may alternate with acetaminophen if needed.      All questions were answered, pt will contact us for questions or concerns in the interim.      Medical Dictation software was used during the dictation of portions or the entirety of this medical record.  Phonetic or grammatic errors may exist due to the use of this software. For clarification, refer to the author of the document.

## 2023-06-07 NOTE — PROGRESS NOTES
CC: Right knee pain    42 y.o. female presents for MRI review of right knee .  Concern for MCL tear. Patient does not report any new incidents or injuries since their last appointment. Pain and symptoms remain unchanged since his last appointment. Here today to discuss treatment options. Patient is referred to me by Timi Bee PA-C; see history below.       HPI (6/1/2023):  Patient is a 42-year-old female who presents today for initial evaluation of right knee pain.   Patient reports that she injured her right knee on Tuesday while at a party.  She was standing at the end of a water slide when a child came down the slide and hit the side of her right knee applying a significant amount of valgus force.  She reports feeling sharp, intense pain at the time of the injury and fell to the ground.  Since then, she is had significant difficulty bearing weight and has been ambulating with the assistance of crutches and a knee brace.  She localizes the pain to the medial aspect of the knee without radiation.  This is worse with knee flexion, bending the knee, and when pivoting to the side.  She does not recall hearing or feeling a pop immediately at the time of injury.  She has been experiencing some soreness and swelling over the medial aspect of the knee since this occurred.  The knee feels subjectively unstable. No prior injuries or surgeries on the right knee.   She has been taking ibuprofen 800 mg, icing, and elevating the right knee with only slight relief here today to discuss diagnosis and treatment options moving forward.    - mechanical symptoms, + instability    Is affecting ADLs.  Pain is 7/10 at it's worst.    Vocation:  School nurse    REVIEW OF SYSTEMS:   Constitution: Negative. Negative for chills, fever and night sweats.   HENT: Negative for congestion and headaches.    Eyes: Negative for blurred vision, left vision loss and right vision loss.   Cardiovascular: Negative for chest pain and syncope.    Respiratory: Negative for cough and shortness of breath.    Endocrine: Negative for polydipsia, polyphagia and polyuria.   Hematologic/Lymphatic: Negative for bleeding problem. Does not bruise/bleed easily.   Skin: Negative for dry skin, itching and rash.   Musculoskeletal: Negative for falls. Positive for right knee pain and  muscle weakness.   Gastrointestinal: Negative for abdominal pain and bowel incontinence.   Genitourinary: Negative for bladder incontinence and nocturia.   Neurological: Negative for disturbances in coordination, loss of balance and seizures.   Psychiatric/Behavioral: Negative for depression. The patient does not have insomnia.    Allergic/Immunologic: Negative for hives and persistent infections.     PAST MEDICAL HISTORY:   No past medical history on file.    PAST SURGICAL HISTORY:   Past Surgical History:   Procedure Laterality Date     SECTION      HAND SURGERY         FAMILY HISTORY:   Family History   Problem Relation Age of Onset    Diabetes Maternal Grandmother     Cancer Maternal Grandmother     Colon cancer Maternal Uncle     Breast cancer Neg Hx     Ovarian cancer Neg Hx        SOCIAL HISTORY:   Social History     Socioeconomic History    Marital status:    Tobacco Use    Smoking status: Never    Smokeless tobacco: Never   Substance and Sexual Activity    Alcohol use: Yes     Comment: socially    Drug use: No    Sexual activity: Yes     Partners: Male     Birth control/protection: Condom       MEDICATIONS:     Current Outpatient Medications:     albuterol (VENTOLIN HFA) 90 mcg/actuation inhaler, Inhale 2 puffs into the lungs every 6 (six) hours as needed for Wheezing. Rescue (Patient not taking: Reported on 2022), Disp: 18 g, Rfl: 0    fluconazole (DIFLUCAN) 150 MG Tab, Take by mouth., Disp: , Rfl:     ibuprofen (ADVIL,MOTRIN) 800 MG tablet, Take 1 tablet (800 mg total) by mouth 3 (three) times daily as needed for Pain., Disp: 60 tablet, Rfl: 1    ALLERGIES:    Review of patient's allergies indicates:  No Known Allergies    VITAL SIGNS:   There were no vitals taken for this visit.     PHYSICAL EXAMINATION:  General:  The patient is alert and oriented x 3.  Mood is pleasant.  Observation of ears, eyes and nose reveal no gross abnormalities.  No labored breathing observed.    RIGHT KNEE EXAMINATION     OBSERVATION / INSPECTION   Gait:   antalgic with crutches and long runner knee brace  Alignment:  Neutral   Scars:   None   Muscle atrophy: Mild  Effusion:  None   Warmth:  None   Discoloration:   Ecchymosis present over medial knee  Swelling:  Mild soft tissue swelling over medial joint line/MCL    TENDERNESS / CREPITUS (T / C):          T / C      T / C   Patella   - / -   Lateral joint line   - / -   Peripatellar medial  -  Medial joint line    + / -   Peripatellar lateral -  Medial plica   - / -   Patellar tendon -   Popliteal fossa   - / -   Quad tendon   -   Gastrocnemius   -   Prepatellar Bursa - / -   Quadricep   -   Tibial tubercle  -  Thigh/hamstring  -   Pes anserine/HS -  Fibula    -   ITB   - / -  Tibia     -   Tib/fib joint  - / -  LCL    -     MFC   - / -   MCL: Proximal  +    LFC   - / -    Distal   +          ROM: (* = pain)  PASSIVE   ACTIVE    Left :   5 / 0 / 145   5 / 0 / 145     Right :    5 / 0 / 95*   5 / 0 / 90*    Patellofemoral examination:  See above noted areas of tenderness.   Patella position    Subluxation / dislocation: Centered           Sup. / Inf;   Normal   Crepitus (PF):    Absent   Patellar Mobility:       Medial-lateral:   Normal    Superior-inferior:  Normal    Inferior tilt   Normal    Patellar tendon:  Normal   Lateral tilt:    Normal   J-sign:     None   Patellofemoral grind:   No pain       MENISCAL SIGNS:     Pain on terminal extension:  -  Pain on terminal flexion:  +  Ramóns maneuver:  + (for pain)  Squat     deferred    LIGAMENT EXAMINATION:  ACL / Lachman:  normal (-1 to 2mm)    PCL-Post.  drawer: normal 0 to 2mm  MCL-  Valgus:  Grade 2-3  LCL- Varus:  normal 0 to 2mm  Pivot shift: normal (Equal)   Dial Test: difference c/w other side   At 30° flexion: normal (< 5°)    At 90° flexion: normal (< 5°)   Reverse Pivot Shift:   normal (Equal)     STRENGTH: (* = with pain) PAINFUL SIDE   Quadricep   4/5   Hamstrin/5    EXTREMITY NEURO-VASCULAR EXAMINATION:   Sensation:  Grossly intact to light touch all dermatomal regions.   Motor Function:  Fully intact motor function at hip, knee, foot and ankle    DTRs;  quadriceps and  achilles 2+.  No clonus and downgoing Babinski.    Vascular status:  DP and PT pulses 2+, brisk capillary refill, symmetric.     OTHER FINDINGS:  N/A    X-rays bilateral knees (2023):  FINDINGS:  Joint spaces are maintained bony structures are intact.  No joint effusion is seen on either side.  Tibial tubercle is prominent bilaterally.     MRI right knee (2023):  FINDINGS:  Medial compartment: No meniscal tear.  No cartilage defect or subchondral bone marrow edema.     Lateral compartment: No meniscal tear. No cartilage defect or subchondral bone marrow edema.     Patellofemoral compartment: No cartilage defect or subchondral bone marrow edema.     Tendons: Quadriceps, popliteus, and biceps femoris tendons are intact.  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     Ligaments: Full-thickness tear of MCL just distal to the femoral attachment.  Soft tissue edema extends along the posterior oblique ligament and medial infrapatellar retinaculum.  The ACL, PCL, and LCL are intact.     Bone: No acute fracture, osteonecrosis, or focal lesion.     Joint: Small joint effusion.  No Baker's cyst.  There is subcutaneous edema in the anterior knee.  No bursal collection.     Impression:     Grade 3 MCL sprain.     ASSESSMENT:    Right Knee Pain   Grade 3 MCL injury    PLAN:   Arthroscopic and open MCL Repair/Reconstruction with arhtrex internal  brace. (June 28)    All questions were answered, pt will contact us for questions or concerns in the interim.

## 2023-06-08 ENCOUNTER — OFFICE VISIT (OUTPATIENT)
Dept: SPORTS MEDICINE | Facility: CLINIC | Age: 43
End: 2023-06-08
Payer: COMMERCIAL

## 2023-06-08 VITALS
HEART RATE: 93 BPM | HEIGHT: 62 IN | DIASTOLIC BLOOD PRESSURE: 73 MMHG | SYSTOLIC BLOOD PRESSURE: 111 MMHG | WEIGHT: 180 LBS | BODY MASS INDEX: 33.13 KG/M2

## 2023-06-08 DIAGNOSIS — S83.411A COMPLETE TEAR OF MEDIAL COLLATERAL LIGAMENT OF RIGHT KNEE, INITIAL ENCOUNTER: Primary | ICD-10-CM

## 2023-06-08 DIAGNOSIS — S83.411A TEAR OF MEDIAL COLLATERAL LIGAMENT OF RIGHT KNEE, INITIAL ENCOUNTER: Primary | ICD-10-CM

## 2023-06-08 DIAGNOSIS — M94.261 CHONDROMALACIA OF RIGHT KNEE: ICD-10-CM

## 2023-06-08 PROCEDURE — 3074F SYST BP LT 130 MM HG: CPT | Mod: CPTII,S$GLB,, | Performed by: ORTHOPAEDIC SURGERY

## 2023-06-08 PROCEDURE — 3008F PR BODY MASS INDEX (BMI) DOCUMENTED: ICD-10-PCS | Mod: CPTII,S$GLB,, | Performed by: ORTHOPAEDIC SURGERY

## 2023-06-08 PROCEDURE — 1159F PR MEDICATION LIST DOCUMENTED IN MEDICAL RECORD: ICD-10-PCS | Mod: CPTII,S$GLB,, | Performed by: ORTHOPAEDIC SURGERY

## 2023-06-08 PROCEDURE — 99214 OFFICE O/P EST MOD 30 MIN: CPT | Mod: S$GLB,,, | Performed by: ORTHOPAEDIC SURGERY

## 2023-06-08 PROCEDURE — 99214 PR OFFICE/OUTPT VISIT, EST, LEVL IV, 30-39 MIN: ICD-10-PCS | Mod: S$GLB,,, | Performed by: ORTHOPAEDIC SURGERY

## 2023-06-08 PROCEDURE — 3008F BODY MASS INDEX DOCD: CPT | Mod: CPTII,S$GLB,, | Performed by: ORTHOPAEDIC SURGERY

## 2023-06-08 PROCEDURE — 3078F DIAST BP <80 MM HG: CPT | Mod: CPTII,S$GLB,, | Performed by: ORTHOPAEDIC SURGERY

## 2023-06-08 PROCEDURE — 99999 PR PBB SHADOW E&M-EST. PATIENT-LVL III: CPT | Mod: PBBFAC,,, | Performed by: ORTHOPAEDIC SURGERY

## 2023-06-08 PROCEDURE — 3078F PR MOST RECENT DIASTOLIC BLOOD PRESSURE < 80 MM HG: ICD-10-PCS | Mod: CPTII,S$GLB,, | Performed by: ORTHOPAEDIC SURGERY

## 2023-06-08 PROCEDURE — 99999 PR PBB SHADOW E&M-EST. PATIENT-LVL III: ICD-10-PCS | Mod: PBBFAC,,, | Performed by: ORTHOPAEDIC SURGERY

## 2023-06-08 PROCEDURE — 3074F PR MOST RECENT SYSTOLIC BLOOD PRESSURE < 130 MM HG: ICD-10-PCS | Mod: CPTII,S$GLB,, | Performed by: ORTHOPAEDIC SURGERY

## 2023-06-08 PROCEDURE — 1159F MED LIST DOCD IN RCRD: CPT | Mod: CPTII,S$GLB,, | Performed by: ORTHOPAEDIC SURGERY

## 2023-06-09 ENCOUNTER — TELEPHONE (OUTPATIENT)
Dept: SPORTS MEDICINE | Facility: CLINIC | Age: 43
End: 2023-06-09
Payer: COMMERCIAL

## 2023-06-09 DIAGNOSIS — S83.411A TEAR OF MEDIAL COLLATERAL LIGAMENT OF RIGHT KNEE, INITIAL ENCOUNTER: Primary | ICD-10-CM

## 2023-06-09 NOTE — TELEPHONE ENCOUNTER
POST OP PT -     MRI Impression:   MRI Knee Without Contrast Right  Narrative: EXAMINATION:  MRI KNEE WITHOUT CONTRAST RIGHT    CLINICAL HISTORY:  Knee trauma, internal derangement suspected, xray done;Pain in right knee    TECHNIQUE:  Multiplanar, multisequence images were performed about the right knee.  No contrast was administered.    COMPARISON:  Knee radiographs 06/01/2023    FINDINGS:  Medial compartment: No meniscal tear.  No cartilage defect or subchondral bone marrow edema.    Lateral compartment: No meniscal tear. No cartilage defect or subchondral bone marrow edema.    Patellofemoral compartment: No cartilage defect or subchondral bone marrow edema.    Tendons: Quadriceps, popliteus, and biceps femoris tendons are intact.  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    Ligaments: Full-thickness tear of MCL just distal to the femoral attachment.  Soft tissue edema extends along the posterior oblique ligament and medial infrapatellar retinaculum.  The ACL, PCL, and LCL are intact.    Bone: No acute fracture, osteonecrosis, or focal lesion.    Joint: Small joint effusion.  No Baker's cyst.  There is subcutaneous edema in the anterior knee.  No bursal collection.  Impression: Grade 3 MCL sprain.    Electronically signed by resident: Devi Koroma  Date:    06/05/2023  Time:    11:57    Electronically signed by: Niraj Cardona  Date:    06/05/2023  Time:    14:10      Surgical Plan:   PLAN:   Arthroscopic and open MCL Repair/Reconstruction with arhtrex internal brace. (June 28)    DOS 6/29/23    Start PT on 6/30/29      ----- Message from Sita Gonzalez MA sent at 6/8/2023  3:06 PM CDT -----  Patient will do PT at [facility]    Pre op - 6/27/23    Date of surgery - 6/28/23

## 2023-06-13 ENCOUNTER — CLINICAL SUPPORT (OUTPATIENT)
Dept: LAB | Facility: HOSPITAL | Age: 43
End: 2023-06-13
Payer: COMMERCIAL

## 2023-06-13 ENCOUNTER — OFFICE VISIT (OUTPATIENT)
Dept: FAMILY MEDICINE | Facility: HOSPITAL | Age: 43
End: 2023-06-13
Payer: COMMERCIAL

## 2023-06-13 VITALS
DIASTOLIC BLOOD PRESSURE: 65 MMHG | BODY MASS INDEX: 34.16 KG/M2 | HEART RATE: 88 BPM | HEIGHT: 62 IN | SYSTOLIC BLOOD PRESSURE: 120 MMHG | WEIGHT: 185.63 LBS

## 2023-06-13 DIAGNOSIS — Z01.818 PRE-OP EVALUATION: Primary | ICD-10-CM

## 2023-06-13 DIAGNOSIS — Z01.818 PRE-OP EVALUATION: ICD-10-CM

## 2023-06-13 PROCEDURE — 93005 ELECTROCARDIOGRAM TRACING: CPT

## 2023-06-13 PROCEDURE — 93010 EKG 12-LEAD: ICD-10-PCS | Mod: ,,, | Performed by: INTERNAL MEDICINE

## 2023-06-13 PROCEDURE — 99213 OFFICE O/P EST LOW 20 MIN: CPT | Performed by: STUDENT IN AN ORGANIZED HEALTH CARE EDUCATION/TRAINING PROGRAM

## 2023-06-13 PROCEDURE — 93010 ELECTROCARDIOGRAM REPORT: CPT | Mod: ,,, | Performed by: INTERNAL MEDICINE

## 2023-06-13 NOTE — PROGRESS NOTES
Subjective:      Patient ID: Tato Godinez is a 42 y.o. female.    Chief Complaint: No chief complaint on file.    41 yo F presenting for pre-op evaluation prior to scheduled R knee MCL repair/reconstruction on June 28th. Injury to R knee occured May 30th, after a child hit her knee at water slide. Prior to injury, she denies any physical limitations, she could go about her activities with respiratory issues or chest pains. She endorses occasional sharp chest pain in her L upper chest at rest, it lasts for <1 minute and resolves on its own. It is intermittent when it does occur, denies radiation of sharp pain to shoulder or jaw, no dyspnea or nausea or vision changes during these episodes, last episode occurred 1 month ago.     Pt denies chest pain w/ exertion, dyspnea at rest or w/ exertion, PND, LE edema, claudication, or palpitations. Pt has no history of ischemic heart disease, CHF, CVD, diabetes, recent anticoagulant or antithrombic use, personal or family history of coagulopathy. Pt denies history of stress test, cardiac cath,or coronary revascularization. Pt reports being able to achieve >4 METs activity. She reports she can ambulate up 2 flights of stairs without difficulty.    Review of Systems   Constitutional:  Negative for chills and fever.   Eyes:  Negative for visual disturbance.   Respiratory:  Negative for cough and shortness of breath.    Cardiovascular:  Negative for chest pain.   Gastrointestinal:  Negative for abdominal pain, nausea and vomiting.   Genitourinary:  Negative for difficulty urinating and dysuria.   Musculoskeletal:  Negative for gait problem.   Neurological:  Negative for headaches.    Objective:     Vitals:    06/13/23 1321   BP: 120/65   Pulse: 88     Physical Exam  Vitals and nursing note reviewed.   Constitutional:       General: She is not in acute distress.     Appearance: Normal appearance. She is not ill-appearing.   Eyes:      General:         Right eye: No discharge.          Left eye: No discharge.      Conjunctiva/sclera: Conjunctivae normal.   Cardiovascular:      Rate and Rhythm: Normal rate and regular rhythm.   Pulmonary:      Effort: Pulmonary effort is normal. No respiratory distress.      Breath sounds: Normal breath sounds. No wheezing.   Abdominal:      Palpations: Abdomen is soft.      Tenderness: There is no abdominal tenderness.   Neurological:      Mental Status: She is alert.   Psychiatric:         Behavior: Behavior normal.     Assessment:      1. Pre-op evaluation      Plan:     Pre-op evaluation  -     EKG 12-lead; Future    Evaluating Risk in Surgery is a combination of patients risk factors and surgical risk factors. Patient is being evaluated for medical optimization and not surgical clearance. MACE is the major adverse cardiovascular event risk, and can be assessed based on the Revised Cardiac Risk Index     RCRI: 0 points, class 1 risk.      Surgical Risks: Vascular/cardiothoracic/emergent surgeries are high risk and carry a >5% risk of MI. Head & Neck/Abdominal/Orthopedic/Prostate surgeries are intermediate risk and carry a 1-5% risk of MI. Endoscopic/Cataract/Plastic/Breast surgeries are low risk and carry <1% risk of MI.     Per ACC/AHA colin-operative guidelines, moderate risk surgery and METS >=4 is low risk for MACE and no further cardiac testing is required.     Per evaluation, patient is low risk for a moderate risk surgery. Patient is medically optimized for surgery at this time.    Follow up in about 3 months (around 9/13/2023).

## 2023-06-14 ENCOUNTER — PATIENT MESSAGE (OUTPATIENT)
Dept: PREADMISSION TESTING | Facility: HOSPITAL | Age: 43
End: 2023-06-14
Payer: COMMERCIAL

## 2023-06-14 NOTE — ANESTHESIA PAT ROS NOTE
2023  Tato Godinez is a 42 y.o., female.      Pre-op Assessment    I have reviewed the Patient Summary Reports.       I have reviewed the Medications.     Review of Systems  Anesthesia Hx:  No problems with previous Anesthesia               Denies Personal Hx of Anesthesia complications.                    Social:  Non-Smoker, Social Alcohol Use       Hematology/Oncology:  Hematology Normal   Oncology Normal                                   EENT/Dental:  EENT/Dental Normal           Cardiovascular:  Exercise tolerance: good       Denies CAD.       Denies Angina.       Denies FLORES.                            Pulmonary:  Pulmonary Normal    Denies Asthma.   Denies Shortness of breath.                  Renal/:  Renal/ Normal                 Hepatic/GI:  Hepatic/GI Normal     Denies GERD. Denies Liver Disease.            Musculoskeletal:     Tear of medial collateral ligament of right knee,   Chondromalacia of right knee,  H/O Hand surgery              OB/GYN/PEDS:  H/O            Neurological:  Neurology Normal      Denies Headaches. Denies Seizures.                                Endocrine:  Denies Diabetes.         Obesity / BMI > 30  Psych:  Psychiatric Normal                   No past medical history on file.  Past Surgical History:   Procedure Laterality Date     SECTION      HAND SURGERY         Anesthesia Assessment: Preoperative EQUATION    Planned Procedure: Procedure(s) (LRB):  ARTHROSCOPY, KNEE MCL LIGAMENT RESCONSTRUCTION (Right)  RECONSTRUCTION, LIGAMENT (Right)  Requested Anesthesia Type:General  Surgeon: Lamine Early MD  Service: Orthopedics  Known or anticipated Date of Surgery:2023    Surgeon notes: reviewed    Electronic QUestionnaire Assessment completed via nurse interview with patient.        Triage considerations:     The patient has no apparent  active cardiac condition (No unstable coronary Syndrome such as severe unstable angina or recent [<1 month] myocardial infarction, decompensated CHF, severe valvular   disease or significant arrhythmia)    Previous anesthesia records:No problems and Not available    Last PCP note: within 1 month , within [facility]   Patient is optimized for surgery by PCP, she is at low risk for a moderate risk surgery.     Other important co-morbidities: Obesity       EKG 5/13/2023:  Vent. Rate : 076 BPM     Atrial Rate : 076 BPM      P-R Int : 160 ms          QRS Dur : 076 ms       QT Int : 378 ms       P-R-T Axes : 050 063 033 degrees      QTc Int : 425 ms   Normal sinus rhythm   Normal ECG   No previous ECGs available   Confirmed by Cristina Carrington MD ([id number]) on 6/14/2023 7:57:25 AM       Instructions given. (See in Nurse's note)      Ht: 5'2  Wt: 185 lb  BMI: 33.95  Vaccinated

## 2023-06-27 ENCOUNTER — OFFICE VISIT (OUTPATIENT)
Dept: SPORTS MEDICINE | Facility: CLINIC | Age: 43
End: 2023-06-27
Payer: COMMERCIAL

## 2023-06-27 ENCOUNTER — ANESTHESIA EVENT (OUTPATIENT)
Dept: SURGERY | Facility: HOSPITAL | Age: 43
End: 2023-06-27
Payer: COMMERCIAL

## 2023-06-27 VITALS
BODY MASS INDEX: 34.01 KG/M2 | DIASTOLIC BLOOD PRESSURE: 70 MMHG | WEIGHT: 184.81 LBS | SYSTOLIC BLOOD PRESSURE: 104 MMHG | HEART RATE: 76 BPM | HEIGHT: 62 IN

## 2023-06-27 DIAGNOSIS — S83.411A TEAR OF MEDIAL COLLATERAL LIGAMENT OF RIGHT KNEE, INITIAL ENCOUNTER: Primary | ICD-10-CM

## 2023-06-27 PROCEDURE — 99499 NO LOS: ICD-10-PCS | Mod: S$GLB,,, | Performed by: PHYSICIAN ASSISTANT

## 2023-06-27 PROCEDURE — 99999 PR PBB SHADOW E&M-EST. PATIENT-LVL III: ICD-10-PCS | Mod: PBBFAC,,, | Performed by: PHYSICIAN ASSISTANT

## 2023-06-27 PROCEDURE — 99999 PR PBB SHADOW E&M-EST. PATIENT-LVL III: CPT | Mod: PBBFAC,,, | Performed by: PHYSICIAN ASSISTANT

## 2023-06-27 PROCEDURE — 99499 UNLISTED E&M SERVICE: CPT | Mod: S$GLB,,, | Performed by: PHYSICIAN ASSISTANT

## 2023-06-27 RX ORDER — NAPROXEN SODIUM 220 MG/1
81 TABLET, FILM COATED ORAL 2 TIMES DAILY
Qty: 28 TABLET | Refills: 0 | Status: SHIPPED | OUTPATIENT
Start: 2023-06-27 | End: 2023-07-12

## 2023-06-27 RX ORDER — OXYCODONE HYDROCHLORIDE 5 MG/1
5 TABLET ORAL EVERY 6 HOURS PRN
Qty: 28 TABLET | Refills: 0 | Status: SHIPPED | OUTPATIENT
Start: 2023-06-27

## 2023-06-27 RX ORDER — TRAMADOL HYDROCHLORIDE 50 MG/1
50 TABLET ORAL EVERY 6 HOURS PRN
Qty: 20 TABLET | Refills: 0 | Status: SHIPPED | OUTPATIENT
Start: 2023-06-27

## 2023-06-27 RX ORDER — CEFAZOLIN SODIUM 2 G/50ML
2 SOLUTION INTRAVENOUS
Status: CANCELLED | OUTPATIENT
Start: 2023-06-27

## 2023-06-27 RX ORDER — ONDANSETRON 4 MG/1
4 TABLET, FILM COATED ORAL EVERY 8 HOURS PRN
Qty: 20 TABLET | Refills: 0 | Status: SHIPPED | OUTPATIENT
Start: 2023-06-27

## 2023-06-27 RX ORDER — SODIUM CHLORIDE 9 MG/ML
INJECTION, SOLUTION INTRAVENOUS CONTINUOUS
Status: CANCELLED | OUTPATIENT
Start: 2023-06-27

## 2023-06-27 NOTE — H&P
Tato Godinez  is here for a completion of her perioperative paperwork. she  Is scheduled to undergo:      Arthroscopic and open MCL Repair/Reconstruction with arhtrex internal brace, possible chondroplasty, possible partial menisectomy versus repair    on 2023.      She is a healthy individual but does need clearance for this procedure.    Patient is cleared to proceed with surgery.      PAST MEDICAL HISTORY: History reviewed. No pertinent past medical history.  PAST SURGICAL HISTORY:   Past Surgical History:   Procedure Laterality Date     SECTION      HAND SURGERY       FAMILY HISTORY:   Family History   Problem Relation Age of Onset    Diabetes Maternal Grandmother     Cancer Maternal Grandmother     Colon cancer Maternal Uncle     Breast cancer Neg Hx     Ovarian cancer Neg Hx      SOCIAL HISTORY:   Social History     Socioeconomic History    Marital status:    Tobacco Use    Smoking status: Never    Smokeless tobacco: Never   Substance and Sexual Activity    Alcohol use: Yes     Comment: socially    Drug use: No    Sexual activity: Yes     Partners: Male     Birth control/protection: Condom       MEDICATIONS:   Current Outpatient Medications:     aspirin 81 MG Chew, Take 1 tablet (81 mg total) by mouth 2 (two) times a day. for 14 days, Disp: 28 tablet, Rfl: 0    ibuprofen (ADVIL,MOTRIN) 800 MG tablet, Take 1 tablet (800 mg total) by mouth 3 (three) times daily as needed for Pain. (Patient not taking: Reported on 2023), Disp: 60 tablet, Rfl: 1    ondansetron (ZOFRAN) 4 MG tablet, Take 1 tablet (4 mg total) by mouth every 8 (eight) hours as needed for Nausea., Disp: 20 tablet, Rfl: 0    oxyCODONE (ROXICODONE) 5 MG immediate release tablet, Take 1 tablet (5 mg total) by mouth every 6 (six) hours as needed for Pain., Disp: 28 tablet, Rfl: 0    traMADoL (ULTRAM) 50 mg tablet, Take 1 tablet (50 mg total) by mouth every 6 (six) hours as needed for Pain., Disp: 20 tablet, Rfl:  "0  ALLERGIES: Review of patient's allergies indicates:  No Known Allergies    VITAL SIGNS: /70   Pulse 76   Ht 5' 2" (1.575 m)   Wt 83.8 kg (184 lb 12.8 oz)   LMP 06/11/2023   BMI 33.80 kg/m²      Risks, indications and benefits of the surgical procedure were discussed with the patient. All questions with regard to surgery, rehab, expected return to functional activities, activities of daily living and recreational endeavors were answered to her satisfaction.    It was explained to the patient that there may be an increase in surgical risks if the patient has certain co-morbidities such as but not limited to: Obesity, Cardiovascular issues (CHF, CAD, Arrhythmias), chronic pulmonary issues, previous or current neurovascular/neurological issues, previous strokes, diabetes mellitus, previous wound healing issues, previous wound or skin infections, PVD, clotting disorders, if the patient uses chronic steroids, if the patient takes or has immune compromising medications or diseases, or has previously or currently used tobacco products.     The patient verbalized that he/she does not have any additional clotting, bleeding, or blood disorders, other than what is list in her chart on today's review.     Then a brief history and physical exam were performed.    Review of Systems   Constitution: Negative. Negative for chills, fever and night sweats.   HENT: Negative for congestion and headaches.    Eyes: Negative for blurred vision, left vision loss and right vision loss.   Cardiovascular: Negative for chest pain and syncope.   Respiratory: Negative for cough and shortness of breath.    Endocrine: Negative for polydipsia, polyphagia and polyuria.   Hematologic/Lymphatic: Negative for bleeding problem. Does not bruise/bleed easily.   Skin: Negative for dry skin, itching and rash.   Musculoskeletal: Negative for falls and muscle weakness.   Gastrointestinal: Negative for abdominal pain and bowel incontinence. "   Genitourinary: Negative for bladder incontinence and nocturia.   Neurological: Negative for disturbances in coordination, loss of balance and seizures.   Psychiatric/Behavioral: Negative for depression. The patient does not have insomnia.    Allergic/Immunologic: Negative for hives and persistent infections.     PHYSICAL EXAM:  GEN: A&Ox3, WD WN NAD  HEENT: WNL  CHEST: CTAB, no W/R/R  HEART: RRR, no M/R/G  ABD: Soft, NT ND, BS x4 QUADS  MS; See Epic  NEURO: CN II-XII intact       The surgical consent was then reviewed with the patient, who agreed with all the contents of the consent form and it was signed. she was then given the [facility] surgery packet to bring with her to surgery for the anesthesia portion of her perioperative paperwork.   For all physicians except for Dr. Early, we will email and possibly fax the consent forms and booking sheets to [facility] pre-admit.    The patient was given the opportunity to ask questions about the surgical plan and consent form, and once no other questions were asked, I proceeded with the pre-op appointment.    PHYSICAL THERAPY:  She was also instructed regarding physical therapy and will begin on POD#2 at the [place] location.     POST OP CARE:instructions were reviewed including care of the wound and dressing after surgery and when she can shower.     CRUTCHES OR WALKER: It was explained to the patient that if they are having a lower extremity surgery that they will require either a walker or crutches to ambulate safely with after surgery. It was explained that a cane or other assistive devices are not sufficient to safely ambulate with after surgery. I explained to the patient that I will place an order for them to receive either crutches or a walker after surgery to go home with. It was explained that if they have crutches or a walker at home already, that they are REQUIRED to bring them to the hospital on the day of surgery. It was explained  that if they do not have them at the hospital on the day of surgery that they WILL be provided a new pair or crutches or a walker to go home with to ensure ambulation will be safe if the patient needs to stop somewhere on the way home.      PAIN MANAGEMENT: Tato Godinez was also given their pain management regimen, which includes the TENS unit given to her by [facility] along with the education required for its use.     PAIN MEDICATION:  Roxicodone 5mg 1 po q 4-6 hours prn pain  Ultram 50 mg Take 1-2 p.o. q.6 hours p.r.n. breakthrough pain,   Zofran 4 mg one p.o. q.8 hours p.r.n. nausea and vomiting.    Post op meds to be delivered bedside prior to discharge. Deliver to family if patient is in surgery at 5pm.    The patient was told that narcotic pain medications may make them drowsy and instructions were given to not sign legal documents, drive or operate heavy machinery, cars, or equipment while under the influence of narcotic medications. The patient was told and understands that narcotic pain medications should only be used as needed to control pain and that other options of pain control include TENs unit and ice packs/unit.     Patient was instructed to purchase and take Colace to counter possible GI side effects of taking opiates.     DVT prophylaxis was discussed with the patient today including risk factors for developing DVTs and history of DVTs. The patient was asked if any specific recommendations were given from the doctor/s that did pre-operative surgical clearance. The patient was then given an education sheet about DVTs and PE with warning signs and symptoms of both and steps to take if they suspect either of these.    Patient was asked if they were taking or using OCP pills or devices. If they answered yes, then they were instructed to stop using OCPs at this pre-operative appointment until 2 months post-op to help prevent DVT development. They understand that there are other forms of birth  control that do not involve hormones. They expressed understanding that ignoring/not following this instruction could result in a DVT which could turn into a deadly pulmonary embolism.     This along with the Modified Caprini risk assessment model for VTE in general surgical patients was used to determine the patient's DVT risk.     From: Yuri MK, Carlos DA, Kati SM, et al. Prevention of VTE in nonorthopedic surgical patients: antithrombotic therapy and prevention of thrombosis, 9th ed: American College of Chest Physicians evidence-based clinical practical guidelines. Chest 2012; 141:e227S. Copyright © 2012. Reproduced with permission from the American College of Chest Physicians.    The below listed DVT prophylaxis regimen was discussed along with SCDs during surgery and bilateral CHARLIE compression stockings to be used post-op. Length of treatment has been determined to be 10-42 days post-op by the above noted Caprini assessment model. Early ambulation post-op was also discussed and emphasized with the patient.     Patient was instructed to buy and take:  Aspirin 325mg QD x 3 weeks for DVT prophylaxis starting on the evening after surgery.  Patient will also use bilateral TEDs on lower extremities, SCDs during surgery, and early ambulation post-op. If the patient was previously taking 81mg baby aspirin, they were told to not take it will using the above stated aspirin and to restart the 81mg aspirin after completion of the aspirin dose.      Patient was also told to buy over the counter Prilosec medication and take it once daily for GI protection as long as they are taking NSAIDs or Aspirin.     Published data in Salas HWANG, et al. J Arthroplasty. Oct; 31(10):2237-40, 2016; showed that aspirin use as prophylaxis during revision total joint arthroplasty was more effective than warfarin in preventing symptomatic venous thromboembolic events and was associated with lower complications.  Patients in the study were  also treated with intermittent pneumatic compression devices. Compression stockings would be our method of mechanical prophylaxis, which has been shown to be similar to pneumatic compression in the systematic review, Solo RJ, et al. Nahomi Surg. Feb; 239(2): 162-171, 2004.     Results showed a significantly higher incidence of symptomatic venous thromboembolic events among patients in the warfarin group vs. the aspirin group (1.75% vs. 0.56%). Researchers also noted a bleeding event rate of 1.5% among patients who received warfarin compared with a rate of 0.4% among patients who received aspirin.    Patient denies history of seizures.     I explained to following and the patient expressed understanding:  The patient is currently aware of the COVID19 pandemic and that proceeding with their surgical procedure could potentially increase exposure to coronavirus in the community. The patient understands that there is the possibility of delayed or cancelled appts or PT visits in the future. They understand that infection with the coronavirus could complicate their surgery recovery. They are aware of the current policies and procedures of [facility] and the government regarding the pandemic and they were given the option of delaying my surgery. The patient elects to proceed with surgery at this time.       The patient was instructed to practice strict social distancing, hand washing/hygiene, respiratory hygiene, and cough etiquette from now until 6 weeks following surgery to reduce the risk of maame coronavirus.    As there were no other questions to be asked, she was given my business card along with Lamine Early MD business card if she has any questions or concerns prior to surgery or in the postop period.

## 2023-06-27 NOTE — H&P (VIEW-ONLY)
Tato Godinez  is here for a completion of her perioperative paperwork. she  Is scheduled to undergo:      Arthroscopic and open MCL Repair/Reconstruction with arhtrex internal brace, possible chondroplasty, possible partial menisectomy versus repair    on 2023.      She is a healthy individual but does need clearance for this procedure.    Patient is cleared to proceed with surgery.      PAST MEDICAL HISTORY: History reviewed. No pertinent past medical history.  PAST SURGICAL HISTORY:   Past Surgical History:   Procedure Laterality Date     SECTION      HAND SURGERY       FAMILY HISTORY:   Family History   Problem Relation Age of Onset    Diabetes Maternal Grandmother     Cancer Maternal Grandmother     Colon cancer Maternal Uncle     Breast cancer Neg Hx     Ovarian cancer Neg Hx      SOCIAL HISTORY:   Social History     Socioeconomic History    Marital status:    Tobacco Use    Smoking status: Never    Smokeless tobacco: Never   Substance and Sexual Activity    Alcohol use: Yes     Comment: socially    Drug use: No    Sexual activity: Yes     Partners: Male     Birth control/protection: Condom       MEDICATIONS:   Current Outpatient Medications:     aspirin 81 MG Chew, Take 1 tablet (81 mg total) by mouth 2 (two) times a day. for 14 days, Disp: 28 tablet, Rfl: 0    ibuprofen (ADVIL,MOTRIN) 800 MG tablet, Take 1 tablet (800 mg total) by mouth 3 (three) times daily as needed for Pain. (Patient not taking: Reported on 2023), Disp: 60 tablet, Rfl: 1    ondansetron (ZOFRAN) 4 MG tablet, Take 1 tablet (4 mg total) by mouth every 8 (eight) hours as needed for Nausea., Disp: 20 tablet, Rfl: 0    oxyCODONE (ROXICODONE) 5 MG immediate release tablet, Take 1 tablet (5 mg total) by mouth every 6 (six) hours as needed for Pain., Disp: 28 tablet, Rfl: 0    traMADoL (ULTRAM) 50 mg tablet, Take 1 tablet (50 mg total) by mouth every 6 (six) hours as needed for Pain., Disp: 20 tablet, Rfl:  "0  ALLERGIES: Review of patient's allergies indicates:  No Known Allergies    VITAL SIGNS: /70   Pulse 76   Ht 5' 2" (1.575 m)   Wt 83.8 kg (184 lb 12.8 oz)   LMP 06/11/2023   BMI 33.80 kg/m²      Risks, indications and benefits of the surgical procedure were discussed with the patient. All questions with regard to surgery, rehab, expected return to functional activities, activities of daily living and recreational endeavors were answered to her satisfaction.    It was explained to the patient that there may be an increase in surgical risks if the patient has certain co-morbidities such as but not limited to: Obesity, Cardiovascular issues (CHF, CAD, Arrhythmias), chronic pulmonary issues, previous or current neurovascular/neurological issues, previous strokes, diabetes mellitus, previous wound healing issues, previous wound or skin infections, PVD, clotting disorders, if the patient uses chronic steroids, if the patient takes or has immune compromising medications or diseases, or has previously or currently used tobacco products.     The patient verbalized that he/she does not have any additional clotting, bleeding, or blood disorders, other than what is list in her chart on today's review.     Then a brief history and physical exam were performed.    Review of Systems   Constitution: Negative. Negative for chills, fever and night sweats.   HENT: Negative for congestion and headaches.    Eyes: Negative for blurred vision, left vision loss and right vision loss.   Cardiovascular: Negative for chest pain and syncope.   Respiratory: Negative for cough and shortness of breath.    Endocrine: Negative for polydipsia, polyphagia and polyuria.   Hematologic/Lymphatic: Negative for bleeding problem. Does not bruise/bleed easily.   Skin: Negative for dry skin, itching and rash.   Musculoskeletal: Negative for falls and muscle weakness.   Gastrointestinal: Negative for abdominal pain and bowel incontinence. "   Genitourinary: Negative for bladder incontinence and nocturia.   Neurological: Negative for disturbances in coordination, loss of balance and seizures.   Psychiatric/Behavioral: Negative for depression. The patient does not have insomnia.    Allergic/Immunologic: Negative for hives and persistent infections.     PHYSICAL EXAM:  GEN: A&Ox3, WD WN NAD  HEENT: WNL  CHEST: CTAB, no W/R/R  HEART: RRR, no M/R/G  ABD: Soft, NT ND, BS x4 QUADS  MS; See Epic  NEURO: CN II-XII intact       The surgical consent was then reviewed with the patient, who agreed with all the contents of the consent form and it was signed. she was then given the [facility] surgery packet to bring with her to surgery for the anesthesia portion of her perioperative paperwork.   For all physicians except for Dr. Early, we will email and possibly fax the consent forms and booking sheets to [facility] pre-admit.    The patient was given the opportunity to ask questions about the surgical plan and consent form, and once no other questions were asked, I proceeded with the pre-op appointment.    PHYSICAL THERAPY:  She was also instructed regarding physical therapy and will begin on POD#2 at the [place] location.     POST OP CARE:instructions were reviewed including care of the wound and dressing after surgery and when she can shower.     CRUTCHES OR WALKER: It was explained to the patient that if they are having a lower extremity surgery that they will require either a walker or crutches to ambulate safely with after surgery. It was explained that a cane or other assistive devices are not sufficient to safely ambulate with after surgery. I explained to the patient that I will place an order for them to receive either crutches or a walker after surgery to go home with. It was explained that if they have crutches or a walker at home already, that they are REQUIRED to bring them to the hospital on the day of surgery. It was explained  that if they do not have them at the hospital on the day of surgery that they WILL be provided a new pair or crutches or a walker to go home with to ensure ambulation will be safe if the patient needs to stop somewhere on the way home.      PAIN MANAGEMENT: Tato Godinez was also given their pain management regimen, which includes the TENS unit given to her by [facility] along with the education required for its use.     PAIN MEDICATION:  Roxicodone 5mg 1 po q 4-6 hours prn pain  Ultram 50 mg Take 1-2 p.o. q.6 hours p.r.n. breakthrough pain,   Zofran 4 mg one p.o. q.8 hours p.r.n. nausea and vomiting.    Post op meds to be delivered bedside prior to discharge. Deliver to family if patient is in surgery at 5pm.    The patient was told that narcotic pain medications may make them drowsy and instructions were given to not sign legal documents, drive or operate heavy machinery, cars, or equipment while under the influence of narcotic medications. The patient was told and understands that narcotic pain medications should only be used as needed to control pain and that other options of pain control include TENs unit and ice packs/unit.     Patient was instructed to purchase and take Colace to counter possible GI side effects of taking opiates.     DVT prophylaxis was discussed with the patient today including risk factors for developing DVTs and history of DVTs. The patient was asked if any specific recommendations were given from the doctor/s that did pre-operative surgical clearance. The patient was then given an education sheet about DVTs and PE with warning signs and symptoms of both and steps to take if they suspect either of these.    Patient was asked if they were taking or using OCP pills or devices. If they answered yes, then they were instructed to stop using OCPs at this pre-operative appointment until 2 months post-op to help prevent DVT development. They understand that there are other forms of birth  control that do not involve hormones. They expressed understanding that ignoring/not following this instruction could result in a DVT which could turn into a deadly pulmonary embolism.     This along with the Modified Caprini risk assessment model for VTE in general surgical patients was used to determine the patient's DVT risk.     From: Yuri MK, Carlos DA, Kati SM, et al. Prevention of VTE in nonorthopedic surgical patients: antithrombotic therapy and prevention of thrombosis, 9th ed: American College of Chest Physicians evidence-based clinical practical guidelines. Chest 2012; 141:e227S. Copyright © 2012. Reproduced with permission from the American College of Chest Physicians.    The below listed DVT prophylaxis regimen was discussed along with SCDs during surgery and bilateral CHARLIE compression stockings to be used post-op. Length of treatment has been determined to be 10-42 days post-op by the above noted Caprini assessment model. Early ambulation post-op was also discussed and emphasized with the patient.     Patient was instructed to buy and take:  Aspirin 325mg QD x 3 weeks for DVT prophylaxis starting on the evening after surgery.  Patient will also use bilateral TEDs on lower extremities, SCDs during surgery, and early ambulation post-op. If the patient was previously taking 81mg baby aspirin, they were told to not take it will using the above stated aspirin and to restart the 81mg aspirin after completion of the aspirin dose.      Patient was also told to buy over the counter Prilosec medication and take it once daily for GI protection as long as they are taking NSAIDs or Aspirin.     Published data in Salas HWANG, et al. J Arthroplasty. Oct; 31(10):2237-40, 2016; showed that aspirin use as prophylaxis during revision total joint arthroplasty was more effective than warfarin in preventing symptomatic venous thromboembolic events and was associated with lower complications.  Patients in the study were  also treated with intermittent pneumatic compression devices. Compression stockings would be our method of mechanical prophylaxis, which has been shown to be similar to pneumatic compression in the systematic review, Solo RJ, et al. Nahomi Surg. Feb; 239(2): 162-171, 2004.     Results showed a significantly higher incidence of symptomatic venous thromboembolic events among patients in the warfarin group vs. the aspirin group (1.75% vs. 0.56%). Researchers also noted a bleeding event rate of 1.5% among patients who received warfarin compared with a rate of 0.4% among patients who received aspirin.    Patient denies history of seizures.     I explained to following and the patient expressed understanding:  The patient is currently aware of the COVID19 pandemic and that proceeding with their surgical procedure could potentially increase exposure to coronavirus in the community. The patient understands that there is the possibility of delayed or cancelled appts or PT visits in the future. They understand that infection with the coronavirus could complicate their surgery recovery. They are aware of the current policies and procedures of [facility] and the government regarding the pandemic and they were given the option of delaying my surgery. The patient elects to proceed with surgery at this time.       The patient was instructed to practice strict social distancing, hand washing/hygiene, respiratory hygiene, and cough etiquette from now until 6 weeks following surgery to reduce the risk of maame coronavirus.    As there were no other questions to be asked, she was given my business card along with Lamine Early MD business card if she has any questions or concerns prior to surgery or in the postop period.

## 2023-06-28 ENCOUNTER — HOSPITAL ENCOUNTER (OUTPATIENT)
Facility: HOSPITAL | Age: 43
Discharge: HOME | End: 2023-06-28
Attending: ORTHOPAEDIC SURGERY | Admitting: ORTHOPAEDIC SURGERY
Payer: COMMERCIAL

## 2023-06-28 ENCOUNTER — ANESTHESIA (OUTPATIENT)
Dept: SURGERY | Facility: HOSPITAL | Age: 43
End: 2023-06-28
Payer: COMMERCIAL

## 2023-06-28 DIAGNOSIS — S83.411A TEAR OF MEDIAL COLLATERAL LIGAMENT OF RIGHT KNEE, INITIAL ENCOUNTER: ICD-10-CM

## 2023-06-28 PROCEDURE — 63600175 PHARM REV CODE 636 W HCPCS: Performed by: ORTHOPAEDIC SURGERY

## 2023-06-28 PROCEDURE — 37000009 HC ANESTHESIA EA ADD 15 MINS: Performed by: ORTHOPAEDIC SURGERY

## 2023-06-28 PROCEDURE — 71000015 HC POSTOP RECOV 1ST HR: Performed by: ORTHOPAEDIC SURGERY

## 2023-06-28 PROCEDURE — 99900035 HC TECH TIME PER 15 MIN (STAT)

## 2023-06-28 PROCEDURE — C1713 ANCHOR/SCREW BN/BN,TIS/BN: HCPCS | Performed by: ORTHOPAEDIC SURGERY

## 2023-06-28 PROCEDURE — 36000711: Performed by: ORTHOPAEDIC SURGERY

## 2023-06-28 PROCEDURE — 63600175 PHARM REV CODE 636 W HCPCS: Performed by: NURSE ANESTHETIST, CERTIFIED REGISTERED

## 2023-06-28 PROCEDURE — 25000003 PHARM REV CODE 250: Performed by: NURSE ANESTHETIST, CERTIFIED REGISTERED

## 2023-06-28 PROCEDURE — 71000039 HC RECOVERY, EACH ADD'L HOUR: Performed by: ORTHOPAEDIC SURGERY

## 2023-06-28 PROCEDURE — 27201423 OPTIME MED/SURG SUP & DEVICES STERILE SUPPLY: Performed by: ORTHOPAEDIC SURGERY

## 2023-06-28 PROCEDURE — 64448 NJX AA&/STRD FEM NRV NFS IMG: CPT | Performed by: STUDENT IN AN ORGANIZED HEALTH CARE EDUCATION/TRAINING PROGRAM

## 2023-06-28 PROCEDURE — 36000710: Performed by: ORTHOPAEDIC SURGERY

## 2023-06-28 PROCEDURE — D9220A PRA ANESTHESIA: ICD-10-PCS | Mod: CRNA,,, | Performed by: NURSE ANESTHETIST, CERTIFIED REGISTERED

## 2023-06-28 PROCEDURE — 63600175 PHARM REV CODE 636 W HCPCS: Performed by: PHYSICIAN ASSISTANT

## 2023-06-28 PROCEDURE — 25000003 PHARM REV CODE 250: Performed by: STUDENT IN AN ORGANIZED HEALTH CARE EDUCATION/TRAINING PROGRAM

## 2023-06-28 PROCEDURE — C1751 CATH, INF, PER/CENT/MIDLINE: HCPCS | Performed by: STUDENT IN AN ORGANIZED HEALTH CARE EDUCATION/TRAINING PROGRAM

## 2023-06-28 PROCEDURE — 94761 N-INVAS EAR/PLS OXIMETRY MLT: CPT

## 2023-06-28 PROCEDURE — 27427 RECONSTRUCTION KNEE: CPT | Mod: RT,,, | Performed by: ORTHOPAEDIC SURGERY

## 2023-06-28 PROCEDURE — D9220A PRA ANESTHESIA: Mod: ANES,,, | Performed by: STUDENT IN AN ORGANIZED HEALTH CARE EDUCATION/TRAINING PROGRAM

## 2023-06-28 PROCEDURE — 71000033 HC RECOVERY, INTIAL HOUR: Performed by: ORTHOPAEDIC SURGERY

## 2023-06-28 PROCEDURE — 63600175 PHARM REV CODE 636 W HCPCS: Performed by: STUDENT IN AN ORGANIZED HEALTH CARE EDUCATION/TRAINING PROGRAM

## 2023-06-28 PROCEDURE — 25000003 PHARM REV CODE 250: Performed by: PHYSICIAN ASSISTANT

## 2023-06-28 PROCEDURE — 27427 PR LIGMT REVISION,KNEE,EXTRA-ARTIC: ICD-10-PCS | Mod: RT,,, | Performed by: ORTHOPAEDIC SURGERY

## 2023-06-28 PROCEDURE — D9220A PRA ANESTHESIA: ICD-10-PCS | Mod: ANES,,, | Performed by: STUDENT IN AN ORGANIZED HEALTH CARE EDUCATION/TRAINING PROGRAM

## 2023-06-28 PROCEDURE — 71000016 HC POSTOP RECOV ADDL HR: Performed by: ORTHOPAEDIC SURGERY

## 2023-06-28 PROCEDURE — 64448 ADDUCTOR CANAL CATHETER: ICD-10-PCS | Mod: 59,RT,, | Performed by: STUDENT IN AN ORGANIZED HEALTH CARE EDUCATION/TRAINING PROGRAM

## 2023-06-28 PROCEDURE — D9220A PRA ANESTHESIA: Mod: CRNA,,, | Performed by: NURSE ANESTHETIST, CERTIFIED REGISTERED

## 2023-06-28 PROCEDURE — 37000008 HC ANESTHESIA 1ST 15 MINUTES: Performed by: ORTHOPAEDIC SURGERY

## 2023-06-28 DEVICE — SYS SWIVELOCK ANCH 4.75X19.1MM: Type: IMPLANTABLE DEVICE | Site: KNEE | Status: FUNCTIONAL

## 2023-06-28 DEVICE — ANCHOR SWIVELOCK C BLU FIBERTP: Type: IMPLANTABLE DEVICE | Site: KNEE | Status: FUNCTIONAL

## 2023-06-28 RX ORDER — ONDANSETRON 2 MG/ML
4 INJECTION INTRAMUSCULAR; INTRAVENOUS DAILY PRN
Status: DISCONTINUED | OUTPATIENT
Start: 2023-06-28 | End: 2023-06-28 | Stop reason: HOSPADM

## 2023-06-28 RX ORDER — LIDOCAINE HYDROCHLORIDE 20 MG/ML
INJECTION INTRAVENOUS
Status: DISCONTINUED | OUTPATIENT
Start: 2023-06-28 | End: 2023-06-28

## 2023-06-28 RX ORDER — FENTANYL CITRATE 50 UG/ML
25-200 INJECTION, SOLUTION INTRAMUSCULAR; INTRAVENOUS
Status: DISCONTINUED | OUTPATIENT
Start: 2023-06-28 | End: 2023-06-28 | Stop reason: HOSPADM

## 2023-06-28 RX ORDER — ONDANSETRON 2 MG/ML
INJECTION INTRAMUSCULAR; INTRAVENOUS
Status: DISCONTINUED | OUTPATIENT
Start: 2023-06-28 | End: 2023-06-28

## 2023-06-28 RX ORDER — NEOSTIGMINE METHYLSULFATE 0.5 MG/ML
INJECTION, SOLUTION INTRAVENOUS
Status: DISCONTINUED | OUTPATIENT
Start: 2023-06-28 | End: 2023-06-28

## 2023-06-28 RX ORDER — CELECOXIB 200 MG/1
400 CAPSULE ORAL ONCE
Status: COMPLETED | OUTPATIENT
Start: 2023-06-28 | End: 2023-06-28

## 2023-06-28 RX ORDER — KETAMINE HCL IN 0.9 % NACL 50 MG/5 ML
SYRINGE (ML) INTRAVENOUS
Status: DISCONTINUED | OUTPATIENT
Start: 2023-06-28 | End: 2023-06-28

## 2023-06-28 RX ORDER — ROPIVACAINE HYDROCHLORIDE 2 MG/ML
INJECTION, SOLUTION EPIDURAL; INFILTRATION; PERINEURAL CONTINUOUS
Status: DISCONTINUED | OUTPATIENT
Start: 2023-06-28 | End: 2023-06-28 | Stop reason: HOSPADM

## 2023-06-28 RX ORDER — PROPOFOL 10 MG/ML
VIAL (ML) INTRAVENOUS
Status: DISCONTINUED | OUTPATIENT
Start: 2023-06-28 | End: 2023-06-28

## 2023-06-28 RX ORDER — ROCURONIUM BROMIDE 10 MG/ML
INJECTION, SOLUTION INTRAVENOUS
Status: DISCONTINUED | OUTPATIENT
Start: 2023-06-28 | End: 2023-06-28

## 2023-06-28 RX ORDER — OXYCODONE HYDROCHLORIDE 5 MG/1
10 TABLET ORAL EVERY 4 HOURS PRN
Status: DISCONTINUED | OUTPATIENT
Start: 2023-06-28 | End: 2023-06-28 | Stop reason: HOSPADM

## 2023-06-28 RX ORDER — HYDROMORPHONE HYDROCHLORIDE 1 MG/ML
0.2 INJECTION, SOLUTION INTRAMUSCULAR; INTRAVENOUS; SUBCUTANEOUS EVERY 5 MIN PRN
Status: DISCONTINUED | OUTPATIENT
Start: 2023-06-28 | End: 2023-06-28 | Stop reason: HOSPADM

## 2023-06-28 RX ORDER — ACETAMINOPHEN 500 MG
1000 TABLET ORAL
Status: COMPLETED | OUTPATIENT
Start: 2023-06-28 | End: 2023-06-28

## 2023-06-28 RX ORDER — OXYCODONE HYDROCHLORIDE 5 MG/1
5 TABLET ORAL
Status: DISCONTINUED | OUTPATIENT
Start: 2023-06-28 | End: 2023-06-28 | Stop reason: HOSPADM

## 2023-06-28 RX ORDER — SODIUM CHLORIDE 9 MG/ML
INJECTION, SOLUTION INTRAVENOUS CONTINUOUS
Status: DISCONTINUED | OUTPATIENT
Start: 2023-06-28 | End: 2023-06-28 | Stop reason: HOSPADM

## 2023-06-28 RX ORDER — FENTANYL CITRATE 50 UG/ML
25 INJECTION, SOLUTION INTRAMUSCULAR; INTRAVENOUS EVERY 5 MIN PRN
Status: DISCONTINUED | OUTPATIENT
Start: 2023-06-28 | End: 2023-06-28 | Stop reason: HOSPADM

## 2023-06-28 RX ORDER — DEXAMETHASONE SODIUM PHOSPHATE 4 MG/ML
INJECTION, SOLUTION INTRA-ARTICULAR; INTRALESIONAL; INTRAMUSCULAR; INTRAVENOUS; SOFT TISSUE
Status: DISCONTINUED | OUTPATIENT
Start: 2023-06-28 | End: 2023-06-28

## 2023-06-28 RX ORDER — MIDAZOLAM HYDROCHLORIDE 1 MG/ML
.5-4 INJECTION INTRAMUSCULAR; INTRAVENOUS
Status: DISCONTINUED | OUTPATIENT
Start: 2023-06-28 | End: 2023-06-28 | Stop reason: HOSPADM

## 2023-06-28 RX ORDER — PROMETHAZINE HYDROCHLORIDE 25 MG/1
25 TABLET ORAL EVERY 6 HOURS PRN
Status: DISCONTINUED | OUTPATIENT
Start: 2023-06-28 | End: 2023-06-28 | Stop reason: HOSPADM

## 2023-06-28 RX ORDER — ROPIVACAINE HYDROCHLORIDE 5 MG/ML
INJECTION, SOLUTION EPIDURAL; INFILTRATION; PERINEURAL
Status: COMPLETED | OUTPATIENT
Start: 2023-06-28 | End: 2023-06-28

## 2023-06-28 RX ORDER — EPINEPHRINE 1 MG/ML
INJECTION, SOLUTION, CONCENTRATE INTRAVENOUS
Status: DISCONTINUED | OUTPATIENT
Start: 2023-06-28 | End: 2023-06-28 | Stop reason: HOSPADM

## 2023-06-28 RX ORDER — HALOPERIDOL 5 MG/ML
5 INJECTION INTRAMUSCULAR ONCE
Status: COMPLETED | OUTPATIENT
Start: 2023-06-28 | End: 2023-06-28

## 2023-06-28 RX ORDER — TRAMADOL HYDROCHLORIDE 50 MG/1
100 TABLET ORAL EVERY 6 HOURS PRN
Status: DISCONTINUED | OUTPATIENT
Start: 2023-06-28 | End: 2023-06-28 | Stop reason: HOSPADM

## 2023-06-28 RX ORDER — VANCOMYCIN HYDROCHLORIDE 1 G/20ML
INJECTION, POWDER, LYOPHILIZED, FOR SOLUTION INTRAVENOUS
Status: DISCONTINUED | OUTPATIENT
Start: 2023-06-28 | End: 2023-06-28 | Stop reason: HOSPADM

## 2023-06-28 RX ORDER — ONDANSETRON 2 MG/ML
4 INJECTION INTRAMUSCULAR; INTRAVENOUS EVERY 12 HOURS PRN
Status: DISCONTINUED | OUTPATIENT
Start: 2023-06-28 | End: 2023-06-28 | Stop reason: HOSPADM

## 2023-06-28 RX ORDER — MORPHINE SULFATE 2 MG/ML
2 INJECTION, SOLUTION INTRAMUSCULAR; INTRAVENOUS EVERY 10 MIN PRN
Status: DISCONTINUED | OUTPATIENT
Start: 2023-06-28 | End: 2023-06-28 | Stop reason: HOSPADM

## 2023-06-28 RX ADMIN — NEOSTIGMINE METHYLSULFATE 4 MG: 0.5 INJECTION INTRAVENOUS at 10:06

## 2023-06-28 RX ADMIN — MIDAZOLAM HYDROCHLORIDE 2 MG: 2 INJECTION, SOLUTION INTRAMUSCULAR; INTRAVENOUS at 07:06

## 2023-06-28 RX ADMIN — Medication 20 MG: at 09:06

## 2023-06-28 RX ADMIN — SODIUM CHLORIDE, SODIUM GLUCONATE, SODIUM ACETATE, POTASSIUM CHLORIDE, MAGNESIUM CHLORIDE, SODIUM PHOSPHATE, DIBASIC, AND POTASSIUM PHOSPHATE: .53; .5; .37; .037; .03; .012; .00082 INJECTION, SOLUTION INTRAVENOUS at 10:06

## 2023-06-28 RX ADMIN — HYDROMORPHONE HYDROCHLORIDE 0.2 MG: 1 INJECTION, SOLUTION INTRAMUSCULAR; INTRAVENOUS; SUBCUTANEOUS at 11:06

## 2023-06-28 RX ADMIN — Medication 10 MG: at 11:06

## 2023-06-28 RX ADMIN — ONDANSETRON 4 MG: 2 INJECTION, SOLUTION INTRAMUSCULAR; INTRAVENOUS at 10:06

## 2023-06-28 RX ADMIN — ROPIVACAINE HYDROCHLORIDE 10 ML: 5 INJECTION EPIDURAL; INFILTRATION; PERINEURAL at 08:06

## 2023-06-28 RX ADMIN — PROPOFOL 200 MG: 10 INJECTION, EMULSION INTRAVENOUS at 09:06

## 2023-06-28 RX ADMIN — CEFAZOLIN 2 G: 2 INJECTION, POWDER, FOR SOLUTION INTRAMUSCULAR; INTRAVENOUS at 09:06

## 2023-06-28 RX ADMIN — LIDOCAINE HYDROCHLORIDE 100 MG: 20 INJECTION INTRAVENOUS at 09:06

## 2023-06-28 RX ADMIN — CELECOXIB 400 MG: 200 CAPSULE ORAL at 07:06

## 2023-06-28 RX ADMIN — ROCURONIUM BROMIDE 50 MG: 10 INJECTION INTRAVENOUS at 09:06

## 2023-06-28 RX ADMIN — HALOPERIDOL LACTATE 5 MG: 5 INJECTION, SOLUTION INTRAMUSCULAR at 01:06

## 2023-06-28 RX ADMIN — SODIUM CHLORIDE: 0.9 INJECTION, SOLUTION INTRAVENOUS at 08:06

## 2023-06-28 RX ADMIN — DEXAMETHASONE SODIUM PHOSPHATE 8 MG: 4 INJECTION, SOLUTION INTRAMUSCULAR; INTRAVENOUS at 09:06

## 2023-06-28 RX ADMIN — HYDROMORPHONE HYDROCHLORIDE 0.2 MG: 1 INJECTION, SOLUTION INTRAMUSCULAR; INTRAVENOUS; SUBCUTANEOUS at 12:06

## 2023-06-28 RX ADMIN — GLYCOPYRROLATE 0.4 MG: 0.2 INJECTION, SOLUTION INTRAMUSCULAR; INTRAVENOUS at 10:06

## 2023-06-28 RX ADMIN — ACETAMINOPHEN 1000 MG: 500 TABLET ORAL at 07:06

## 2023-06-28 RX ADMIN — OXYCODONE HYDROCHLORIDE 5 MG: 5 TABLET ORAL at 11:06

## 2023-06-28 RX ADMIN — FENTANYL CITRATE 100 MCG: 50 INJECTION INTRAMUSCULAR; INTRAVENOUS at 07:06

## 2023-06-28 RX ADMIN — Medication: at 11:06

## 2023-06-28 NOTE — PATIENT INSTRUCTIONS
[address], [place]                                                                                          [phone]                   Postoperative Instructions for Knee Surgery                 Your Surgery Included:   Open               Arthroscopic   [x] Ligament Repair       [] Diagnostic           [] ACL   []PCL     [x] MCL     [] PLLC      [] Synovectomy / Plica Removal [] Meniscal Cartilage Repair / Transplantation      [] Lysis of Adhesions / Manipulation [] Articular Cartilage Repair      [] Interval Release           [] Microfracture       [] OATS   [] ACI      [] Meniscectomy           [] Osteochondral Allograft      [] Meniscal Cartilage Repair  [] Patellar Realignment       [] Debridement / Chondroplasty         [] Lateral Release   [] Ligament Repair      [] Articular Cartilage Repair          [] Extensor Mechanism             []   Microfracture  []  OATS         []  Cartilage Biopsy [] Tendon Repair          [] Ligament Reconstruction          [] Patella                  [] Quadriceps             []   ACL    []   PCL  [] High Tibial Osteotomy       [] PRP Arthrocentesis  [] Joint Replacement         [] Amniox Arthrocentesis           [] Unicompartmental   [] Patellofemoral                    [] Total Knee                  Call our office ([phone]) immediately if you experience any of the following:       Excessive bleeding or pus like drainage at the incision site       Uncontrollable pain not relieved by pain medication       Excessive swelling or redness at the incision site       Fever above 101.5 degrees not controlled with Tylenol or Motrin       Shortness of Breath       Any foul odor or blistering from the surgery site    FOR EMERGENCIES: If any unusual problems or difficulties occur, call our office at [phone], or page the  at [phone] who will direct your call appropriately    1.   Pain Management: A cold therapy cuff, pain  medications, local injections, and in some cases, regional anesthesia injections are used to manage your post-operative pain. The decision to use each of these options is based on their risks and benefits.     Medications: You were given one or more of the following medication prescriptions before leaving the hospital. Have the prescriptions filled at a pharmacy on your way home and follow the instructions on the bottles. If you need a refill, please call your pharmacy.      Narcotic Medication (usually Norco/Hydrocodone APAP, Percocet/Oxycodone APAP, Roxicodone, or Tramadol): Begin taking the medication before your knee starts to hurt. Some patients do not like to take any medication, but if you wait until your pain is severe before taking, you will be very uncomfortable for several hours waiting for the narcotic to work. Always take with food.     Nausea / Vomiting: For this issue, we prescribe Phenergan, use this medication as directed.     Cold Therapy: You may have been sent home with a [facility] cold therapy unit and wrap for your knee. Fill with ice and water, or frozen water bottles with water, to the indicated fill line and use throughout the day for the first two days and then as needed to help relieve pain and control swelling. Ice the knee in 30 minute intervals, and do not place the wrap directly onto the skin.     Regional Anesthesia Injections (Blocks): You may have been given a regional nerve block either before or after surgery. This may make your entire leg numb for 24-36 hours.                            * Proceed with caution when bearing weight on your leg.     2.   Diet: Eat a bland diet for the first day after surgery. Progress your diet as tolerated. Constipation may occur with Narcotic usage, contact our office if you are experiencing constipation.    3.   Activity: Limit your activity during the first 48 hours, keep your leg elevated with pillows under your heel. After the first 48 hours  at home, increase your activity level based on your symptoms.    4.   Dressing Change: Remove the soft dressing on the 3rd day. IF YOU HAVE A TAN AQUACEL BANDAGE ON YOUR KNEE, DO NOT REMOVE THIS. It is normal for some blood to be seen on the dressings. It is also normal for you to see apparent bruising on the skin around your knee when you remove the dressing. If present, leave the steri-strip tape across the incisions. If you are concerned by the drainage or the appearance of your knee, please call one of the numbers listed below.    5.   Showering: You may shower on the 3rd day after surgery with waterproof bandages over small incisions. If you have an incision with Prineo (clear mesh), it does not need to be covered. Do not submerge in any water until after your postoperative appointment in clinic.    6.   Knee Brace: You may have been sent home in a hinged knee brace. Your brace is set at 0 degrees of motion. Wear the brace for 6 weeks, LOCKED in full extension when walking, you will need to wear this brace at all time unless instructed otherwise. You may unlock at rest or for exercises.    7.   Your procedure did not require a Continuous Passive Motion (CPM) device.    8.   Weight Bearing: You may have been sent home with crutches. If instructed (see below), use these crutches at all times unless at complete rest.      [x] Full weight bearing as tolerated           [x]  NOW        9.  Knee Exercises: Begin these exercises the first day after surgery in order to help you regain your motion and strength. You may do the following marked exercises:     [x] Quad Sets - Begin activating your quadriceps muscle by driving your          knee downward into full knee extension while seated on a table or bed   with a towel rolled and propped under your heel     [x] Straight Leg Raise (SLR) - While maame your quadriceps muscle, lift     your fully extended leg to the level of your non-operative knee (as shown)     []  "Heel Slides - With the knee straight, slide your heel slowly toward your   buttocks, hold at the endpoint for 10-15 seconds, then slowly straighten     [x] Ankle pumps - With your knee straight, move your ankle in a "pumping"    fashion to activate your calf and leg muscles      10.  Physical Therapy: Physical therapy is an essential component to your recovery from surgery. Your physical therapy will start in 2 days.    FIRST POSTOPERATIVE VISIT: As scheduled.       "

## 2023-06-28 NOTE — OP NOTE
[facility])  Surgery Department  Operative Note    SUMMARY     Date of Procedure: 6/28/2023     Procedure: Procedure(s) (LRB):  REPAIR, LIGAMENT (Right)  ARTHROSCOPY, KNEE (Right)     Surgeon(s) and Role:     * Lamine Early MD - Primary    Assisting Surgeon:   MD Timi Munroe PA-C     Pre-Operative Diagnosis: Tear of medial collateral ligament of right knee, initial encounter [S83.411A]  Chondromalacia of right knee [M94.261]    Post-Operative Diagnosis: Post-Op Diagnosis Codes:     * Tear of medial collateral ligament of right knee, initial encounter [S83.411A]     * Chondromalacia of right knee [M94.261]    Anesthesia: * No anesthesia type entered *    Technical Procedures Used:     DATE OF PROCEDURE: 6/28/2023    PREOPERATIVE DIAGNOSES:   1. Right knee high grade medial collateral ligament (MCL) tear.     POSTOPERATIVE DIAGNOSES:   1. Right knee high grade medial collateral ligament (MCL) tear.     PROCEDURES:   1. Right knee MCL repair with internal brace  2. Diagnostic right knee arthroscopy      SURGEON: Lamine Early M.D.     ASSISTANT:   MD Timi Munroe PA-C     COMPLICATIONS: None.     POSITION: Supine with stress post.     ANESTHESIA: General endotracheal plus block.     COMPLICATIONS: None.     TOURNIQUET TIME:  30 minutes    EBL:  Minimal    EXAMINATION UNDER ANESTHESIA:   Knee: full range of motion, 2+ opening to valgus stress with no endpoint. No opening to valgus stress in extension.  Negative Lachman, Negative pivot shift.     ARTHROSCOPIC FINDINGS:   1. Intact medial meniscus.   2. Intact lateral meniscus  3. Intact ACL, PCL and cartilage surfaces.    IMPLANTS:  Arthrex Internal MCL brace with 4.75 mm SwivelLock anchors x 2 and Internal Brace Tape    INDICATIONS: The patient is a 42 y.o.-year-old female with a history of right knee pain following a valgus knee injury while standing on a water slide. MRI confirms a grade 3 tear in her  medial collateral ligament (MCL).  On exam her knee opened up to address with no endpoint.  Assuming wrist and benefits patient including attempted nonoperative care, patient felt unstable and desired surgical repair.  Because of the high-grade injury to her MCL we discussed primary repair with augmentation with an internal brace.  We discussed the risks of this including over constraining in the knee, stiffness and the risk of surgery.  After considering her options she wished to proceed with surgery.  After the risks and benefits of surgery were discussed with the patient, including bleeding, infection, scarring, persistent pain, risk of blood clots (DVT), pulmonary embolism, compartment syndrome, damage to cartilage, damage to neurovascular structures, plus the risks of anesthesia, including, death, stroke, and heart attack, the patient wished to proceed with operative intervention.      DESCRIPTION OF PROCEDURE:     The patient and correct limb were identified and marked in the pre-op holding area.     The patient was brought in the room. After undergoing general endotracheal anesthesia,  she was placed on a well-padded operating table. her right lower extremity was prepped and draped in usual sterile fashion. A nonsterile tourniquet was placed high up around the thigh and the patient was placed supine on the table with a lateral stress post was care to pad all bony prominences during the case.      The standard inferolateral and inferomedial portals were created. Diagnostic arthroscopy performed.     The patellofemoral joint was entered. There was no significant chondromalacia on the trochlea or on the undersurface of the patella.    There was a mild synovitis noted within the anterior interval. An VAPR device was used to remove areas of irritating synovium from the overlying trochlea in the anterior interval to allow for visualization to minimize abrasion.    Attention turned to the medial compartment. Medial  femoral condyle was intact. The medial meniscus was probed and found to be stable and was left alone.    Attention was turned to the intercondylar notch. The ACL and PCL were intact.     Attention was turned to the lateral compartment. The lateral meniscus was intact and the lateral femoral condyle and lateral tibial plateau were intact.      MCL REPAIR WITH INTERNAL BRACE:  Attention was turned to our MCL repair.  After confirming at the knee had 2+ opening with no endpoint in 30° of flexion, which was different from her contralateral side which had a solid endpoint, a decision was made to proceed with this was a high-grade midsubstance tear towards the femoral side.  The decision was made to perform an augmented repair with an Arthrex internal brace to provide stability to assist and the tensioning of her MCL as healed.  Because the location of injury this was judged to be small to repair through small targeted incision over the femoral and tibial insertion point.  Some flexion, directly over the bony landmarks of her medial epicondyle and a site approximately 6 cm distal to the medial portion or tibial plateau.  Esmarch exsanguinated the limb the tourniquet was elevated to 250 mmHg and was kept elevated for approximately 30 minutes for this portion of the case.  Tension under the femoral approach 1st.  Skin incision was made through with a 15 blade scalpel.  Excellent hemostasis was achieved.  Full-thickness skin flaps were developed.  Distal portion of the VMO complex was identified.  The fascial layer was released to in the area of the femoral origin of the MCL.  This fascial layer was elevated up.  The femoral insertion of the MCL and the medial epicondyle were clearly identified.  An area proximally 3 mm posterior to the condyle was identified as an appropriate starting spot for our 4.75 mm SwiveLock anchor for our internal brace.  This area was cleared of soft tissue and a guidewire followed by a drill was  used to give us a 20 mm socket.  Sequential tapping get our socket for the SwiveLock One 4.75 mm SwiveLock preloaded with the internal brace was inserted without difficulty down into the correct location, somewhat countersunk.  Internal brace was shuttled through a deep layer of the residual MCL so that it would lay directly on top of the residual is MCL in the area under the fascial attachments to be tunneled distally was cleared off.      Attention was then turned to the 2 cm incision over the tibial insertion point.  This was made down through skin and subcutaneous tissues in a vertical manner the knee at 90°.  After full-thickness skin flaps were developed.  The pes tendons could be identified.  Airflow attention was paid to make our tibial insertion site proximal to our pes so as to not disrupt this.  This was directly visualized and a guidewire needed drill bit was inserted to prevent soft tissue entrapment a 20 mm socket was drilled the removed and sequential taps were used to give us the appropriate spot for our anchor.  The final tap was left in place.  The placed underneath our fascial layer from distal to proximal.  The internal brace Arthrex tapes were delivered in the correct plane to the distal insertion site of our MCL.  These were held in the position of our socket and the knee was taken through a range of motion to confirm that this was the isometric point.  Once this was done the distal 4.75 mm SwiveLock was loaded with the tapes from the proximal anchor with careful attention to not over brain the repair.  Tapes were inserted at an area from the and to us not to over tightness.  A curved hemostat was placed proximally to also ensure that no over tightening was achieved.  Once the anchor was in good position tension and the anchor was inserted with the hemostat underneath the proximal ends to further ensure that no over constraint was achieved.  Once this was done the knee was evaluated and had  full extension full flexion and had a stable MCL with a 1+ opening and a solid endpoint to valgus stress at 30° of flexion and no opening to valgus stress in full extension.    The wounds were copiously irrigated.  The skin closed with 3-0 Vicryl and 4-0 Monocryl.    Portal sites were closed with 4-0 Monocryl, covered with Mastisol, Steri-Strips, Xeroform, 4 x 4 fluffs, ABD pads and thigh-high CHARLIE hose.    The patient was extubated in the room, transferred to Recovery Room in stable condition accompanied by her physician.  There were no complications in the case.     I was present, scrubbed and did perform all critical portions of the case.    The postop plan for the patient is to follow our MCL repair protocol.  Hinged knee brace locked in extension weightbear as tolerated for now.    KAREN COY MD     Description of the Findings of the Procedure: above    Significant Surgical Tasks Conducted by the Assistant(s), if Applicable: none    Complications: No    Estimated Blood Loss (EBL): * No values recorded between 6/28/2023  9:47 AM and 6/28/2023 11:14 AM *           Implants:   Implant Name Type Inv. Item Serial No.  Lot No. LRB No. Used Action   SYS SWIVELOCK ANCH 4.75X19.1MM - [id number]  SYS SWIVELOCK ANCH 4.75X19.1MM  [employer] [id number] Right 1 Implanted   ANCHOR SWIVELOCK C ALVARO FIBERTP - [id number]  ANCHOR SWIVELOCK C ALVARO FIBERTP  [employer] [id number] Right 1 Implanted       Specimens:   Specimen (24h ago, onward)      None                    Condition: Good    Disposition: PACU - hemodynamically stable.    Attestation: I was present and scrubbed for the entire procedure.    Discharge Note    SUMMARY     Admit Date: 6/28/2023    Discharge Date and Time:  06/28/2023 12:44 PM    Hospital Course (synopsis of major diagnoses, care, treatment, and services provided during the course of the hospital stay): outpatient surgery     Final Diagnosis: Post-Op Diagnosis Codes:     * Tear of medial collateral  ligament of right knee, initial encounter [S83.411A]     * Chondromalacia of right knee [M94.261]    Disposition: Home or Self Care    Follow Up/Patient Instructions:     Medications:  Reconciled Home Medications:      Medication List        CONTINUE taking these medications      aspirin 81 MG Chew  Chew and swallow 1 tablet (81 mg total) by mouth 2 (two) times a day. for 14 days     ibuprofen 800 MG tablet  Commonly known as: ADVIL,MOTRIN  Take 1 tablet (800 mg total) by mouth 3 (three) times daily as needed for Pain.     ondansetron 4 MG tablet  Commonly known as: ZOFRAN  Take 1 tablet (4 mg total) by mouth every 8 (eight) hours as needed for Nausea.     oxyCODONE 5 MG immediate release tablet  Commonly known as: ROXICODONE  Take 1 tablet (5 mg total) by mouth every 6 (six) hours as needed for Pain.     traMADoL 50 mg tablet  Commonly known as: ULTRAM  Take 1 tablet (50 mg total) by mouth every 6 (six) hours as needed for Pain.            Discharge Procedure Orders   Diet general     Call MD for:  temperature >100.4     Call MD for:  persistent nausea and vomiting     Call MD for:  severe uncontrolled pain     Call MD for:  difficulty breathing, headache or visual disturbances     Call MD for:  redness, tenderness, or signs of infection (pain, swelling, redness, odor or green/yellow discharge around incision site)     Call MD for:  hives     Call MD for:  persistent dizziness or light-headedness

## 2023-06-28 NOTE — PROGRESS NOTES
Patient complaining of nausea while in the restroom. Dr. Andrew notified. Orders in place and carried out as directed. Patient became drowsy post administration but still able to carry conversation and follow commands. Vital signs stable and no complaints of nausea. Dr. Andrew called to bedside to assess patient. Per MD ok to DC home. Mother made aware that patient may be drowsy at home due to nausea medication. Mother confirmed that she would have help getting patient into the house when they arrive home.

## 2023-06-28 NOTE — ANESTHESIA PREPROCEDURE EVALUATION
2023  Pre-operative evaluation for Procedure(s) (LRB):  ARTHROSCOPY, KNEE MCL LIGAMENT RESCONSTRUCTION (Right)  RECONSTRUCTION, LIGAMENT (Right)    Tato Godinez is a 42 y.o. female     Patient Active Problem List   Diagnosis    Acute labyrinthitis, unspecified laterality    Right shoulder pain       Review of patient's allergies indicates:  No Known Allergies    No current facility-administered medications on file prior to encounter.     Current Outpatient Medications on File Prior to Encounter   Medication Sig Dispense Refill    ibuprofen (ADVIL,MOTRIN) 800 MG tablet Take 1 tablet (800 mg total) by mouth 3 (three) times daily as needed for Pain. (Patient not taking: Reported on 2023) 60 tablet 1       Past Surgical History:   Procedure Laterality Date     SECTION      HAND SURGERY         Social History     Socioeconomic History    Marital status:    Tobacco Use    Smoking status: Never    Smokeless tobacco: Never   Substance and Sexual Activity    Alcohol use: Yes     Comment: socially    Drug use: No    Sexual activity: Yes     Partners: Male     Birth control/protection: Condom         CBC: No results for input(s): WBC, RBC, HGB, HCT, PLT, MCV, MCH, MCHC in the last 72 hours.    CMP: No results for input(s): NA, K, CL, CO2, BUN, CREATININE, GLU, MG, PHOS, CALCIUM, ALBUMIN, PROT, ALKPHOS, ALT, AST, BILITOT in the last 72 hours.    INR  No results for input(s): PT, INR, PROTIME, APTT in the last 72 hours.        Diagnostic Studies:      EKD Echo:  No results found for this or any previous visit.      Pre-op Assessment    I have reviewed the Patient Summary Reports.     I have reviewed the Nursing Notes. I have reviewed the NPO Status.   I have reviewed the Medications.     Review of Systems      Physical Exam  General: Well nourished and  Cooperative    Airway:  Mallampati: II   Mouth Opening: Normal  TM Distance: Normal  Tongue: Normal  Neck ROM: Normal ROM    Chest/Lungs:  Clear to auscultation, Normal Respiratory Rate    Heart:  Rate: Normal  Rhythm: Regular Rhythm  Sounds: Normal        Anesthesia Plan  Type of Anesthesia, risks & benefits discussed:    Anesthesia Type: Gen ETT, Gen Supraglottic Airway  Intra-op Monitoring Plan: Standard ASA Monitors  Post Op Pain Control Plan: multimodal analgesia and IV/PO Opioids PRN  Induction:  IV  Airway Plan: Direct and Video, Post-Induction  Informed Consent: Informed consent signed with the Patient and all parties understand the risks and agree with anesthesia plan.  All questions answered.   ASA Score: 1    Ready For Surgery From Anesthesia Perspective.     .

## 2023-06-28 NOTE — ANESTHESIA PROCEDURE NOTES
Adductor canal catheter    Patient location during procedure: pre-op   Block not for primary anesthetic.  Reason for block: at surgeon's request and post-op pain management   Post-op Pain Location: right knee   Start time: 6/28/2023 8:21 AM  Timeout: 6/28/2023 8:20 AM   End time: 6/28/2023 8:30 AM    Staffing  Authorizing Provider: Maurizio Andrew MD  Performing Provider: Maurizio Andrew MD    Preanesthetic Checklist  Completed: patient identified, IV checked, site marked, risks and benefits discussed, surgical consent, monitors and equipment checked, pre-op evaluation and timeout performed  Peripheral Block  Patient position: supine  Prep: ChloraPrep and site prepped and draped  Patient monitoring: heart rate, cardiac monitor, continuous pulse ox, continuous capnometry and frequent blood pressure checks  Block type: adductor canal  Laterality: right  Injection technique: continuous  Needle  Needle type: Tuohy   Needle gauge: 17 G  Needle length: 3.5 in  Needle localization: anatomical landmarks and ultrasound guidance  Catheter type: spring wound  Catheter size: 19 G  Test dose: lidocaine 1.5% with Epi 1-to-200,000 and negative   -ultrasound image captured on disc.  Assessment  Injection assessment: negative aspiration, negative parasthesia and local visualized surrounding nerve  Paresthesia pain: none  Heart rate change: no  Slow fractionated injection: yes  Pain Tolerance: comfortable throughout block and no complaints  Medications:    Medications: ropivacaine (NAROPIN) injection 0.5% - Perineural   10 mL - 6/28/2023 8:25:00 AM    Additional Notes  VSS.  [facility] RN monitoring vitals throughout procedure.  Patient tolerated procedure well.     20cc 0.25% ropi given

## 2023-06-28 NOTE — ADDENDUM NOTE
Addendum  created 06/28/23 1324 by Maurizio Andrew MD    Order list changed, Pharmacy for encounter modified

## 2023-06-28 NOTE — PLAN OF CARE
Poc reviewed with pt, verbalized understanding. Questions answered, reassurance provided.     Belongings to locker, including crutches. Crutches labeled with pt label    Need update HP, brooklynn consent, site aga

## 2023-06-28 NOTE — ANESTHESIA POSTPROCEDURE EVALUATION
Anesthesia Post Evaluation    Patient: Tato Godinez    Procedure(s) Performed: Procedure(s) (LRB):  REPAIR, LIGAMENT (Right)  ARTHROSCOPY, KNEE (Right)    Final Anesthesia Type: general      Patient location during evaluation: PACU  Patient participation: Yes- Able to Participate  Level of consciousness: awake and alert  Post-procedure vital signs: reviewed and stable  Pain management: adequate  Airway patency: patent  MARSHALL mitigation strategies: Multimodal analgesia  PONV status at discharge: No PONV  Anesthetic complications: no      Cardiovascular status: blood pressure returned to baseline and hemodynamically stable  Respiratory status: unassisted  Hydration status: euvolemic  Follow-up not needed.          Vitals Value Taken Time   BP 99/50 06/28/23 1117   Temp 37 06/28/23 1121   Pulse 89 06/28/23 1121   Resp 14 06/28/23 1121   SpO2 99 % 06/28/23 1121   Vitals shown include unvalidated device data.      No case tracking events are documented in the log.      Pain/Nav Score: Pain Rating Prior to Med Admin: 3 (6/28/2023  7:47 AM)  Nav Score: 5 (6/28/2023 11:14 AM)

## 2023-06-28 NOTE — OPERATIVE NOTE ADDENDUM
Certification of Assistant at Surgery       Surgery Date: 6/28/2023     Participating Surgeons:  Surgeon(s) and Role:     * Lamine Early MD - Primary    Procedures:  Procedure(s) (LRB):  REPAIR, LIGAMENT (Right)  ARTHROSCOPY, KNEE (Right)    Assistant Surgeon's Certification of Necessity:  I understand that section 1842 (b) (6) (d) of the Social Security Act generally prohibits Medicare Part B reasonable charge payment for the services of assistants at surgery in teaching hospitals when qualified residents are available to furnish such services. I certify that the services for which payment is claimed were medically necessary, and that no qualified resident was available to perform the services. I further understand that these services are subject to post-payment review by the Medicare carrier.      Reji Aguilar MD    06/28/2023  11:19 AM

## 2023-06-28 NOTE — ANESTHESIA PROCEDURE NOTES
Intubation    Date/Time: 6/28/2023 9:25 AM  Performed by: Lin Mccall CRNA  Authorized by: Maurizio Andrew MD     Intubation:     Induction:  Intravenous    Intubated:  Postinduction    Mask Ventilation:  Easy mask    Attempts:  1    Attempted By:  CRNA    Method of Intubation:  Video laryngoscopy    Blade:  Diego 3    Laryngeal View Grade: Grade I - full view of cords      Difficult Airway Encountered?: No      Complications:  None    Airway Device:  Oral endotracheal tube    Airway Device Size:  7.0    Style/Cuff Inflation:  Cuffed    Inflation Amount (mL):  6    Tube secured:  19    Secured at:  The lips    Placement Verified By:  Capnometry    Complicating Factors:  None    Findings Post-Intubation:  BS equal bilateral

## 2023-06-28 NOTE — CARE UPDATE
0821 pt stated she felt flushed. Denied NV or HA. Visibly diaphoretic, cold rag applied, IV fluids opened, pt laid flat. VSS    0827 pt states she is feeling better. VSS

## 2023-06-28 NOTE — TRANSFER OF CARE
"Anesthesia Transfer of Care Note    Patient: Tato Godinez    Procedure(s) Performed: Procedure(s) (LRB):  REPAIR, LIGAMENT (Right)  ARTHROSCOPY, KNEE (Right)    Patient location: PACU    Anesthesia Type: general    Transport from OR: Transported from OR on 6-10 L/min O2 by face mask with adequate spontaneous ventilation    Post pain: adequate analgesia    Post assessment: no apparent anesthetic complications    Post vital signs: stable    Level of consciousness: sedated    Nausea/Vomiting: no nausea/vomiting    Complications: none    Transfer of care protocol was followed      Last vitals:   Visit Vitals  /62 (BP Location: Right arm, Patient Position: Lying)   Pulse 76   Temp 36.8 °C (98.3 °F) (Oral)   Resp 15   Ht 5' 2" (1.575 m)   Wt 83.5 kg (184 lb)   SpO2 100%   Breastfeeding No   BMI 33.65 kg/m²     "

## 2023-06-28 NOTE — PLAN OF CARE
VSS. Pt able to tolerate oral liquids. Pt reports tolerable pain level for D/C. Ice pack and dressing intact. Thigh TEDs intact. Mom received home meds per bedside delivery. Crutches at bedside for home use. No distress noted. Pt ready for D/C. D/C and Nimbus instructions reviewed with pt and mom, verbalized understanding.

## 2023-06-28 NOTE — BRIEF OP NOTE
[facility] - [facility] (Hospital)  Brief Operative Note    Surgery Date: 6/28/2023     Surgeon(s) and Role:     * Lamine Early MD - Primary    Assisting Surgeon: None    Pre-op Diagnosis:  Tear of medial collateral ligament of right knee, initial encounter [S83.411A]  Chondromalacia of right knee [M94.261]    Post-op Diagnosis:  Post-Op Diagnosis Codes:     * Tear of medial collateral ligament of right knee, initial encounter [S83.411A]     * Chondromalacia of right knee [M94.261]    Procedure(s) (LRB):  REPAIR, LIGAMENT (Right)  ARTHROSCOPY, KNEE (Right)    Anesthesia: * No anesthesia type entered *    Operative Findings: see op note    Estimated Blood Loss: * No values recorded between 6/28/2023  9:47 AM and 6/28/2023 11:14 AM *         Specimens:   Specimen (24h ago, onward)      None              Discharge Note    OUTCOME: Patient tolerated treatment/procedure well without complication and is now ready for discharge.    DISPOSITION: Home or Self Care    FINAL DIAGNOSIS:  <principal problem not specified>    FOLLOWUP: In clinic    DISCHARGE INSTRUCTIONS:  No discharge procedures on file.

## 2023-06-29 ENCOUNTER — PATIENT MESSAGE (OUTPATIENT)
Dept: SPORTS MEDICINE | Facility: CLINIC | Age: 43
End: 2023-06-29
Payer: COMMERCIAL

## 2023-06-29 VITALS
RESPIRATION RATE: 15 BRPM | OXYGEN SATURATION: 97 % | BODY MASS INDEX: 33.86 KG/M2 | DIASTOLIC BLOOD PRESSURE: 59 MMHG | HEART RATE: 77 BPM | WEIGHT: 184 LBS | HEIGHT: 62 IN | SYSTOLIC BLOOD PRESSURE: 102 MMHG | TEMPERATURE: 98 F

## 2023-06-30 ENCOUNTER — CLINICAL SUPPORT (OUTPATIENT)
Dept: REHABILITATION | Facility: HOSPITAL | Age: 43
End: 2023-06-30
Payer: COMMERCIAL

## 2023-06-30 DIAGNOSIS — S83.411A TEAR OF MEDIAL COLLATERAL LIGAMENT OF RIGHT KNEE, INITIAL ENCOUNTER: ICD-10-CM

## 2023-06-30 DIAGNOSIS — M25.561 ACUTE PAIN OF RIGHT KNEE: ICD-10-CM

## 2023-06-30 PROCEDURE — 97110 THERAPEUTIC EXERCISES: CPT | Performed by: PHYSICAL THERAPIST

## 2023-06-30 PROCEDURE — 97112 NEUROMUSCULAR REEDUCATION: CPT | Performed by: PHYSICAL THERAPIST

## 2023-06-30 PROCEDURE — 97161 PT EVAL LOW COMPLEX 20 MIN: CPT | Performed by: PHYSICAL THERAPIST

## 2023-06-30 NOTE — PLAN OF CARE
[facility]   Physical Therapy Initial Evaluation      Name: Tato Godinez  Clinic Number: [id number]    Therapy Diagnosis:   Encounter Diagnoses   Name Primary?    Tear of medial collateral ligament of right knee, initial encounter     Acute pain of right knee         Physician: Lamine Early MD    Physician Orders: PT Eval and Treat   Medical Diagnosis from Referral: 3.411A (ICD-10-CM) - Tear of medial collateral ligament of right knee, initial encounter  Evaluation Date: 6/30/2023  Authorization Period Expiration: 7/30/2023  Plan of Care Expiration: 9/30/2023  Progress Note Due: 8/10/2023  Visit # / Visits authorized: 1/ 1   FOTO: 1/3    Precautions: Standard,   DOS 6/28/2023  PROCEDURES:   1. Right knee MCL repair with internal brace  2. Diagnostic right knee arthroscopy    The postop plan for the patient is to follow our MCL repair protocol.  Hinged knee brace locked in extension weightbear as tolerated for now.        Time In: 0900  Time Out: 1020  Total Appointment Time (timed & untimed codes): 60 minutes    Subjective     Date of onset: 6/28/2023    History of current condition - Tato reports: Injured knee on a slip and slide when a kid slid down into her knee. Had surgery 2 days ago and is feeling OK overall. Denies significant pain or adverse reactions. Has been wearing brace as prescribed and is lightly putting weight on her leg.    Falls: No    Prior Therapy: Yes  Social History: Lives home with children, mother staying with her to help   Occupation: Nurse  Prior Level of Function: Unable to ambulate without AD  Current Level of Function: Independent in all activities    Pain:  Current 3/10, worst 5/10, best 0/10   Location: right knee    Description: Aching  Aggravating Factors: movement  Easing Factors: rest    Patients goals: Return to work in August     Medical History:   No past medical history on file.    Surgical History:   Tato Godinez  has a past  "surgical history that includes Hand surgery and  section.    Medications:   Tato KITCHEN has a current medication list which includes the following prescription(s): aspirin, ibuprofen, ondansetron, oxycodone, and tramadol.    Allergies:   Review of patient's allergies indicates:  No Known Allergies     Objective      Observation: Presents in wheel chair with TSCOPE brace locked in extension, post op bandaging and CHARLIE hose still in place.     Gait: Bilateral axillary crutches with TTWB.       Knee Passive Range of Motion:   Right  Left    Flexion 40 130   Extension +3 0       Ankle Active Range of Motion: WNL      Quad Set: Fair      Joint Mobility: Hypermobile patellar glide       Palpation: Hyper sensitive to light touch      Sensation: Intact to light touch        Special Tests: Not performed today due to post-op status   Strength: Not performed today due to post-op status.      Intake Outcome Measure for FOTO Knee Survey    Therapist reviewed FOTO scores for Tato Godinez on 2023.   FOTO documents entered into [employer] - see Media section.    Intake Score: 29%         Treatment     Total Treatment time (time-based codes) separate from Evaluation: 16 minutes     Tato received the treatments listed below:      therapeutic exercises to develop ROM, strength, flexibility, endurance for 8 minutes including:  Heel prop 5'  Heel slide to 40 degrees x 30    manual therapy techniques: joint mobilizations and/or soft tissue mobilizations were applied to the: knee for 00 minutes, including:      neuromuscular re-education activities to improve: motor control, balance, muscle activation, proprioception, posture for 8 minutes. The following activities were included:  Quad set, 10 x 10"  Education on HEP    Patient Education and Home Exercises     Education provided:   - Diagnosis and prognosis    Written Home Exercises Provided: yes. Exercises were reviewed and Tato was able to demonstrate them prior to the end " of the session.  Tato demonstrated good  understanding of the education provided. See EMR under Patient Instructions for exercises provided during therapy sessions.    Assessment     Tato KITCHEN is a 42 y.o. female referred to outpatient Physical Therapy with a medical diagnosis of s/p R MCL reconstruction. Patient presents with typical day 2 post op symptoms. Quad set is fair. Extension is good. Gave a small heel prop with just a tiny towel to avoid excessive hyper extension. Most limitations in flexion but this should improve.     Patient prognosis is Excellent.   Patient will benefit from skilled outpatient Physical Therapy to address the deficits stated above and in the chart below, provide patient /family education, and to maximize patientt's level of independence.     Plan of care discussed with patient: Yes  Patient's spiritual, cultural and educational needs considered and patient is agreeable to the plan of care and goals as stated below:     Anticipated Barriers for therapy: None    Medical Necessity is demonstrated by the following  History  Co-morbidities and personal factors that may impact the plan of care [x] LOW: no personal factors / co-morbidities  [] MODERATE: 1-2 personal factors / co-morbidities  [] HIGH: 3+ personal factors / co-morbidities    Moderate / High Support Documentation:   Co-morbidities affecting plan of care:     Personal Factors:   no deficits     Examination  Body Structures and Functions, activity limitations and participation restrictions that may impact the plan of care [x] LOW: addressing 1-2 elements  [] MODERATE: 3+ elements  [] HIGH: 4+ elements (please support below)    Moderate / High Support Documentation:      Clinical Presentation [x] LOW: stable  [] MODERATE: Evolving  [] HIGH: Unstable     Decision Making/ Complexity Score: low       GOALS: Short Term Goals:  6 weeks  1.Report decreased knee pain  < / =  3/10  to increase tolerance for exercise  2. Increase knee  ROM to 125 degrees flexion in order to be able to perform ADLs without difficulty.  3. Increase strength by 1/3 MMT grade in quadriceps  to increase tolerance for ADL and work activities.  4. Pt to tolerate HEP to improve ROM and independence with ADL's    Long Term Goals: 24 weeks  1.Report decreased knee pain < / = 0/10  to increase tolerance for ADLs  2.Patient goal: Return to work in August  3.Increase strength to >/= 4+/5 in quadriceps  to increase tolerance for ADL and work activities.  4. Pt will report at CJ level (20-40% impaired) on FOTO knee to demonstrate increase in LE function with every day tasks.    Plan     Plan of care Certification: 6/30/2023 to 9/30/2023.    Outpatient Physical Therapy 2 times weekly for 10 weeks to include the following interventions: manual therapy, therapeutic exercise, therapeutic activities, and neuromuscular re-education.    Gal Brna, PT, DPT  Board Certified in Orthopedic Physical Therapy  Fellow, [employer] Orth[employer] Manual Physical Therapists

## 2023-07-03 ENCOUNTER — CLINICAL SUPPORT (OUTPATIENT)
Dept: REHABILITATION | Facility: HOSPITAL | Age: 43
End: 2023-07-03
Payer: COMMERCIAL

## 2023-07-03 DIAGNOSIS — M25.561 ACUTE PAIN OF RIGHT KNEE: Primary | ICD-10-CM

## 2023-07-03 PROCEDURE — 97112 NEUROMUSCULAR REEDUCATION: CPT | Performed by: PHYSICAL THERAPIST

## 2023-07-03 PROCEDURE — 97140 MANUAL THERAPY 1/> REGIONS: CPT | Performed by: PHYSICAL THERAPIST

## 2023-07-03 PROCEDURE — 97110 THERAPEUTIC EXERCISES: CPT | Performed by: PHYSICAL THERAPIST

## 2023-07-03 NOTE — PROGRESS NOTES
[facility]   Physical Therapy Treatment Note      Name: Tato Godinez  Clinic Number: [id number]    Therapy Diagnosis:   Encounter Diagnosis   Name Primary?    Acute pain of right knee Yes     Physician: Lamine Early MD    Visit Date: 7/3/2023    Physician Orders: PT Eval and Treat   Medical Diagnosis from Referral: 3.411A (ICD-10-CM) - Tear of medial collateral ligament of right knee, initial encounter  Evaluation Date: 6/30/2023  Authorization Period Expiration: 7/30/2023  Plan of Care Expiration: 9/30/2023  Progress Note Due: 8/10/2023  Visit # / Visits authorized: 1/ 20  FOTO: 1/3     Precautions: Standard,   DOS 6/28/2023  PROCEDURES:   1. Right knee MCL repair with internal brace  2. Diagnostic right knee arthroscopy     The postop plan for the patient is to follow our MCL repair protocol.  Hinged knee brace locked in extension weightbear as tolerated for now.            PTA Visit #: 0/5     Time In: 0950  Time Out: 1110  Total Billable Time: 46 minutes    Subjective     Pt reports: Was able to complete here exercises over the weekend. Most challenged with knee flexion..  She was compliant with home exercise program.  Response to previous treatment: Improved mobility  Functional change: Improved gait    Pain: 1/10  Location: right knee      Objective      Observation: Presents in wheel chair with TSCOPE brace locked in extension, mild swelling. No signs of infection     Gait: Bilateral axillary crutches with TTWB.         Knee Passive Range of Motion:    Right  Left    Flexion 50 130   Extension +3 +1        Treatment     Tato received the treatments listed below:      therapeutic exercises to develop ROM, strength, flexibility, endurance for 8 minutes including:  Heel prop 5'  Heel slide to 40 degrees x 30  Seated knee flexion AAROM to protocol     manual therapy techniques: joint mobilizations and/or soft tissue mobilizations were applied to the: knee for 08 minutes,  "including:   Assessment of joint mobility and ROM  Patellar mobilizations all planes IV     neuromuscular re-education activities to improve: motor control, balance, muscle activation, proprioception, posture for 30 minutes. The following activities were included:  Quad set, 10 x 10"  SL Hip ABD in brace 3 x 10    NMES to the quadriceps to facilitate muscle activation 10/20 on/off for  minutes  Quad set - 10 minutes  SLR supine - 3 minutes    therapeutic activities to improve functional performance for 00  minutes, including:      gait training to improve functional mobility and safety for 00  minutes, including:      Patient Education and Home Exercises       Education provided:   - Diagnosis and prognosis     Written Home Exercises Provided: yes. Exercises were reviewed and Tato was able to demonstrate them prior to the end of the session.  Tato demonstrated good  understanding of the education provided. See EMR under Patient Instructions for exercises provided during therapy sessions.    Assessment     Progressing very well. Good quadriceps activation. Able to complete supine SLR without lag. Some discomfort with knee flexion ROM.  Goal is 60 degrees.     Tato Is progressing well towards her goals.   Pt prognosis is Excellent.     Pt will continue to benefit from skilled outpatient physical therapy to address the deficits listed in the problem list box on initial evaluation, provide pt/family education and to maximize pt's level of independence in the home and community environment.     Pt's spiritual, cultural and educational needs considered and pt agreeable to plan of care and goals.     Anticipated barriers to physical therapy: None    GOALS: Short Term Goals:  6 weeks  1.Report decreased knee pain  < / =  3/10  to increase tolerance for exercise  2. Increase knee ROM to 125 degrees flexion in order to be able to perform ADLs without difficulty.  3. Increase strength by 1/3 MMT grade in quadriceps  " to increase tolerance for ADL and work activities.  4. Pt to tolerate HEP to improve ROM and independence with ADL's     Long Term Goals: 24 weeks  1.Report decreased knee pain < / = 0/10  to increase tolerance for ADLs  2.Patient goal: Return to work in August  3.Increase strength to >/= 4+/5 in quadriceps  to increase tolerance for ADL and work activities.  4. Pt will report at CJ level (20-40% impaired) on FOTO knee to demonstrate increase in LE function with every day tasks.      Plan   Outpatient Physical Therapy 2 times weekly for 10 weeks to include the following interventions: manual therapy, therapeutic exercise, therapeutic activities, and neuromuscular re-education.       Gal Bran, PT  , DPT  Board Certified in Orthopedic Physical Therapy  Fellow, American Academy [employer] Orth[employer] Manual Physical Therapists

## 2023-07-06 ENCOUNTER — CLINICAL SUPPORT (OUTPATIENT)
Dept: REHABILITATION | Facility: HOSPITAL | Age: 43
End: 2023-07-06
Payer: COMMERCIAL

## 2023-07-06 DIAGNOSIS — M25.561 ACUTE PAIN OF RIGHT KNEE: Primary | ICD-10-CM

## 2023-07-06 PROCEDURE — 97140 MANUAL THERAPY 1/> REGIONS: CPT | Mod: CQ

## 2023-07-06 PROCEDURE — 97110 THERAPEUTIC EXERCISES: CPT | Mod: CQ

## 2023-07-06 PROCEDURE — 97112 NEUROMUSCULAR REEDUCATION: CPT | Mod: CQ

## 2023-07-06 NOTE — PROGRESS NOTES
"[facility]   Physical Therapy Treatment Note      Name: Tato Godinez  Clinic Number: [id number]    Therapy Diagnosis:   Encounter Diagnosis   Name Primary?    Acute pain of right knee Yes     Physician: Lamine Early MD    Visit Date: 7/6/2023    Physician Orders: PT Eval and Treat   Medical Diagnosis from Referral: 3.411A (ICD-10-CM) - Tear of medial collateral ligament of right knee, initial encounter  Evaluation Date: 6/30/2023  Authorization Period Expiration: 7/30/2023  Plan of Care Expiration: 9/30/2023  Progress Note Due: 8/10/2023  Visit # / Visits authorized: 2/20     Time In: 1300  Time Out: 1412  Total Billable Time: 46 minutes    FOTO IE 6/30: 29  FOTO 2:  FOTO 3:    Precautions: Standard, post-op MCL repair protocol    Procedure by Sharath: 6/28/23   1. Right knee MCL repair with internal brace  2. Diagnostic right knee arthroscopy     The postop plan for the patient is to follow our MCL repair protocol.  Hinged knee brace locked in extension weightbear as tolerated for now.            Subjective     Pt reports: she is struggling with her flexion ROM. Reports compliance with HEP. Presents in locked brace with two crutches.     She was compliant with home exercise program.  Response to previous treatment: Improved mobility  Functional change: Improved gait    Pain: 1/10  Location: right knee      Objective      DOS: 6/28/23  POD: 1 week, 1 day as of 7/6    Observation: Presents in wheel chair with TSCOPE brace locked in extension, mild swelling. No signs of infection     Gait: Bilateral axillary crutches with TTWB.      Knee Passive Range of Motion:    Right  Left    Flexion 60 130   Extension +3 +1        Treatment     Tato received the treatments listed below:      therapeutic exercises to develop ROM, strength, flexibility, endurance for 14 minutes including:    LLLD heel prop 3# x 5'  Ext hinge strap stretch 5 x 20"  EOT self flexion x 4'  Pt education: " "reviewed HEP, ROM goals     manual therapy techniques: joint mobilizations and/or soft tissue mobilizations were applied to the: knee for 12 minutes, including:     Assessment of joint mobility and ROM  Patellar mobilizations all planes IV  Ext hinge mob  EOT PROM flx to 60deg     neuromuscular re-education activities to improve: motor control, balance, muscle activation, proprioception, posture for 30 minutes. The following activities were included:    [place] estim (10" on: 10-20" off) -   - quad sets x 10'  - quad sets c towel roll under heel x 8'  - supine SLR s brace x 6'  S/L hip ABD s brace 3 x 10 x 3"    therapeutic activities to improve functional performance for 00  minutes, including:      gait training to improve functional mobility and safety for 00  minutes, including:      Patient Education and Home Exercises       Education provided:   - Diagnosis and prognosis     Written Home Exercises Provided: yes. Exercises were reviewed and Tato was able to demonstrate them prior to the end of the session.  Tato demonstrated good  understanding of the education provided. See EMR under Patient Instructions for exercises provided during therapy sessions.    Assessment     Tato did great today and was able to achieve her flx ROM goal of 60deg. Quad activation is slowly improving with min cueing to avoid lag on SLR descent. Encouraged continued compliance with HEP for ext strengthening/ROM; pt voiced understanding. No adverse effects reported p tx.     Tato Is progressing well towards her goals.   Pt prognosis is Excellent.     Pt will continue to benefit from skilled outpatient physical therapy to address the deficits listed in the problem list box on initial evaluation, provide pt/family education and to maximize pt's level of independence in the home and community environment.     Pt's spiritual, cultural and educational needs considered and pt agreeable to plan of care and goals.     Anticipated " barriers to physical therapy: None    GOALS: Short Term Goals:  6 weeks  1.Report decreased knee pain  < / =  3/10  to increase tolerance for exercise  2. Increase knee ROM to 125 degrees flexion in order to be able to perform ADLs without difficulty.  3. Increase strength by 1/3 MMT grade in quadriceps  to increase tolerance for ADL and work activities.  4. Pt to tolerate HEP to improve ROM and independence with ADL's     Long Term Goals: 24 weeks  1.Report decreased knee pain < / = 0/10  to increase tolerance for ADLs  2.Patient goal: Return to work in August  3.Increase strength to >/= 4+/5 in quadriceps  to increase tolerance for ADL and work activities.  4. Pt will report at CJ level (20-40% impaired) on FOTO knee to demonstrate increase in LE function with every day tasks.      Plan   Outpatient Physical Therapy 2 times weekly for 10 weeks to include the following interventions: manual therapy, therapeutic exercise, therapeutic activities, and neuromuscular re-education.    Monik Hope, PTA

## 2023-07-12 ENCOUNTER — CLINICAL SUPPORT (OUTPATIENT)
Dept: REHABILITATION | Facility: HOSPITAL | Age: 43
End: 2023-07-12
Payer: COMMERCIAL

## 2023-07-12 DIAGNOSIS — M25.561 ACUTE PAIN OF RIGHT KNEE: Primary | ICD-10-CM

## 2023-07-12 PROCEDURE — 97110 THERAPEUTIC EXERCISES: CPT | Performed by: PHYSICAL THERAPIST

## 2023-07-12 PROCEDURE — 97112 NEUROMUSCULAR REEDUCATION: CPT | Performed by: PHYSICAL THERAPIST

## 2023-07-12 PROCEDURE — 97140 MANUAL THERAPY 1/> REGIONS: CPT | Performed by: PHYSICAL THERAPIST

## 2023-07-12 NOTE — PROGRESS NOTES
"[facility]   Physical Therapy Treatment Note      Name: Tato Godinez  Clinic Number: [id number]    Therapy Diagnosis:   Encounter Diagnosis   Name Primary?    Acute pain of right knee Yes     Physician: Lamine Early MD    Visit Date: 7/12/2023    Physician Orders: PT Eval and Treat   Medical Diagnosis from Referral: 3.411A (ICD-10-CM) - Tear of medial collateral ligament of right knee, initial encounter  Evaluation Date: 6/30/2023  Authorization Period Expiration: 7/30/2023  Plan of Care Expiration: 9/30/2023  Progress Note Due: 8/10/2023  Visit # / Visits authorized: 3/20     Time In: 1242  Time Out: 1345  Total Billable Time: 46 minutes    FOTO IE 6/30: 29  FOTO 2:  FOTO 3:    Precautions: Standard, post-op MCL repair protocol    Procedure by Sharath: 6/28/23   1. Right knee MCL repair with internal brace  2. Diagnostic right knee arthroscopy     The postop plan for the patient is to follow our MCL repair protocol.  Hinged knee brace locked in extension weightbear as tolerated for now.            Subjective     Pt reports: Pain is well controlled but she is worried about her knee flexion.      She was compliant with home exercise program.  Response to previous treatment: Improved mobility  Functional change: Improved gait    Pain: 1/10  Location: right knee      Objective      DOS: 6/28/23  POD: 2 week, 1 day as of 7/12    Observation: Presents in wheel chair with TSCOPE brace locked in extension, mild swelling. No signs of infection     Gait: Bilateral axillary crutches with TTWB.      Knee Passive Range of Motion:    Right  Left    Flexion 60 130   Extension +3 +1        Treatment     Tato received the treatments listed below:      therapeutic exercises to develop ROM, strength, flexibility, endurance for 14 minutes including:    LLLD heel prop 3# x 5'  Ext hinge strap stretch 5 x 20"  EOT self flexion x 4'  Pt education: reviewed HEP, ROM goals     manual therapy " "techniques: joint mobilizations and/or soft tissue mobilizations were applied to the: knee for 12 minutes, including:     Assessment of joint mobility and ROM  Patellar mobilizations all planes IV  Ext hinge mob  EOT PROM flx to 60deg     neuromuscular re-education activities to improve: motor control, balance, muscle activation, proprioception, posture for 30 minutes. The following activities were included:    [place] estim (10" on: 10-20" off) -   - quad sets x 10'  - quad sets c towel roll under heel x 8'  - supine SLR s brace x 6'  S/L hip ABD s brace 3 x 10 x 3"    therapeutic activities to improve functional performance for 00  minutes, including:      gait training to improve functional mobility and safety for 00  minutes, including:      Patient Education and Home Exercises       Education provided:   - Diagnosis and prognosis     Written Home Exercises Provided: yes. Exercises were reviewed and Tato was able to demonstrate them prior to the end of the session.  Tato demonstrated good  understanding of the education provided. See EMR under Patient Instructions for exercises provided during therapy sessions.    Assessment     Tato is progressing well. She has some difficulty with flexion which improved with a patellar medial glide and soft tissue release on the medial aspect. She is able to complete exercises as expected. Continue current POC.     Tato Is progressing well towards her goals.   Pt prognosis is Excellent.     Pt will continue to benefit from skilled outpatient physical therapy to address the deficits listed in the problem list box on initial evaluation, provide pt/family education and to maximize pt's level of independence in the home and community environment.     Pt's spiritual, cultural and educational needs considered and pt agreeable to plan of care and goals.     Anticipated barriers to physical therapy: None    GOALS: Short Term Goals:  6 weeks  1.Report decreased knee pain  < " / =  3/10  to increase tolerance for exercise  2. Increase knee ROM to 125 degrees flexion in order to be able to perform ADLs without difficulty.  3. Increase strength by 1/3 MMT grade in quadriceps  to increase tolerance for ADL and work activities.  4. Pt to tolerate HEP to improve ROM and independence with ADL's     Long Term Goals: 24 weeks  1.Report decreased knee pain < / = 0/10  to increase tolerance for ADLs  2.Patient goal: Return to work in August  3.Increase strength to >/= 4+/5 in quadriceps  to increase tolerance for ADL and work activities.  4. Pt will report at CJ level (20-40% impaired) on FOTO knee to demonstrate increase in LE function with every day tasks.      Plan   Outpatient Physical Therapy 2 times weekly for 10 weeks to include the following interventions: manual therapy, therapeutic exercise, therapeutic activities, and neuromuscular re-education.    Gal Bran, PT, DPT

## 2023-07-13 ENCOUNTER — OFFICE VISIT (OUTPATIENT)
Dept: SPORTS MEDICINE | Facility: CLINIC | Age: 43
End: 2023-07-13
Payer: COMMERCIAL

## 2023-07-13 ENCOUNTER — PATIENT MESSAGE (OUTPATIENT)
Dept: SPORTS MEDICINE | Facility: CLINIC | Age: 43
End: 2023-07-13

## 2023-07-13 VITALS
HEART RATE: 72 BPM | BODY MASS INDEX: 33.86 KG/M2 | WEIGHT: 184 LBS | SYSTOLIC BLOOD PRESSURE: 99 MMHG | HEIGHT: 62 IN | DIASTOLIC BLOOD PRESSURE: 65 MMHG

## 2023-07-13 DIAGNOSIS — M25.561 ACUTE POSTOPERATIVE PAIN OF RIGHT KNEE: ICD-10-CM

## 2023-07-13 DIAGNOSIS — Z09 SURGERY FOLLOW-UP EXAMINATION: Primary | ICD-10-CM

## 2023-07-13 DIAGNOSIS — G89.18 ACUTE POSTOPERATIVE PAIN OF RIGHT KNEE: ICD-10-CM

## 2023-07-13 DIAGNOSIS — Z98.890 S/P MCL REPAIR: ICD-10-CM

## 2023-07-13 PROCEDURE — 99999 PR PBB SHADOW E&M-EST. PATIENT-LVL III: ICD-10-PCS | Mod: PBBFAC,,, | Performed by: PHYSICIAN ASSISTANT

## 2023-07-13 PROCEDURE — 1159F MED LIST DOCD IN RCRD: CPT | Mod: CPTII,S$GLB,, | Performed by: PHYSICIAN ASSISTANT

## 2023-07-13 PROCEDURE — 1160F RVW MEDS BY RX/DR IN RCRD: CPT | Mod: CPTII,S$GLB,, | Performed by: PHYSICIAN ASSISTANT

## 2023-07-13 PROCEDURE — 3078F DIAST BP <80 MM HG: CPT | Mod: CPTII,S$GLB,, | Performed by: PHYSICIAN ASSISTANT

## 2023-07-13 PROCEDURE — 1160F PR REVIEW ALL MEDS BY PRESCRIBER/CLIN PHARMACIST DOCUMENTED: ICD-10-PCS | Mod: CPTII,S$GLB,, | Performed by: PHYSICIAN ASSISTANT

## 2023-07-13 PROCEDURE — 1159F PR MEDICATION LIST DOCUMENTED IN MEDICAL RECORD: ICD-10-PCS | Mod: CPTII,S$GLB,, | Performed by: PHYSICIAN ASSISTANT

## 2023-07-13 PROCEDURE — 99999 PR PBB SHADOW E&M-EST. PATIENT-LVL III: CPT | Mod: PBBFAC,,, | Performed by: PHYSICIAN ASSISTANT

## 2023-07-13 PROCEDURE — 99024 PR POST-OP FOLLOW-UP VISIT: ICD-10-PCS | Mod: S$GLB,,, | Performed by: PHYSICIAN ASSISTANT

## 2023-07-13 PROCEDURE — 3008F PR BODY MASS INDEX (BMI) DOCUMENTED: ICD-10-PCS | Mod: CPTII,S$GLB,, | Performed by: PHYSICIAN ASSISTANT

## 2023-07-13 PROCEDURE — 3074F SYST BP LT 130 MM HG: CPT | Mod: CPTII,S$GLB,, | Performed by: PHYSICIAN ASSISTANT

## 2023-07-13 PROCEDURE — 99024 POSTOP FOLLOW-UP VISIT: CPT | Mod: S$GLB,,, | Performed by: PHYSICIAN ASSISTANT

## 2023-07-13 PROCEDURE — 3078F PR MOST RECENT DIASTOLIC BLOOD PRESSURE < 80 MM HG: ICD-10-PCS | Mod: CPTII,S$GLB,, | Performed by: PHYSICIAN ASSISTANT

## 2023-07-13 PROCEDURE — 3008F BODY MASS INDEX DOCD: CPT | Mod: CPTII,S$GLB,, | Performed by: PHYSICIAN ASSISTANT

## 2023-07-13 PROCEDURE — 3074F PR MOST RECENT SYSTOLIC BLOOD PRESSURE < 130 MM HG: ICD-10-PCS | Mod: CPTII,S$GLB,, | Performed by: PHYSICIAN ASSISTANT

## 2023-07-13 NOTE — PROGRESS NOTES
"CC: Right knee post op 2 weeks    Patient is here for her 2 week post op appointment s/p below and is doing well. Patient is doing PT at [facility] and is progressing as expected. Patient is no longer taking pain medication. she denies any chest pain, SOB, fevers, chills, nausea, vomiting, or drainage from incision sites.     DATE OF PROCEDURE: 6/28/2023     PREOPERATIVE DIAGNOSES:   1. Right knee high grade medial collateral ligament (MCL) tear.      POSTOPERATIVE DIAGNOSES:   1. Right knee high grade medial collateral ligament (MCL) tear.      PROCEDURES:   1. Right knee MCL repair with internal brace  2. Diagnostic right knee arthroscopy        SURGEON: Lamine Early M.D.        PE:    BP 99/65   Pulse 72   Ht 5' 2" (1.575 m)   Wt 83.5 kg (184 lb)   LMP 06/11/2023   BMI 33.65 kg/m²      Right knee:    Incisions clean/dry/intact  No sign of infection  Mild swelling  Compartments soft  Neurovascular status intact in extremity    PROM 0 to 45 degrees  Decreased quad strength  Minimal to no effusion    Assessment:  2 weeks s/p right knee MCL repair with internal brace, diagnostic arthroscopy    Plan:  1.  Removed dressings.  Wounds healing well    2.  Arthroscopic pictures and operative note reviewed and rehab plans discussed.    3.  Physical therapy for quad, ROM, modalities.  HEP for ROM, knee, VMO and hip abductor strengthening.     4.  Pain level acceptable for first post op visit.  Wean off pain medicine by next visit if still taking    5.  Return to clinic in 4 weeks at 6 weeks post-op.      All questions were answered. Instructed patient to call with questions or concerns in the interim.       Medical Dictation software was used during the dictation of portions or the entirety of this medical record.  Phonetic or grammatic errors may exist due to the use of this software. For clarification, refer to the author of the document.    "

## 2023-07-14 ENCOUNTER — CLINICAL SUPPORT (OUTPATIENT)
Dept: REHABILITATION | Facility: HOSPITAL | Age: 43
End: 2023-07-14
Payer: COMMERCIAL

## 2023-07-14 DIAGNOSIS — M25.561 ACUTE PAIN OF RIGHT KNEE: Primary | ICD-10-CM

## 2023-07-14 PROCEDURE — 97140 MANUAL THERAPY 1/> REGIONS: CPT | Performed by: PHYSICAL THERAPIST

## 2023-07-14 PROCEDURE — 97110 THERAPEUTIC EXERCISES: CPT | Performed by: PHYSICAL THERAPIST

## 2023-07-14 PROCEDURE — 97112 NEUROMUSCULAR REEDUCATION: CPT | Performed by: PHYSICAL THERAPIST

## 2023-07-14 NOTE — PROGRESS NOTES
[facility]   Physical Therapy Treatment Note      Name: Tato Godinez  Clinic Number: [id number]    Therapy Diagnosis:   Encounter Diagnosis   Name Primary?    Acute pain of right knee Yes     Physician: Lamine Early MD    Visit Date: 7/14/2023    Physician Orders: PT Eval and Treat   Medical Diagnosis from Referral: 3.411A (ICD-10-CM) - Tear of medial collateral ligament of right knee, initial encounter  Evaluation Date: 6/30/2023  Authorization Period Expiration: 7/30/2023  Plan of Care Expiration: 9/30/2023  Progress Note Due: 8/10/2023  Visit # / Visits authorized: 4/20     Time In: 1257  Time Out: 1410  Total Billable Time: 46 minutes    FOTO IE 6/30: 29  FOTO 2:  FOTO 3:    Precautions: Standard, post-op MCL repair protocol    Procedure by Sharath: 6/28/23   1. Right knee MCL repair with internal brace  2. Diagnostic right knee arthroscopy     The postop plan for the patient is to follow our MCL repair protocol.  Hinged knee brace locked in extension weightbear as tolerated for now.     BRACE ROM                      0° Weeks 0-4, 40-60° at Week 4 and increase as tolerated until discontinuing Week 6                      Diagnosis:       MCL Reconstruction                      Post-OP Week 1-3:   Begin:        Goals:   1. Minimize swelling and pain - TENS Unit/Ice            2. Establish quadriceps control           3. 40-60° AROM Knee flexion           4. Restore patellar mobilization           5. Protect graft, NO valgus stress                   Exercises:   1. Quad/hamstring/glut sets, straight leg raises (SLR) - flexion, extension, abduction           2. Hamstring stretch, ankle pumps           3. Patellar and tibiofibular mobilizations - grades I,II            4. PROM/AAROM - frequent/daily using Biodex or opposite leg (40-60° flexion)             Subjective     Pt reports: Top incision has 1 section which is not fully closed. Otherwise her 2 week follow up was very  "positive.      She was compliant with home exercise program.  Response to previous treatment: Improved mobility  Functional change: Improved gait    Pain: 1/10  Location: right knee      Objective      DOS: 6/28/23  POD: 2 week, 3 day as of 7/14    Observation: Presents in wheel chair with TSCOPE brace locked in extension, mild swelling. No signs of infection     Gait: Bilateral axillary crutches with TTWB.      Knee Passive Range of Motion:    Right  Left    Flexion 60 130   Extension +3 +1        Treatment     Tato received the treatments listed below:      therapeutic exercises to develop ROM, strength, flexibility, endurance for 8 minutes including:    LLLD heel prop  x 5'  EOT self flexion x 4'  Pt education: reviewed HEP, ROM goals     manual therapy techniques: joint mobilizations and/or soft tissue mobilizations were applied to the: knee for 12 minutes, including:     Assessment of joint mobility and ROM  Patellar mobilizations all planes IV  Ext hinge mob  EOT PROM flx to 60deg     neuromuscular re-education activities to improve: motor control, balance, muscle activation, proprioception, posture for 30 minutes. The following activities were included:    [place] estim (10" on: 10-20" off) -   - quad sets x 10'  - quad sets c towel roll under heel x 8'  - supine SLR s brace x 6'    S/L hip ABD s brace 3 x 10 x 3"  Prone hip extension 3 x 10  Standing heel raise in brace 3 x 10 ea      therapeutic activities to improve functional performance for 00  minutes, including:      gait training to improve functional mobility and safety for 00  minutes, including:      Patient Education and Home Exercises       Education provided:   - Diagnosis and prognosis     Written Home Exercises Provided: yes. Exercises were reviewed and Tato was able to demonstrate them prior to the end of the session.  Tato demonstrated good  understanding of the education provided. See EMR under Patient Instructions for exercises " provided during therapy sessions.    Assessment     Continues to progress well. Prone hip extension in brace added to HEP.    Tato Is progressing well towards her goals.   Pt prognosis is Excellent.     Pt will continue to benefit from skilled outpatient physical therapy to address the deficits listed in the problem list box on initial evaluation, provide pt/family education and to maximize pt's level of independence in the home and community environment.     Pt's spiritual, cultural and educational needs considered and pt agreeable to plan of care and goals.     Anticipated barriers to physical therapy: None    GOALS: Short Term Goals:  6 weeks  1.Report decreased knee pain  < / =  3/10  to increase tolerance for exercise  2. Increase knee ROM to 125 degrees flexion in order to be able to perform ADLs without difficulty.  3. Increase strength by 1/3 MMT grade in quadriceps  to increase tolerance for ADL and work activities.  4. Pt to tolerate HEP to improve ROM and independence with ADL's     Long Term Goals: 24 weeks  1.Report decreased knee pain < / = 0/10  to increase tolerance for ADLs  2.Patient goal: Return to work in August  3.Increase strength to >/= 4+/5 in quadriceps  to increase tolerance for ADL and work activities.  4. Pt will report at CJ level (20-40% impaired) on FOTO knee to demonstrate increase in LE function with every day tasks.      Plan   Outpatient Physical Therapy 2 times weekly for 10 weeks to include the following interventions: manual therapy, therapeutic exercise, therapeutic activities, and neuromuscular re-education.    Gal Bran, PT, DPT

## 2023-07-17 ENCOUNTER — CLINICAL SUPPORT (OUTPATIENT)
Dept: REHABILITATION | Facility: HOSPITAL | Age: 43
End: 2023-07-17
Payer: OTHER MISCELLANEOUS

## 2023-07-17 DIAGNOSIS — M25.561 ACUTE PAIN OF RIGHT KNEE: Primary | ICD-10-CM

## 2023-07-17 PROCEDURE — 97140 MANUAL THERAPY 1/> REGIONS: CPT | Mod: CQ

## 2023-07-17 PROCEDURE — 97110 THERAPEUTIC EXERCISES: CPT | Mod: CQ

## 2023-07-17 PROCEDURE — 97112 NEUROMUSCULAR REEDUCATION: CPT | Mod: CQ

## 2023-07-17 NOTE — PROGRESS NOTES
"[facility]   Physical Therapy Treatment Note      Name: Tato Godinez  Clinic Number: [id number]    Therapy Diagnosis:   Encounter Diagnosis   Name Primary?    Acute pain of right knee Yes     Physician: Lamine Early MD    Visit Date: 7/17/2023    Physician Orders: PT Eval and Treat   Medical Diagnosis from Referral: 3.411A (ICD-10-CM) - Tear of medial collateral ligament of right knee, initial encounter  Evaluation Date: 6/30/2023  Authorization Period Expiration: 7/30/2023  Plan of Care Expiration: 9/30/2023  Progress Note Due: 8/10/2023  Visit # / Visits authorized: 5/20     Time In: 1314 (arrived 12' late)  Time Out: 1410  Total Billable Time: 52 minutes    FOTO IE 6/30: 29  FOTO 2:  FOTO 3:    Precautions: Standard, post-op MCL repair protocol    Procedure by Sharath: 6/28/23   1. Right knee MCL repair with internal brace  2. Diagnostic right knee arthroscopy     The postop plan for the patient is to follow our MCL repair protocol.  Hinged knee brace locked in extension weightbear as tolerated for now.            Subjective     Pt reports: no new complaints. Reports compliance with HEP.      She was compliant with home exercise program.  Response to previous treatment: Improved mobility  Functional change: Improved gait    Pain: not verbalized/10  Location: right knee      Objective      DOS: 6/28/23  POD: 2 week, 5 days as of 7/17    Observation: Presents in wheel chair with TSCOPE brace locked in extension, mild swelling. No signs of infection     Gait: Bilateral axillary crutches with TTWB.      Knee Passive Range of Motion:    Right  Left    Flexion 60 130   Extension +3 +1        Treatment     Tato received the treatments listed below:      therapeutic exercises to develop ROM, strength, flexibility, endurance for 18 minutes including:    LLLD heel prop 5# x 5'  Ext hinge strap stretch 5 x 20"  Heel slides x 4'  EOT self flexion x 4' - np  Gait training in locked " "brace c single crutch   Pt education: reviewed HEP, ROM goals     manual therapy techniques: joint mobilizations and/or soft tissue mobilizations were applied to the: knee for 08 minutes, including:     Assessment of joint mobility and ROM  Patellar mobilizations all planes IV  Ext hinge mob  EOT PROM flx to 60deg - np     neuromuscular re-education activities to improve: motor control, balance, muscle activation, proprioception, posture for 26 minutes. The following activities were included:    [place] estim (10" on: 10-20" off) -   - quad sets c towel roll under calf and strapA x 8'  - quad sets c towel roll under calf x 8'  - quad sets c towel roll under heel x 4'  - long sitting SLR s brace x 6'  S/L hip ABD s brace 3 x 10 x 3" - np    therapeutic activities to improve functional performance for 00  minutes, including:      gait training to improve functional mobility and safety for 00  minutes, including:        Patient Education and Home Exercises       Education provided:   - Diagnosis and prognosis     Written Home Exercises Provided: yes. Exercises were reviewed and Tato was able to demonstrate them prior to the end of the session.  Tato demonstrated good  understanding of the education provided. See EMR under Patient Instructions for exercises provided during therapy sessions.    Assessment     Tato is progressing well. Quad activation continues to improve, and she was able to perform long sitting SLR s a lag. She demonstrated safe ambulation in locked brace with single crutch and was cleared by supervising DPT to drop to single crutch. Pt is able to achieve 60deg flx with ease; therefore, we will cancel appt later this week until we are cleared to progress ROM next week. Encouraged continued compliance with HEP and swelling management; pt voiced understanding. No adverse effects reported p tx.     Tato is progressing well towards her goals.   Pt prognosis is Excellent.     Pt will continue to " benefit from skilled outpatient physical therapy to address the deficits listed in the problem list box on initial evaluation, provide pt/family education and to maximize pt's level of independence in the home and community environment.     Pt's spiritual, cultural and educational needs considered and pt agreeable to plan of care and goals.     Anticipated barriers to physical therapy: None    GOALS: Short Term Goals:  6 weeks  1.Report decreased knee pain  < / =  3/10  to increase tolerance for exercise  2. Increase knee ROM to 125 degrees flexion in order to be able to perform ADLs without difficulty.  3. Increase strength by 1/3 MMT grade in quadriceps  to increase tolerance for ADL and work activities.  4. Pt to tolerate HEP to improve ROM and independence with ADL's     Long Term Goals: 24 weeks  1.Report decreased knee pain < / = 0/10  to increase tolerance for ADLs  2.Patient goal: Return to work in August  3.Increase strength to >/= 4+/5 in quadriceps  to increase tolerance for ADL and work activities.  4. Pt will report at CJ level (20-40% impaired) on FOTO knee to demonstrate increase in LE function with every day tasks.      Plan   Outpatient Physical Therapy 2 times weekly for 10 weeks to include the following interventions: manual therapy, therapeutic exercise, therapeutic activities, and neuromuscular re-education.    Monik Hope PTA, DPT

## 2023-07-24 ENCOUNTER — CLINICAL SUPPORT (OUTPATIENT)
Dept: REHABILITATION | Facility: HOSPITAL | Age: 43
End: 2023-07-24
Payer: COMMERCIAL

## 2023-07-24 DIAGNOSIS — M25.561 ACUTE PAIN OF RIGHT KNEE: Primary | ICD-10-CM

## 2023-07-24 PROCEDURE — 97112 NEUROMUSCULAR REEDUCATION: CPT | Mod: CQ

## 2023-07-24 PROCEDURE — 97110 THERAPEUTIC EXERCISES: CPT | Mod: CQ

## 2023-07-24 PROCEDURE — 97140 MANUAL THERAPY 1/> REGIONS: CPT | Mod: CQ

## 2023-07-24 NOTE — PROGRESS NOTES
"[facility]   Physical Therapy Treatment Note      Name: Tato Godinez  Clinic Number: [id number]    Therapy Diagnosis:   Encounter Diagnosis   Name Primary?    Acute pain of right knee Yes     Physician: Lamine Early MD    Visit Date: 7/24/2023    Physician Orders: PT Eval and Treat   Medical Diagnosis from Referral: 3.411A (ICD-10-CM) - Tear of medial collateral ligament of right knee, initial encounter  Evaluation Date: 6/30/2023  Authorization Period Expiration: 7/30/2023  Plan of Care Expiration: 9/30/2023  Progress Note Due: 8/10/2023  Visit # / Visits authorized: 6/20     Time In: 1300  Time Out: 1400  Total Billable Time: 52 minutes    FOTO IE 6/30: 29  FOTO 2:  FOTO 3:    Precautions: Standard, post-op MCL repair protocol    Procedure by Sharath: 6/28/23   1. Right knee MCL repair with internal brace  2. Diagnostic right knee arthroscopy     The postop plan for the patient is to follow our MCL repair protocol.  Hinged knee brace locked in extension weightbear as tolerated for now.            Subjective     Pt reports: difficulty sleeping lately. No issues with singe crutch. Reports compliance with HEP. Presents in locked brace with single crutch.     She was compliant with home exercise program.  Response to previous treatment: Improved mobility  Functional change: Improved gait    Pain: not verbalized/10  Location: right knee      Objective      DOS: 6/28/23  POD: 3 weeks, 5 days as of 7/24    Observation: Presents in wheel chair with TSCOPE brace locked in extension, mild swelling. No signs of infection     Gait: Bilateral axillary crutches with TTWB.      Knee Passive Range of Motion:    Right  Left    Flexion 67 130   Extension +2 +3        Treatment     Tato received the treatments listed below:      therapeutic exercises to develop ROM, strength, flexibility, endurance for 17 minutes including:    LLLD heel prop 5# x 6'  Ext hinge strap stretch 5 x 20"  Heel " "slides x 4' - np  EOT self flexion x 4'   Pt education: reviewed HEP, ROM goals     manual therapy techniques: joint mobilizations and/or soft tissue mobilizations were applied to the: knee for 12 minutes, including:     Assessment of joint mobility and ROM  Patellar mobilizations all planes IV  Ext hinge mob  EOT PROM flx      neuromuscular re-education activities to improve: motor control, balance, muscle activation, proprioception, posture for 23 minutes. The following activities were included:    [place] estim (10" on: 10-20" off) -   - quad sets c towel roll under calf x 8'  - quad sets c towel roll under heel x 8'  - long sitting SLR s brace x 6'  S/L hip ABD s brace 3 x 10 x 3" - np    therapeutic activities to improve functional performance for 00  minutes, including:      gait training to improve functional mobility and safety for 00  minutes, including:        Patient Education and Home Exercises       Education provided:   - Diagnosis and prognosis     Written Home Exercises Provided: yes. Exercises were reviewed and Tato was able to demonstrate them prior to the end of the session.  Tato demonstrated good  understanding of the education provided. See EMR under Patient Instructions for exercises provided during therapy sessions.    Assessment     Tato is progressing well. Quad activation continues to improve, and she was able to perform long sitting SLR s a lag. Pt is able to achieve flexion ROM goals for this phase of rehab; we will progress at 4 week milestone next tx. Encouraged continued compliance with HEP and swelling management; pt voiced understanding. No adverse effects reported p tx.     Tato is progressing well towards her goals.   Pt prognosis is Excellent.     Pt will continue to benefit from skilled outpatient physical therapy to address the deficits listed in the problem list box on initial evaluation, provide pt/family education and to maximize pt's level of independence in the " home and community environment.     Pt's spiritual, cultural and educational needs considered and pt agreeable to plan of care and goals.     Anticipated barriers to physical therapy: None    GOALS: Short Term Goals:  6 weeks  1.Report decreased knee pain  < / =  3/10  to increase tolerance for exercise  2. Increase knee ROM to 125 degrees flexion in order to be able to perform ADLs without difficulty.  3. Increase strength by 1/3 MMT grade in quadriceps  to increase tolerance for ADL and work activities.  4. Pt to tolerate HEP to improve ROM and independence with ADL's     Long Term Goals: 24 weeks  1.Report decreased knee pain < / = 0/10  to increase tolerance for ADLs  2.Patient goal: Return to work in August  3.Increase strength to >/= 4+/5 in quadriceps  to increase tolerance for ADL and work activities.  4. Pt will report at CJ level (20-40% impaired) on FOTO knee to demonstrate increase in LE function with every day tasks.      Plan   Outpatient Physical Therapy 2 times weekly for 10 weeks to include the following interventions: manual therapy, therapeutic exercise, therapeutic activities, and neuromuscular re-education.    Monik Hope, PTA

## 2023-07-26 ENCOUNTER — CLINICAL SUPPORT (OUTPATIENT)
Dept: REHABILITATION | Facility: HOSPITAL | Age: 43
End: 2023-07-26
Payer: COMMERCIAL

## 2023-07-26 DIAGNOSIS — M25.561 ACUTE PAIN OF RIGHT KNEE: Primary | ICD-10-CM

## 2023-07-26 PROCEDURE — 97530 THERAPEUTIC ACTIVITIES: CPT | Performed by: PHYSICAL THERAPIST

## 2023-07-26 PROCEDURE — 97140 MANUAL THERAPY 1/> REGIONS: CPT | Performed by: PHYSICAL THERAPIST

## 2023-07-26 PROCEDURE — 97110 THERAPEUTIC EXERCISES: CPT | Performed by: PHYSICAL THERAPIST

## 2023-07-26 PROCEDURE — 97112 NEUROMUSCULAR REEDUCATION: CPT | Performed by: PHYSICAL THERAPIST

## 2023-07-26 NOTE — PROGRESS NOTES
[facility]   Physical Therapy Treatment Note      Name: Tato Godinez  Clinic Number: [id number]    Therapy Diagnosis:   Encounter Diagnosis   Name Primary?    Acute pain of right knee Yes     Physician: Lamine Early MD    Visit Date: 7/26/2023    Physician Orders: PT Eval and Treat   Medical Diagnosis from Referral: 3.411A (ICD-10-CM) - Tear of medial collateral ligament of right knee, initial encounter  Evaluation Date: 6/30/2023  Authorization Period Expiration: 7/30/2023  Plan of Care Expiration: 9/30/2023  Progress Note Due: 8/10/2023  Visit # / Visits authorized: 6/20     Time In: 1300  Time Out: 1400  Total Billable Time: 47 minutes    FOTO IE 6/30: 29  FOTO 2:  FOTO 3:    Precautions: Standard, post-op MCL repair protocol    Procedure by Sharath: 6/28/23   1. Right knee MCL repair with internal brace  2. Diagnostic right knee arthroscopy     The postop plan for the patient is to follow our MCL repair protocol.  Hinged knee brace locked in extension weightbear as tolerated for now.            Subjective     Pt reports: Having some increased knee pain because she was with her daughter yesterday who had multiple volleyball games. Has been working on her exercises but not doing as much knee flexion.     She was compliant with home exercise program.  Response to previous treatment: Improved mobility  Functional change: Improved gait    Pain: not verbalized/10  Location: right knee      Objective      DOS: 6/28/23  POD: 4 weeks, 1 days as of 7/26    Observation: Presents in wheel chair with TSCOPE brace locked in extension, mild swelling. No signs of infection     Gait: Bilateral axillary crutches with TTWB.      Knee Passive Range of Motion:    Right  Left    Flexion 75 130   Extension +2 +3        Treatment     Tato received the treatments listed below:      therapeutic exercises to develop ROM, strength, flexibility, endurance for 10 minutes including:    LLLD heel  "prop 5# x 6'  Ext hinge strap stretch 5 x 20"  Heel slides 30x  EOT self flexion x 4'   Tail gaiter   Pt education: reviewed HEP, ROM goals     manual therapy techniques: joint mobilizations and/or soft tissue mobilizations were applied to the: knee for 12 minutes, including:     Assessment of joint mobility and ROM  Patellar mobilizations all planes IV  Ext hinge mob  EOT PROM flx      neuromuscular re-education activities to improve: motor control, balance, muscle activation, proprioception, posture for 15 minutes. The following activities were included:  Seated SLR 4 X 8  Quad set 10 x 10"  Seated LAQ to HS curl for muscle activation in new ROM. 3'      NP:  [place] estim (10" on: 10-20" off) -   - quad sets c towel roll under calf x 8'  - quad sets c towel roll under heel x 8'  - long sitting SLR s brace x 6'  S/L hip ABD s brace 3 x 10 x 3" - np    therapeutic activities to improve functional performance for 10  minutes, including:  Standing mini squat 3 x 10  Standing TKE for quad/extension training for gait      gait training to improve functional mobility and safety for 00  minutes, including:        Patient Education and Home Exercises       Education provided:   - Diagnosis and prognosis     Written Home Exercises Provided: yes. Exercises were reviewed and Tato was able to demonstrate them prior to the end of the session.  Tato demonstrated good  understanding of the education provided. See EMR under Patient Instructions for exercises provided during therapy sessions.    Assessment     Slight stiffness into extension. SLR looks good today. Fat pad a bit stiff. Flexion ahs improved. Slowly progress to full ROM. Begin preporation for ambulating outside of brace.     Tato is progressing well towards her goals.   Pt prognosis is Excellent.     Pt will continue to benefit from skilled outpatient physical therapy to address the deficits listed in the problem list box on initial evaluation, provide " pt/family education and to maximize pt's level of independence in the home and community environment.     Pt's spiritual, cultural and educational needs considered and pt agreeable to plan of care and goals.     Anticipated barriers to physical therapy: None    GOALS: Short Term Goals:  6 weeks  1.Report decreased knee pain  < / =  3/10  to increase tolerance for exercise  2. Increase knee ROM to 125 degrees flexion in order to be able to perform ADLs without difficulty.  3. Increase strength by 1/3 MMT grade in quadriceps  to increase tolerance for ADL and work activities.  4. Pt to tolerate HEP to improve ROM and independence with ADL's     Long Term Goals: 24 weeks  1.Report decreased knee pain < / = 0/10  to increase tolerance for ADLs  2.Patient goal: Return to work in August  3.Increase strength to >/= 4+/5 in quadriceps  to increase tolerance for ADL and work activities.  4. Pt will report at CJ level (20-40% impaired) on FOTO knee to demonstrate increase in LE function with every day tasks.      Plan   Outpatient Physical Therapy 2 times weekly for 10 weeks to include the following interventions: manual therapy, therapeutic exercise, therapeutic activities, and neuromuscular re-education.    Gal Bran, PT

## 2023-07-31 ENCOUNTER — PATIENT MESSAGE (OUTPATIENT)
Dept: RESEARCH | Facility: HOSPITAL | Age: 43
End: 2023-07-31
Payer: COMMERCIAL

## 2023-07-31 ENCOUNTER — CLINICAL SUPPORT (OUTPATIENT)
Dept: REHABILITATION | Facility: HOSPITAL | Age: 43
End: 2023-07-31
Payer: COMMERCIAL

## 2023-07-31 DIAGNOSIS — M25.561 ACUTE PAIN OF RIGHT KNEE: Primary | ICD-10-CM

## 2023-07-31 PROCEDURE — 97530 THERAPEUTIC ACTIVITIES: CPT | Mod: CQ

## 2023-07-31 PROCEDURE — 97140 MANUAL THERAPY 1/> REGIONS: CPT | Mod: CQ

## 2023-07-31 PROCEDURE — 97112 NEUROMUSCULAR REEDUCATION: CPT | Mod: CQ

## 2023-07-31 PROCEDURE — 97110 THERAPEUTIC EXERCISES: CPT | Mod: CQ

## 2023-07-31 NOTE — PROGRESS NOTES
"[facility]   Physical Therapy Treatment Note      Name: Tato Godinez  Clinic Number: [id number]    Therapy Diagnosis:   Encounter Diagnosis   Name Primary?    Acute pain of right knee Yes     Physician: Lamine Early MD    Visit Date: 7/31/2023    Physician Orders: PT Eval and Treat   Medical Diagnosis from Referral: 3.411A (ICD-10-CM) - Tear of medial collateral ligament of right knee, initial encounter  Evaluation Date: 6/30/2023  Authorization Period Expiration: 7/30/2023  Plan of Care Expiration: 9/30/2023  Progress Note Due: 8/10/2023  Visit # / Visits authorized: 8/20     Time In: 1304  Time Out: 1407  Total Billable Time: 54 minutes    FOTO IE 6/30: 29  FOTO 2:  FOTO 3:    Precautions: Standard, post-op MCL repair protocol    Procedure by Sharath: 6/28/23   1. Right knee MCL repair with internal brace  2. Diagnostic right knee arthroscopy     The postop plan for the patient is to follow our MCL repair protocol.  Hinged knee brace locked in extension weightbear as tolerated for now.            Subjective     Pt reports: min knee soreness/stiffness after returning to work this morning. She worked a partial shift from 8-noon.     She was compliant with home exercise program.  Response to previous treatment: Improved mobility  Functional change: Improved gait    Pain: not verbalized/10  Location: right knee      Objective      DOS: 6/28/23  POD: 4 weeks, 5 days as of 7/31    Observation: Presents in wheel chair with TSCOPE brace locked in extension, mild swelling. No signs of infection     Gait: Bilateral axillary crutches with TTWB.      Knee Passive Range of Motion:    Right  Left    Flexion 100 130   Extension +2 +3        Treatment     Tato received the treatments listed below:      therapeutic exercises to develop ROM, strength, flexibility, endurance for 16 minutes including:    LLLD heel prop 8# x 5'  Ext hinge strap stretch 5 x 20"  Calf raises 45 x 3"  Heel " "slides x 4'     manual therapy techniques: joint mobilizations and/or soft tissue mobilizations were applied to the: knee for 08 minutes, including:     Assessment of joint mobility and ROM  Patellar mobilizations all planes IV  Fat pad mobs   Ext hinge mob  EOT PROM flx - np     neuromuscular re-education activities to improve: motor control, balance, muscle activation, proprioception, posture for 20 minutes. The following activities were included:    Quad sets c towel roll under calf/knee 10" hold x 4'  Seated SLR 3 x 8 x 3"  Seated LAQ into HSC x 3'  CKC TKE GTB 5" hold x 4'  S/L hip ABD s brace 3 x 10 x 3" - np    therapeutic activities to improve functional performance for 08  minutes, including:    Standing mini squat behind mat 3 x 10    gait training to improve functional mobility and safety for 00  minutes, including:        Patient Education and Home Exercises       Education provided:   - Diagnosis and prognosis     Written Home Exercises Provided: yes. Exercises were reviewed and Tato was able to demonstrate them prior to the end of the session.  Tato demonstrated good  understanding of the education provided. See EMR under Patient Instructions for exercises provided during therapy sessions.    Assessment     Tato did great today. Min stiffness at end range TKE upon entry p returning to work this morning; passive hyperext was easily achieved p stretches and manual. Flexion ROM increased today with min discomfort/stiffness at end range. Cueing required during mini squats for equal WB. Encouraged continued compliance with HEP. 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 No adverse effects reported p tx.     Tato is progressing well towards her goals.   Pt prognosis is Excellent.     Pt will continue to benefit from skilled outpatient physical therapy to address the deficits listed in the problem list box on initial evaluation, provide pt/family education and to maximize pt's level of " independence in the home and community environment.     Pt's spiritual, cultural and educational needs considered and pt agreeable to plan of care and goals.     Anticipated barriers to physical therapy: None    GOALS: Short Term Goals:  6 weeks  1.Report decreased knee pain  < / =  3/10  to increase tolerance for exercise  2. Increase knee ROM to 125 degrees flexion in order to be able to perform ADLs without difficulty.  3. Increase strength by 1/3 MMT grade in quadriceps  to increase tolerance for ADL and work activities.  4. Pt to tolerate HEP to improve ROM and independence with ADL's     Long Term Goals: 24 weeks  1.Report decreased knee pain < / = 0/10  to increase tolerance for ADLs  2.Patient goal: Return to work in August  3.Increase strength to >/= 4+/5 in quadriceps  to increase tolerance for ADL and work activities.  4. Pt will report at CJ level (20-40% impaired) on FOTO knee to demonstrate increase in LE function with every day tasks.      Plan   Outpatient Physical Therapy 2 times weekly for 10 weeks to include the following interventions: manual therapy, therapeutic exercise, therapeutic activities, and neuromuscular re-education.    Monik Hope, PTA

## 2023-08-02 ENCOUNTER — CLINICAL SUPPORT (OUTPATIENT)
Dept: REHABILITATION | Facility: HOSPITAL | Age: 43
End: 2023-08-02
Payer: COMMERCIAL

## 2023-08-02 DIAGNOSIS — M25.561 ACUTE PAIN OF RIGHT KNEE: Primary | ICD-10-CM

## 2023-08-02 PROCEDURE — 97112 NEUROMUSCULAR REEDUCATION: CPT | Mod: CQ

## 2023-08-02 PROCEDURE — 97140 MANUAL THERAPY 1/> REGIONS: CPT | Mod: CQ

## 2023-08-02 PROCEDURE — 97110 THERAPEUTIC EXERCISES: CPT | Mod: CQ

## 2023-08-02 NOTE — PROGRESS NOTES
"[facility]   Physical Therapy Treatment Note      Name: Tato Godinez  Clinic Number: [id number]    Therapy Diagnosis:   Encounter Diagnosis   Name Primary?    Acute pain of right knee Yes     Physician: Lamine Early MD    Visit Date: 8/2/2023    Physician Orders: PT Eval and Treat   Medical Diagnosis from Referral: 3.411A (ICD-10-CM) - Tear of medial collateral ligament of right knee, initial encounter  Evaluation Date: 6/30/2023  Authorization Period Expiration: 7/30/2023  Plan of Care Expiration: 9/30/2023  Progress Note Due: 8/10/2023  Visit # / Visits authorized: 9/20     Time In: 1301  Time Out: 1407  Total Billable Time: 55 minutes    FOTO IE 6/30: 29  FOTO 2:  FOTO 3:    Precautions: Standard, post-op MCL repair protocol    Procedure by Sharath: 6/28/23   1. Right knee MCL repair with internal brace  2. Diagnostic right knee arthroscopy     The postop plan for the patient is to follow our MCL repair protocol.  Hinged knee brace locked in extension weightbear as tolerated for now.         Subjective     Pt reports: min knee soreness/stiffness after working an 8hr shift yesterday. Presents in locked brace with single crutch.     She was compliant with home exercise program.  Response to previous treatment: Improved mobility  Functional change: Improved gait    Pain: 2/10  Location: right knee      Objective      DOS: 6/28/23  POD: 5 weeks as of 8/2    Observation: Presents in wheel chair with TSCOPE brace locked in extension, mild swelling. No signs of infection     Gait: Bilateral axillary crutches with TTWB.      Knee Passive Range of Motion:    Right  Left    Flexion 110 130   Extension +2 +3        Treatment     Tato received the treatments listed below:      therapeutic exercises to develop ROM, strength, flexibility, endurance for 17 minutes including:    LLLD heel prop 8# x 5'  Ext hinge strap stretch 5 x 20"  Calf raises 20 x 10"  Heel slides x 3'  Pt " "education: HEP when returning to work     manual therapy techniques: joint mobilizations and/or soft tissue mobilizations were applied to the: knee for 08 minutes, including:     Assessment of joint mobility and ROM  Patellar mobilizations all planes IV  Fat pad mobs   Ext hinge mob  EOT PROM flx - np     neuromuscular re-education activities to improve: motor control, balance, muscle activation, proprioception, posture for 30 minutes. The following activities were included:    Quad sets c towel roll under calf/knee and strapA 10" hold x 3'  Quad sets c towel roll under calf/knee 10" hold x 3'  Seated SLR 3 x 8 x 3"  Seated LAQ into HSC x 3'  CKC TKE GTB 5" hold x 4'  DL shuttle (75-0deg) 62.5# 4 x 15  S/L hip ABD s brace 3 x 10 x 3" - np    therapeutic activities to improve functional performance for 00  minutes, including:    Standing mini squat behind mat 3 x 10    gait training to improve functional mobility and safety for 00  minutes, including:        Patient Education and Home Exercises       Education provided:   - Diagnosis and prognosis     Written Home Exercises Provided: yes. Exercises were reviewed and Tato was able to demonstrate them prior to the end of the session.  Tato demonstrated good  understanding of the education provided. See EMR under Patient Instructions for exercises provided during therapy sessions.    Assessment     Tato did great today. Min stiffness at end range TKE upon entry since returning to work; passive hyperext was easily achieved p stretches and manual. Flexion ROM increased today with min discomfort/stiffness at end range. Good tolerance of initiation of DL shuttle press. Encouraged continued compliance with HEP, especially when returning to work full time next week. No adverse effects reported p tx.     Tato is progressing well towards her goals.   Pt prognosis is Excellent.     Pt will continue to benefit from skilled outpatient physical therapy to address the " deficits listed in the problem list box on initial evaluation, provide pt/family education and to maximize pt's level of independence in the home and community environment.     Pt's spiritual, cultural and educational needs considered and pt agreeable to plan of care and goals.     Anticipated barriers to physical therapy: None    GOALS: Short Term Goals:  6 weeks  1.Report decreased knee pain  < / =  3/10  to increase tolerance for exercise  2. Increase knee ROM to 125 degrees flexion in order to be able to perform ADLs without difficulty.  3. Increase strength by 1/3 MMT grade in quadriceps  to increase tolerance for ADL and work activities.  4. Pt to tolerate HEP to improve ROM and independence with ADL's     Long Term Goals: 24 weeks  1.Report decreased knee pain < / = 0/10  to increase tolerance for ADLs  2.Patient goal: Return to work in August  3.Increase strength to >/= 4+/5 in quadriceps  to increase tolerance for ADL and work activities.  4. Pt will report at CJ level (20-40% impaired) on FOTO knee to demonstrate increase in LE function with every day tasks.      Plan   Outpatient Physical Therapy 2 times weekly for 10 weeks to include the following interventions: manual therapy, therapeutic exercise, therapeutic activities, and neuromuscular re-education.    Monik Hope, PTA

## 2023-08-08 ENCOUNTER — CLINICAL SUPPORT (OUTPATIENT)
Dept: REHABILITATION | Facility: HOSPITAL | Age: 43
End: 2023-08-08
Payer: COMMERCIAL

## 2023-08-08 DIAGNOSIS — M25.561 ACUTE PAIN OF RIGHT KNEE: Primary | ICD-10-CM

## 2023-08-08 PROCEDURE — 97112 NEUROMUSCULAR REEDUCATION: CPT | Mod: CQ

## 2023-08-08 PROCEDURE — 97110 THERAPEUTIC EXERCISES: CPT | Mod: CQ

## 2023-08-08 NOTE — PROGRESS NOTES
[facility]   Physical Therapy Treatment Note      Name: Tato Godinez  Clinic Number: [id number]    Therapy Diagnosis:   Encounter Diagnosis   Name Primary?    Acute pain of right knee Yes     Physician: Lamine Early MD    Visit Date: 8/8/2023    Physician Orders: PT Eval and Treat   Medical Diagnosis from Referral: 3.411A (ICD-10-CM) - Tear of medial collateral ligament of right knee, initial encounter  Evaluation Date: 6/30/2023  Authorization Period Expiration: 7/30/2023  Plan of Care Expiration: 9/30/2023  Progress Note Due: 8/10/2023  Visit # / Visits authorized: 10/20     Time In: 1301  Time Out: 1407  Total Billable Time: 46 minutes    FOTO IE 6/30: 29  FOTO 2:  FOTO 3:    Precautions: Standard, post-op MCL repair protocol    Procedure by Sharath: 6/28/23   1. Right knee MCL repair with internal brace  2. Diagnostic right knee arthroscopy     The postop plan for the patient is to follow our MCL repair protocol.  Hinged knee brace locked in extension weightbear as tolerated for now.         Subjective     Pt reports: min knee stiffness after returning to work. She has been using single crutch at work and no AD at home with no adverse effects. Presents in locked brace with single crutch.     She was compliant with home exercise program.  Response to previous treatment: Improved mobility  Functional change: Improved gait    Pain: 2/10  Location: right knee      Objective      DOS: 6/28/23  POD: 5 weeks, 6 days as of 8/2    Observation: Presents in wheel chair with TSCOPE brace locked in extension, mild swelling. No signs of infection     Gait: Bilateral axillary crutches with WBAT     Knee Passive Range of Motion:    Right  Left    Flexion 110 130   Extension +2 +3        Treatment     Tato received the treatments listed below:      therapeutic exercises to develop ROM, strength, flexibility, endurance for 23 minutes including:    LLLD heel prop 8# x 5'  Ext hinge strap  "stretch 5 x 20"  Seated LAQ into HSC x 4'  Upright bike seat height 2, lvl 2 x 10' for joint mobility/flexion ROM  Pt education: reviewed HEP, crutch wean    NP:  Calf raises 20 x 10"  Heel slides x 3'      manual therapy techniques: joint mobilizations and/or soft tissue mobilizations were applied to the: knee for 04 minutes, including:     Assessment of joint mobility and ROM  Patellar mobilizations all planes IV  Fat pad mobs   Ext hinge mob  EOT PROM flx - np     neuromuscular re-education activities to improve: motor control, balance, muscle activation, proprioception, posture for 32 minutes. The following activities were included:    Quad sets c towel roll under calf/knee and strapA 10" hold x 3'  Quad sets c towel roll under calf/knee 10" hold x 3'  Quad sets into hyperext 10" hold x 3'  Seated SLR 1# 3 x 8 x 3"  CKC TKE GTB 5" hold x 4' - np  DL shuttle press 62.5# 4 x 15  SL shuttle press 37.5# 3 x 8  S/L hip ABD s brace 3 x 10 x 3" - np    therapeutic activities to improve functional performance for 00 minutes, including:    Standing mini squat behind mat 3 x 10    gait training to improve functional mobility and safety for 00  minutes, including:        Patient Education and Home Exercises       Education provided:   - Diagnosis and prognosis     Written Home Exercises Provided: yes. Exercises were reviewed and Tato was able to demonstrate them prior to the end of the session.  Tato demonstrated good  understanding of the education provided. See EMR under Patient Instructions for exercises provided during therapy sessions.    Assessment     Tato is progressing very well at this time. Min stiffness at end range TKE upon entry since returning to work; she was able to achieve both passive and active hyperext p stretches and manual. She was challenged by progressed SLR. Good tolerance of initiation of upright bike with no discomfort reported at end range flexion. No adverse effects reported p tx. "     Tato is progressing well towards her goals.   Pt prognosis is Excellent.     Pt will continue to benefit from skilled outpatient physical therapy to address the deficits listed in the problem list box on initial evaluation, provide pt/family education and to maximize pt's level of independence in the home and community environment.     Pt's spiritual, cultural and educational needs considered and pt agreeable to plan of care and goals.     Anticipated barriers to physical therapy: None    GOALS: Short Term Goals:  6 weeks  1.Report decreased knee pain  < / =  3/10  to increase tolerance for exercise  2. Increase knee ROM to 125 degrees flexion in order to be able to perform ADLs without difficulty.  3. Increase strength by 1/3 MMT grade in quadriceps  to increase tolerance for ADL and work activities.  4. Pt to tolerate HEP to improve ROM and independence with ADL's     Long Term Goals: 24 weeks  1.Report decreased knee pain < / = 0/10  to increase tolerance for ADLs  2.Patient goal: Return to work in August  3.Increase strength to >/= 4+/5 in quadriceps  to increase tolerance for ADL and work activities.  4. Pt will report at CJ level (20-40% impaired) on FOTO knee to demonstrate increase in LE function with every day tasks.      Plan   Outpatient Physical Therapy 2 times weekly for 10 weeks to include the following interventions: manual therapy, therapeutic exercise, therapeutic activities, and neuromuscular re-education.    Monik Hope, PTA

## 2023-08-10 ENCOUNTER — OFFICE VISIT (OUTPATIENT)
Dept: SPORTS MEDICINE | Facility: CLINIC | Age: 43
End: 2023-08-10
Payer: COMMERCIAL

## 2023-08-10 ENCOUNTER — CLINICAL SUPPORT (OUTPATIENT)
Dept: REHABILITATION | Facility: HOSPITAL | Age: 43
End: 2023-08-10
Payer: COMMERCIAL

## 2023-08-10 VITALS
DIASTOLIC BLOOD PRESSURE: 74 MMHG | BODY MASS INDEX: 33.65 KG/M2 | HEIGHT: 62 IN | HEART RATE: 72 BPM | SYSTOLIC BLOOD PRESSURE: 112 MMHG

## 2023-08-10 DIAGNOSIS — M25.561 ACUTE PAIN OF RIGHT KNEE: Primary | ICD-10-CM

## 2023-08-10 DIAGNOSIS — Z09 SURGERY FOLLOW-UP EXAMINATION: Primary | ICD-10-CM

## 2023-08-10 PROCEDURE — 99024 PR POST-OP FOLLOW-UP VISIT: ICD-10-PCS | Mod: S$GLB,,, | Performed by: ORTHOPAEDIC SURGERY

## 2023-08-10 PROCEDURE — 97110 THERAPEUTIC EXERCISES: CPT | Performed by: PHYSICAL THERAPIST

## 2023-08-10 PROCEDURE — 3008F PR BODY MASS INDEX (BMI) DOCUMENTED: ICD-10-PCS | Mod: CPTII,S$GLB,, | Performed by: ORTHOPAEDIC SURGERY

## 2023-08-10 PROCEDURE — 1159F PR MEDICATION LIST DOCUMENTED IN MEDICAL RECORD: ICD-10-PCS | Mod: CPTII,S$GLB,, | Performed by: ORTHOPAEDIC SURGERY

## 2023-08-10 PROCEDURE — 3078F DIAST BP <80 MM HG: CPT | Mod: CPTII,S$GLB,, | Performed by: ORTHOPAEDIC SURGERY

## 2023-08-10 PROCEDURE — 3074F SYST BP LT 130 MM HG: CPT | Mod: CPTII,S$GLB,, | Performed by: ORTHOPAEDIC SURGERY

## 2023-08-10 PROCEDURE — 3078F PR MOST RECENT DIASTOLIC BLOOD PRESSURE < 80 MM HG: ICD-10-PCS | Mod: CPTII,S$GLB,, | Performed by: ORTHOPAEDIC SURGERY

## 2023-08-10 PROCEDURE — 99024 POSTOP FOLLOW-UP VISIT: CPT | Mod: S$GLB,,, | Performed by: ORTHOPAEDIC SURGERY

## 2023-08-10 PROCEDURE — 97760 ORTHOTIC MGMT&TRAING 1ST ENC: CPT | Mod: S$GLB,,, | Performed by: ORTHOPAEDIC SURGERY

## 2023-08-10 PROCEDURE — 97530 THERAPEUTIC ACTIVITIES: CPT | Performed by: PHYSICAL THERAPIST

## 2023-08-10 PROCEDURE — 97760 PR ORTHOTIC MGMT&TRAINJ INITIAL ENC EA 15 MINS: ICD-10-PCS | Mod: S$GLB,,, | Performed by: ORTHOPAEDIC SURGERY

## 2023-08-10 PROCEDURE — 3074F PR MOST RECENT SYSTOLIC BLOOD PRESSURE < 130 MM HG: ICD-10-PCS | Mod: CPTII,S$GLB,, | Performed by: ORTHOPAEDIC SURGERY

## 2023-08-10 PROCEDURE — 1159F MED LIST DOCD IN RCRD: CPT | Mod: CPTII,S$GLB,, | Performed by: ORTHOPAEDIC SURGERY

## 2023-08-10 PROCEDURE — 99999 PR PBB SHADOW E&M-EST. PATIENT-LVL III: CPT | Mod: PBBFAC,,, | Performed by: ORTHOPAEDIC SURGERY

## 2023-08-10 PROCEDURE — 3008F BODY MASS INDEX DOCD: CPT | Mod: CPTII,S$GLB,, | Performed by: ORTHOPAEDIC SURGERY

## 2023-08-10 PROCEDURE — 99999 PR PBB SHADOW E&M-EST. PATIENT-LVL III: ICD-10-PCS | Mod: PBBFAC,,, | Performed by: ORTHOPAEDIC SURGERY

## 2023-08-10 PROCEDURE — 97112 NEUROMUSCULAR REEDUCATION: CPT | Performed by: PHYSICAL THERAPIST

## 2023-08-10 NOTE — PROGRESS NOTES
"CC: Right knee post op 6 weeks    Patient is here for her 6 week post op appointment s/p below and is doing well. Patient is doing PT at [facility] and is progressing as expected.     DATE OF PROCEDURE: 6/28/2023     PREOPERATIVE DIAGNOSES:   1. Right knee high grade medial collateral ligament (MCL) tear.      POSTOPERATIVE DIAGNOSES:   1. Right knee high grade medial collateral ligament (MCL) tear.      PROCEDURES:   1. Right knee MCL repair with internal brace  2. Diagnostic right knee arthroscopy        SURGEON: Lamine Early M.D.        PE:    /74   Pulse 72   Ht 5' 2" (1.575 m)   BMI 33.65 kg/m²      Right knee:    Incisions clean/dry/intact  No sign of infection  Mild swelling  Compartments soft  Neurovascular status intact in extremity    PROM 0 to 120 degrees  Decreased quad strength  Minimal to no effusion    Assessment:  6 weeks s/p right knee MCL repair with internal brace, diagnostic arthroscopy    Plan:  1.  Continue PT    2. [facility] - [id number] [address], performed a custom orthotic / brace adjustment, fitting and training with the patient. The patient demonstrated understanding and proper care. This was performed for 5 minutes.      3. RTC in 6 weeks.     All questions were answered. Instructed patient to call with questions or concerns in the interim.         "

## 2023-08-11 NOTE — PROGRESS NOTES
"[facility]   Physical Therapy Treatment Note      Name: Tato Godinez  Clinic Number: [id number]    Therapy Diagnosis:   Encounter Diagnosis   Name Primary?    Acute pain of right knee Yes     Physician: Lamine Early MD    Visit Date: 8/10/2023    Physician Orders: PT Eval and Treat   Medical Diagnosis from Referral: 3.411A (ICD-10-CM) - Tear of medial collateral ligament of right knee, initial encounter  Evaluation Date: 6/30/2023  Authorization Period Expiration: 7/30/2023  Plan of Care Expiration: 9/30/2023  Progress Note Due: 8/10/2023  Visit # / Visits authorized: 10/20     Time In: 1350  Time Out: 1500  Total Billable Time: 50 minutes    FOTO IE 6/30: 29  FOTO 2:  FOTO 3:    Precautions: Standard, post-op MCL repair protocol    Procedure by Sharath: 6/28/23   1. Right knee MCL repair with internal brace  2. Diagnostic right knee arthroscopy     The postop plan for the patient is to follow our MCL repair protocol.  Hinged knee brace locked in extension weightbear as tolerated for now.         Subjective     Pt reports: Follow up appointment with Dr. Early went well.     She was compliant with home exercise program.  Response to previous treatment: Improved mobility  Functional change: Improved gait    Pain: 2/10  Location: right knee      Objective      DOS: 6/28/23  POD: 5 weeks, 6 days as of 8/2    Observation: Presents in wheel chair with TSCOPE brace locked in extension, mild swelling. No signs of infection     Gait: Bilateral axillary crutches with WBAT     Knee Passive Range of Motion:    Right  Left    Flexion 110 130   Extension +2 +3        Treatment     Tato received the treatments listed below:      therapeutic exercises to develop ROM, strength, flexibility, endurance for 15 minutes including:    LLLD heel prop 8# x 5'  Ext hinge strap stretch 5 x 20"  Seated LAQ into HSC x 4'  Upright bike seat height 2, lvl 2 x 10' for joint mobility/flexion ROM  Pt " "education: reviewed HEP, crutch wean    NP:  Calf raises 20 x 10"  Heel slides x 3'      manual therapy techniques: joint mobilizations and/or soft tissue mobilizations were applied to the: knee for 04 minutes, including:     Assessment of joint mobility and ROM  Patellar mobilizations all planes IV  Fat pad mobs   Ext hinge mob  EOT PROM flx - np     neuromuscular re-education activities to improve: motor control, balance, muscle activation, proprioception, posture for 10 minutes. The following activities were included:    Quad sets c towel roll under calf/knee and strapA 10" hold x 3'  Quad sets c towel roll under calf/knee 10" hold x 3' - NP  Quad sets into hyperext 10" hold x 3' - np  Seated SLR 1# 3 x 8 x 3"  DL shuttle press 62.5# 4 x 15  SL shuttle press 37.5# 3 x 8  S/L hip ABD s brace 3 x 10 x 3" - np  LAQ 10 lbs 3 x 10    therapeutic activities to improve functional performance for 25 minutes, including:    Standing mini squat behind mat 3 x 10  Standing partial lunge behind mat 2 x 10  Standing TKE 2 x 10  Standing rocker for push off with gait 2 x 15    gait training to improve functional mobility and safety for 00  minutes, including:        Patient Education and Home Exercises       Education provided:   - Diagnosis and prognosis     Written Home Exercises Provided: yes. Exercises were reviewed and Tato was able to demonstrate them prior to the end of the session.  Tato demonstrated good  understanding of the education provided. See EMR under Patient Instructions for exercises provided during therapy sessions.    Assessment     Progressing very well. Focus was on closed chain knee strengthening and push off for gait. Did well.     Tato is progressing well towards her goals.   Pt prognosis is Excellent.     Pt will continue to benefit from skilled outpatient physical therapy to address the deficits listed in the problem list box on initial evaluation, provide pt/family education and to maximize " pt's level of independence in the home and community environment.     Pt's spiritual, cultural and educational needs considered and pt agreeable to plan of care and goals.     Anticipated barriers to physical therapy: None    GOALS: Short Term Goals:  6 weeks  1.Report decreased knee pain  < / =  3/10  to increase tolerance for exercise  2. Increase knee ROM to 125 degrees flexion in order to be able to perform ADLs without difficulty.  3. Increase strength by 1/3 MMT grade in quadriceps  to increase tolerance for ADL and work activities.  4. Pt to tolerate HEP to improve ROM and independence with ADL's     Long Term Goals: 24 weeks  1.Report decreased knee pain < / = 0/10  to increase tolerance for ADLs  2.Patient goal: Return to work in August  3.Increase strength to >/= 4+/5 in quadriceps  to increase tolerance for ADL and work activities.  4. Pt will report at CJ level (20-40% impaired) on FOTO knee to demonstrate increase in LE function with every day tasks.      Plan   Outpatient Physical Therapy 2 times weekly for 10 weeks to include the following interventions: manual therapy, therapeutic exercise, therapeutic activities, and neuromuscular re-education.    Gal Bran, PT

## 2023-08-14 ENCOUNTER — CLINICAL SUPPORT (OUTPATIENT)
Dept: REHABILITATION | Facility: HOSPITAL | Age: 43
End: 2023-08-14
Payer: COMMERCIAL

## 2023-08-14 DIAGNOSIS — M25.561 ACUTE PAIN OF RIGHT KNEE: Primary | ICD-10-CM

## 2023-08-14 PROCEDURE — 97530 THERAPEUTIC ACTIVITIES: CPT | Mod: CQ

## 2023-08-14 PROCEDURE — 97110 THERAPEUTIC EXERCISES: CPT | Mod: CQ

## 2023-08-14 PROCEDURE — 97112 NEUROMUSCULAR REEDUCATION: CPT | Mod: CQ

## 2023-08-14 NOTE — PROGRESS NOTES
"[facility]   Physical Therapy Treatment Note      Name: Tato Godinez  Clinic Number: [id number]    Therapy Diagnosis:   Encounter Diagnosis   Name Primary?    Acute pain of right knee Yes     Physician: Lamine Early MD    Visit Date: 8/14/2023    Physician Orders: PT Eval and Treat   Medical Diagnosis from Referral: 3.411A (ICD-10-CM) - Tear of medial collateral ligament of right knee, initial encounter  Evaluation Date: 6/30/2023  Authorization Period Expiration: 7/30/2023  Plan of Care Expiration: 9/30/2023  Progress Note Due: 8/10/2023  Visit # / Visits authorized: 12/20     Time In: 1302  Time Out: 1409  Total Billable Time: 56 minutes    FOTO IE 6/30: 29  FOTO 2:  FOTO 3:    Precautions: Standard, post-op MCL repair protocol    Procedure by Sharath: 6/28/23   1. Right knee MCL repair with internal brace  2. Diagnostic right knee arthroscopy     The postop plan for the patient is to follow our MCL repair protocol.  Hinged knee brace locked in extension weightbear as tolerated for now.         Subjective     Pt reports: work is going well. Her knee isn't as stiff. She isn't using her crutches.     She was compliant with home exercise program.  Response to previous treatment: Improved mobility  Functional change: Improved gait    Pain: not verbalized/10  Location: right knee      Objective      DOS: 6/28/23  POD: 6 weeks, 5 days as of 8/14    Observation: Presents in wheel chair with TSCOPE brace locked in extension, mild swelling. No signs of infection     Gait: Bilateral axillary crutches with WBAT     Knee Passive Range of Motion:    Right  Left    Flexion 125 130   Extension +2 +3        Treatment     Tato received the treatments listed below:      therapeutic exercises to develop ROM, strength, flexibility, endurance for 25 minutes including:    Upright bike seat height 2, lvl 2-4 x 10' for joint mobility/flexion ROM  Ext hinge strap stretch 5 x 20"  Waddles YTB x 1 " "turf lap  Heel slides x 4'   Pt education: reviewed HEP and modifications for work    NP:  Calf raises 20 x 10"  LLLD heel prop 8# x 5'  Seated LAQ into HSC x 4'     manual therapy techniques: joint mobilizations and/or soft tissue mobilizations were applied to the: knee for 02 minutes, including:     Assessment of joint mobility and ROM  Patellar mobilizations all planes IV  Fat pad mobs   Ext hinge mob  EOT PROM flx - np     neuromuscular re-education activities to improve: motor control, balance, muscle activation, proprioception, posture for 13 minutes. The following activities were included:    Quad sets c towel roll under calf/knee and strapA 10" hold x 3'  Quad sets c towel roll under calf/knee 10" hold x 3'   Seated SLR 2# 3 x 8 x 3"    NP:  Quad sets into hyperext 10" hold x 3'   DL shuttle press 62.5# 4 x 15  SL shuttle press 37.5# 3 x 8  S/L hip ABD s brace 3 x 10 x 3"   LAQ 10 lbs 3 x 10    therapeutic activities to improve functional performance for 18 minutes, includinin forward step up into TKE GTB 3 x 10 (dowel assist)    DL squat retraining in front of mirror:   DL squats c 2in box under LLE 2 x 10  DL squats 2 x 10    NP:  Standing partial lunge behind mat 2 x 10  Standing rocker for push off with gait 2 x 15    gait training to improve functional mobility and safety for 00 minutes, including:        Patient Education and Home Exercises       Education provided:   - Diagnosis and prognosis     Written Home Exercises Provided: yes. Exercises were reviewed and Tato was able to demonstrate them prior to the end of the session.  Tato demonstrated good  understanding of the education provided. See EMR under Patient Instructions for exercises provided during therapy sessions.    Assessment     Tato did great today. Flexion ROM increased since previous tx. She required verbal and visual cueing for equal WB during DL squats; form improved p 2in step was placed under uninvolved LE. Good " tolerance to initiation of step ups with TKE. Fatigue reported during resisted lateral walks with rest breaks required. No adverse effects reported p txEdna Godwin is progressing well towards her goals.   Pt prognosis is Excellent.     Pt will continue to benefit from skilled outpatient physical therapy to address the deficits listed in the problem list box on initial evaluation, provide pt/family education and to maximize pt's level of independence in the home and community environment.     Pt's spiritual, cultural and educational needs considered and pt agreeable to plan of care and goals.     Anticipated barriers to physical therapy: None    GOALS: Short Term Goals:  6 weeks  1.Report decreased knee pain  < / =  3/10  to increase tolerance for exercise  2. Increase knee ROM to 125 degrees flexion in order to be able to perform ADLs without difficulty.  3. Increase strength by 1/3 MMT grade in quadriceps  to increase tolerance for ADL and work activities.  4. Pt to tolerate HEP to improve ROM and independence with ADL's     Long Term Goals: 24 weeks  1.Report decreased knee pain < / = 0/10  to increase tolerance for ADLs  2.Patient goal: Return to work in August  3.Increase strength to >/= 4+/5 in quadriceps  to increase tolerance for ADL and work activities.  4. Pt will report at CJ level (20-40% impaired) on FOTO knee to demonstrate increase in LE function with every day tasks.      Plan   Outpatient Physical Therapy 2 times weekly for 10 weeks to include the following interventions: manual therapy, therapeutic exercise, therapeutic activities, and neuromuscular re-education.    Monik Hope, PTA

## 2023-08-16 ENCOUNTER — CLINICAL SUPPORT (OUTPATIENT)
Dept: REHABILITATION | Facility: HOSPITAL | Age: 43
End: 2023-08-16
Payer: COMMERCIAL

## 2023-08-16 DIAGNOSIS — M25.561 ACUTE PAIN OF RIGHT KNEE: Primary | ICD-10-CM

## 2023-08-16 PROCEDURE — 97112 NEUROMUSCULAR REEDUCATION: CPT | Mod: CQ

## 2023-08-16 PROCEDURE — 97530 THERAPEUTIC ACTIVITIES: CPT | Mod: CQ

## 2023-08-16 PROCEDURE — 97110 THERAPEUTIC EXERCISES: CPT | Mod: CQ

## 2023-08-16 NOTE — PROGRESS NOTES
"[facility]   Physical Therapy Treatment Note      Name: Tato Godinez  Clinic Number: [id number]    Therapy Diagnosis:   Encounter Diagnosis   Name Primary?    Acute pain of right knee Yes     Physician: Lamine Early MD    Visit Date: 8/16/2023    Physician Orders: PT Eval and Treat   Medical Diagnosis from Referral: 3.411A (ICD-10-CM) - Tear of medial collateral ligament of right knee, initial encounter  Evaluation Date: 6/30/2023  Authorization Period Expiration: 7/30/2023  Plan of Care Expiration: 9/30/2023  Progress Note Due: 8/10/2023  Visit # / Visits authorized: 12/20     Time In: 1305  Time Out: 1412  Total Billable Time: 56 minutes    FOTO IE 6/30: 29  FOTO 2:  FOTO 3:    Precautions: Standard, post-op MCL repair protocol    Procedure by Sharath: 6/28/23   1. Right knee MCL repair with internal brace  2. Diagnostic right knee arthroscopy     The postop plan for the patient is to follow our MCL repair protocol.  Hinged knee brace locked in extension weightbear as tolerated for now.         Subjective     Pt reports: work is going well. Glute soreness p previous tx. Reports consistent compliance with HEP.     She was compliant with home exercise program.  Response to previous treatment: Improved mobility  Functional change: Improved gait    Pain: not verbalized/10  Location: right knee      Objective      DOS: 6/28/23  POD: 7 weeks as of 8/16    Observation: Presents in wheel chair with TSCOPE brace locked in extension, mild swelling. No signs of infection     Gait: Bilateral axillary crutches with WBAT     Knee Passive Range of Motion:    Right  Left    Flexion 125 130   Extension +2 +3        Treatment     Tato received the treatments listed below:      therapeutic exercises to develop ROM, strength, flexibility, endurance for 15 minutes including:    Upright bike lvl 2-4 x 10' for joint mobility/flexion ROM  Ext hinge strap stretch 5 x 20"  Pt education: reviewed " "HEP and modifications for work, issued theraloop for glute strengthening therex    Next tx - FOTO    NP:  LLLD heel prop 8# x 5'  Seated LAQ into HSC x 4'  Waddles YTB x 1 turf lap  Heel slides x 4'      manual therapy techniques: joint mobilizations and/or soft tissue mobilizations were applied to the: knee for 03 minutes, including:     Assessment of joint mobility and ROM  Patellar mobilizations all planes IV  Fat pad mobs   Ext hinge mob  EOT PROM flx - np     neuromuscular re-education activities to improve: motor control, balance, muscle activation, proprioception, posture for 23 minutes. The following activities were included:    Quad sets c strapA into hyperext 10 x 10"   Quad sets into hyperext 10 x 10"  Seated SLR 2# 3 x 8 x 3"  SL calf raises 3 x 10 naseem  LAQ GTB 4 x 10    NP:  DL shuttle press 62.5# 4 x 15  SL shuttle press 37.5# 3 x 8    therapeutic activities to improve functional performance for 18 minutes, includinin forward step up into TKE GTB 3 x 10 (dowel assist)    DL squat retraining in front of mirror:   DL squats to bench   DL squats to bench 10#kb 4 x 8    NP:  Standing partial lunge behind mat 2 x 10  Standing rocker for push off with gait 2 x 15    gait training to improve functional mobility and safety for 00 minutes, including:        Patient Education and Home Exercises       Education provided:   - Diagnosis and prognosis     Written Home Exercises Provided: yes. Exercises were reviewed and Tato was able to demonstrate them prior to the end of the session.  Tato demonstrated good  understanding of the education provided. See EMR under Patient Instructions for exercises provided during therapy sessions.    Assessment     Tato did great today. DL squats form continues to improve. She was challenged by SL calf raises. Good tolerance of initiation of resistance to [place] LAQ. Issued theraloop for HEP and encouraged continued compliance; pt voiced understanding. No adverse " effects reported p chris Godwin is progressing well towards her goals.   Pt prognosis is Excellent.     Pt will continue to benefit from skilled outpatient physical therapy to address the deficits listed in the problem list box on initial evaluation, provide pt/family education and to maximize pt's level of independence in the home and community environment.     Pt's spiritual, cultural and educational needs considered and pt agreeable to plan of care and goals.     Anticipated barriers to physical therapy: None    GOALS: Short Term Goals:  6 weeks  1.Report decreased knee pain  < / =  3/10  to increase tolerance for exercise  2. Increase knee ROM to 125 degrees flexion in order to be able to perform ADLs without difficulty.  3. Increase strength by 1/3 MMT grade in quadriceps  to increase tolerance for ADL and work activities.  4. Pt to tolerate HEP to improve ROM and independence with ADL's     Long Term Goals: 24 weeks  1.Report decreased knee pain < / = 0/10  to increase tolerance for ADLs  2.Patient goal: Return to work in August  3.Increase strength to >/= 4+/5 in quadriceps  to increase tolerance for ADL and work activities.  4. Pt will report at CJ level (20-40% impaired) on FOTO knee to demonstrate increase in LE function with every day tasks.      Plan   Outpatient Physical Therapy 2 times weekly for 10 weeks to include the following interventions: manual therapy, therapeutic exercise, therapeutic activities, and neuromuscular re-education.    Monik Hope, PTA

## 2023-08-21 ENCOUNTER — PATIENT MESSAGE (OUTPATIENT)
Dept: RESEARCH | Facility: HOSPITAL | Age: 43
End: 2023-08-21
Payer: COMMERCIAL

## 2023-08-22 ENCOUNTER — CLINICAL SUPPORT (OUTPATIENT)
Dept: REHABILITATION | Facility: HOSPITAL | Age: 43
End: 2023-08-22
Payer: COMMERCIAL

## 2023-08-22 DIAGNOSIS — M25.561 ACUTE PAIN OF RIGHT KNEE: Primary | ICD-10-CM

## 2023-08-22 PROCEDURE — 97530 THERAPEUTIC ACTIVITIES: CPT | Performed by: PHYSICAL THERAPIST

## 2023-08-22 PROCEDURE — 97110 THERAPEUTIC EXERCISES: CPT | Performed by: PHYSICAL THERAPIST

## 2023-08-22 PROCEDURE — 97112 NEUROMUSCULAR REEDUCATION: CPT | Performed by: PHYSICAL THERAPIST

## 2023-08-22 NOTE — PROGRESS NOTES
"[facility]   Physical Therapy Treatment Note      Name: Tato Godinez  Clinic Number: [id number]    Therapy Diagnosis:   Encounter Diagnosis   Name Primary?    Acute pain of right knee Yes     Physician: Lamine Early MD    Visit Date: 8/22/2023    Physician Orders: PT Eval and Treat   Medical Diagnosis from Referral: 3.411A (ICD-10-CM) - Tear of medial collateral ligament of right knee, initial encounter  Evaluation Date: 6/30/2023  Authorization Period Expiration: 7/30/2023  Plan of Care Expiration: 9/30/2023  Progress Note Due: 8/10/2023  Visit # / Visits authorized: 14/20     Time In: 1411  Time Out: 1505  Total Billable Time: 53 minutes    FOTO IE 6/30: 29  FOTO 2:8/22  FOTO 3:    Precautions: Standard, post-op MCL repair protocol    Procedure by Sharath: 6/28/23   1. Right knee MCL repair with internal brace  2. Diagnostic right knee arthroscopy     The postop plan for the patient is to follow our MCL repair protocol.  Hinged knee brace locked in extension weightbear as tolerated for now.         Subjective     Pt reports: Ankle swelling since she has gone back to work. Otherwise doing very well.    She was compliant with home exercise program.  Response to previous treatment: Improved mobility  Functional change: Improved gait    Pain: not verbalized/10  Location: right knee      Objective      DOS: 6/28/23  POD: 8 weeks 6 days as of 8/22    Observation: Presents in wheel chair with TSCOPE brace locked in extension, mild swelling. No signs of infection     Gait: Bilateral axillary crutches with WBAT     Knee Passive Range of Motion:    Right  Left    Flexion 125 130   Extension +2 +3        Treatment     Tato received the treatments listed below:      therapeutic exercises to develop ROM, strength, flexibility, endurance for 15 minutes including:    Upright bike lvl 2-4 x 10' for joint mobility/flexion ROM  Ext hinge strap stretch 5 x 20"  Pt education: reviewed HEP " "and modifications for work, issued theraloop for glute strengthening therex    Next tx - FOTO    NP:  LLLD heel prop 8# x 5'  Seated LAQ into HSC x 4'  Waddles YTB x 1 turf lap  Heel slides x 4'      manual therapy techniques: joint mobilizations and/or soft tissue mobilizations were applied to the: knee for 03 minutes, including:     Assessment of joint mobility and ROM  Patellar mobilizations all planes IV  Fat pad mobs   Ext hinge mob  EOT PROM flx - np     neuromuscular re-education activities to improve: motor control, balance, muscle activation, proprioception, posture for 23 minutes. The following activities were included:    Quad sets c strapA into hyperext 10 x 10"   Quad sets into hyperext 10 x 10"  Seated SLR 2# 3 x 8 x 3"  SL calf raises 3 x 10 naseem  LAQ GTB 4 x 10    NP:  DL shuttle press 62.5# 4 x 15  SL shuttle press 37.5# 3 x 8    therapeutic activities to improve functional performance for 18 minutes, includinin forward step up into TKE GTB 3 x 10 (dowel assist)    DL squat retraining wobble board  DL squats to bench   DL squats to bench 10#kb 4 x 8      NP:  Standing partial lunge behind mat 2 x 10  Standing rocker for push off with gait 2 x 15    gait training to improve functional mobility and safety for 00 minutes, including:        Patient Education and Home Exercises       Education provided:   - Diagnosis and prognosis     Written Home Exercises Provided: yes. Exercises were reviewed and Tato was able to demonstrate them prior to the end of the session.  Tato demonstrated good  understanding of the education provided. See EMR under Patient Instructions for exercises provided during therapy sessions.    Assessment     Focused on closed chain strengthening. HEP updated for step down. Doing well. Continue with POC.     Tato is progressing well towards her goals.   Pt prognosis is Excellent.     Pt will continue to benefit from skilled outpatient physical therapy to address the " deficits listed in the problem list box on initial evaluation, provide pt/family education and to maximize pt's level of independence in the home and community environment.     Pt's spiritual, cultural and educational needs considered and pt agreeable to plan of care and goals.     Anticipated barriers to physical therapy: None    GOALS: Short Term Goals:  6 weeks  1.Report decreased knee pain  < / =  3/10  to increase tolerance for exercise  2. Increase knee ROM to 125 degrees flexion in order to be able to perform ADLs without difficulty.  3. Increase strength by 1/3 MMT grade in quadriceps  to increase tolerance for ADL and work activities.  4. Pt to tolerate HEP to improve ROM and independence with ADL's     Long Term Goals: 24 weeks  1.Report decreased knee pain < / = 0/10  to increase tolerance for ADLs  2.Patient goal: Return to work in August  3.Increase strength to >/= 4+/5 in quadriceps  to increase tolerance for ADL and work activities.  4. Pt will report at CJ level (20-40% impaired) on FOTO knee to demonstrate increase in LE function with every day tasks.      Plan   Outpatient Physical Therapy 2 times weekly for 10 weeks to include the following interventions: manual therapy, therapeutic exercise, therapeutic activities, and neuromuscular re-education.    Gal Bran, PT, DPT

## 2023-08-29 ENCOUNTER — CLINICAL SUPPORT (OUTPATIENT)
Dept: REHABILITATION | Facility: HOSPITAL | Age: 43
End: 2023-08-29
Payer: COMMERCIAL

## 2023-08-29 DIAGNOSIS — M25.561 ACUTE PAIN OF RIGHT KNEE: Primary | ICD-10-CM

## 2023-08-29 PROCEDURE — 97110 THERAPEUTIC EXERCISES: CPT | Performed by: PHYSICAL THERAPIST

## 2023-08-29 PROCEDURE — 97530 THERAPEUTIC ACTIVITIES: CPT | Performed by: PHYSICAL THERAPIST

## 2023-08-29 PROCEDURE — 97112 NEUROMUSCULAR REEDUCATION: CPT | Performed by: PHYSICAL THERAPIST

## 2023-09-01 ENCOUNTER — CLINICAL SUPPORT (OUTPATIENT)
Dept: REHABILITATION | Facility: HOSPITAL | Age: 43
End: 2023-09-01
Payer: COMMERCIAL

## 2023-09-01 DIAGNOSIS — M25.561 ACUTE PAIN OF RIGHT KNEE: Primary | ICD-10-CM

## 2023-09-01 PROCEDURE — 97110 THERAPEUTIC EXERCISES: CPT | Mod: CQ

## 2023-09-01 PROCEDURE — 97112 NEUROMUSCULAR REEDUCATION: CPT | Mod: CQ

## 2023-09-01 PROCEDURE — 97530 THERAPEUTIC ACTIVITIES: CPT | Mod: CQ

## 2023-09-01 NOTE — PROGRESS NOTES
"[facility]   Physical Therapy Treatment Note      Name: Tato Godinez  Clinic Number: [id number]    Therapy Diagnosis:   Encounter Diagnosis   Name Primary?    Acute pain of right knee Yes     Physician: Lamine Early MD    Visit Date: 9/1/2023    Physician Orders: PT Eval and Treat   Medical Diagnosis from Referral: 3.411A (ICD-10-CM) - Tear of medial collateral ligament of right knee, initial encounter  Evaluation Date: 6/30/2023  Authorization Period Expiration: 7/30/2023  Plan of Care Expiration: 9/30/2023  Progress Note Due: 8/10/2023  Visit # / Visits authorized: 16/20     Time In: 1350  Time Out: 1445  Total Billable Time: 39 minutes    FOTO IE 6/30: 29  FOTO 2:8/22  FOTO 3:    Precautions: Standard, post-op MCL repair protocol    Procedure by Sharath: 6/28/23   1. Right knee MCL repair with internal brace  2. Diagnostic right knee arthroscopy     The postop plan for the patient is to follow our MCL repair protocol.  Hinged knee brace locked in extension weightbear as tolerated for now.         Subjective     Pt reports: no new complaints. She's doing well today.     She was compliant with home exercise program.  Response to previous treatment: Improved mobility  Functional change: Improved gait    Pain: not verbalized/10  Location: right knee      Objective      DOS: 6/28/23  POD: 9 weeks, 2 days as of 9/1    Observation: Presents in wheel chair with TSCOPE brace locked in extension, mild swelling. No signs of infection     Gait: Bilateral axillary crutches with WBAT     Knee Passive Range of Motion:    Right  Left    Flexion 125 125   Extension +2 +3        +Raul for 2 joint musculature and lateral hip stiffness    Don testing 70 degrees: L 46.4 kgs, 33.5 kgs    Treatment     Tato received the treatments listed below:      therapeutic exercises to develop ROM, strength, flexibility, endurance for 12 minutes including:    Ext hinge strap stretch 5 x 20"  Waddles " "YTB x 2 turf laps    NP:  LLLD heel prop 8# x 5'  Seated LAQ into HSC x 4'  Heel slides x 4'      manual therapy techniques: joint mobilizations and/or soft tissue mobilizations were applied to the: knee for 03 minutes, including:     Assessment of joint mobility and ROM  Patellar mobilizations all planes IV  Fat pad mobs   Ext hinge mob  EOT PROM flx - np     neuromuscular re-education activities to improve: motor control, balance, muscle activation, proprioception, posture for 12 minutes. The following activities were included:    Quad sets into hyperext 10 x 10"  Seated SLR 2# 2 x 8 x 3"    NP:  DL shuttle press 62.5# 4 x 15  SL shuttle press 37.5# 3 x 8  SL calf raises 3 x 10 naseem  LAQ GTB 4 x 10    therapeutic activities to improve functional performance for 26 minutes, including:    Upright bike lvl 2-4 x 10' for joint mobility/flexion ROM  DL squats to bench 15#kb 3 x 15  Split squats c UE support 2 x 10 naseem   6in forward step up into TKE GTB 3 x 10 (dowel assist) - np    gait training to improve functional mobility and safety for 00 minutes, including:        Patient Education and Home Exercises       Education provided:   - Diagnosis and prognosis     Written Home Exercises Provided: yes. Exercises were reviewed and Tato was able to demonstrate them prior to the end of the session.  Tato demonstrated good  understanding of the education provided. See EMR under Patient Instructions for exercises provided during therapy sessions.    Assessment     Tato did great today. She was able to progress DL squat load with good form; very slight L weight shift noted at max depth. Good tolerance of initiation of split squats today with good hip control/no hip drop noted. She reported max fatigue during progressed lateral walks. Encouraged continued compliance with HEP over the long weekend; pt voiced understanding. No adverse effects reported p tx.     Tato is progressing well towards her goals.   Pt prognosis " is Excellent.     Pt will continue to benefit from skilled outpatient physical therapy to address the deficits listed in the problem list box on initial evaluation, provide pt/family education and to maximize pt's level of independence in the home and community environment.     Pt's spiritual, cultural and educational needs considered and pt agreeable to plan of care and goals.     Anticipated barriers to physical therapy: None    GOALS: Short Term Goals:  6 weeks  1.Report decreased knee pain  < / =  3/10  to increase tolerance for exercise  2. Increase knee ROM to 125 degrees flexion in order to be able to perform ADLs without difficulty.  3. Increase strength by 1/3 MMT grade in quadriceps  to increase tolerance for ADL and work activities.  4. Pt to tolerate HEP to improve ROM and independence with ADL's     Long Term Goals: 24 weeks  1.Report decreased knee pain < / = 0/10  to increase tolerance for ADLs  2.Patient goal: Return to work in August  3.Increase strength to >/= 4+/5 in quadriceps  to increase tolerance for ADL and work activities.  4. Pt will report at CJ level (20-40% impaired) on FOTO knee to demonstrate increase in LE function with every day tasks.      Plan   Outpatient Physical Therapy 2 times weekly for 10 weeks to include the following interventions: manual therapy, therapeutic exercise, therapeutic activities, and neuromuscular re-education.    Monik Hope, PTA

## 2023-09-05 ENCOUNTER — CLINICAL SUPPORT (OUTPATIENT)
Dept: REHABILITATION | Facility: HOSPITAL | Age: 43
End: 2023-09-05
Payer: COMMERCIAL

## 2023-09-05 DIAGNOSIS — M25.561 ACUTE PAIN OF RIGHT KNEE: Primary | ICD-10-CM

## 2023-09-05 PROCEDURE — 97140 MANUAL THERAPY 1/> REGIONS: CPT

## 2023-09-05 PROCEDURE — 97110 THERAPEUTIC EXERCISES: CPT

## 2023-09-05 PROCEDURE — 97112 NEUROMUSCULAR REEDUCATION: CPT

## 2023-09-06 NOTE — PROGRESS NOTES
[facility]   Physical Therapy Treatment Note      Name: Tato Godinez  Clinic Number: [id number]    Therapy Diagnosis:   Encounter Diagnosis   Name Primary?    Acute pain of right knee Yes     Physician: Lamine Early MD    Visit Date: 9/5/2023    Physician Orders: PT Eval and Treat   Medical Diagnosis from Referral: 3.411A (ICD-10-CM) - Tear of medial collateral ligament of right knee, initial encounter  Evaluation Date: 6/30/2023  Authorization Period Expiration: 7/30/2023  Plan of Care Expiration: 9/30/2023  Progress Note Due: 8/10/2023  Visit # / Visits authorized: 17/20     Time In: 1400  Time Out: 1450  Total Billable Time: 50 minutes    FOTO IE 6/30: 29  FOTO 2: 8/22: 66  FOTO 3:    Precautions: Standard, post-op MCL repair protocol    Procedure by Sharath: 6/28/23   1. Right knee MCL repair with internal brace  2. Diagnostic right knee arthroscopy     The postop plan for the patient is to follow our MCL repair protocol.  Hinged knee brace locked in extension weightbear as tolerated for now.         Subjective     Pt reports: she is doing well. 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    She was compliant with home exercise program.  Response to previous treatment: mild-moderate quad soreness  Functional change: no significant change at this time    Pain: not verbalized/10  Location: right knee      Objective      DOS: 6/28/23  POD: 9 weeks, 6 days as of 9/5    Observation: Presents in wheel chair with TSCOPE brace locked in extension, mild swelling. No signs of infection     Gait: Bilateral axillary crutches with WBAT     Knee Passive Range of Motion:    Right  Left    Flexion 125 125   Extension +2 +3        +Raul for 2 joint musculature and lateral hip stiffness    Don testing 70 degrees: L 46.4 kgs, 33.5 kgs    Treatment     Tato received the treatments listed below:      therapeutic exercises to develop ROM, strength,  "flexibility, endurance for 20 minutes including:    Hammer strength knee extension: 10#, 3x10 ea, VC to control eccentric phase  DL leg press machine: 150#, 3x10    NP:  Ext hinge strap stretch 5 x 20"  Waddles YTB x 2 turf laps  LLLD heel prop 8# x 5'  Seated LAQ into HSC x 4'  Heel slides x 4'      manual therapy techniques: joint mobilizations and/or soft tissue mobilizations were applied to the: knee for 15 minutes, including:     Assessment of joint mobility and ROM  Patellar mobilizations all planes IV  Infrapatellar fat pad mobs   Gr III-IV posterior tibiofemoral glide with tibial internal rotation      neuromuscular re-education activities to improve: motor control, balance, muscle activation, proprioception, posture for 15 minutes. The following activities were included:    Quad sets into hyperext 10 x 10"  Supine SLR: 2 x 15 x 3"    NP:  DL shuttle press 62.5# 4 x 15  SL shuttle press 37.5# 3 x 8  SL calf raises 3 x 10 naseem  LAQ GTB 4 x 10    therapeutic activities to improve functional performance for 0 minutes, including:    NP:  Upright bike lvl 2-4 x 10' for joint mobility/flexion ROM  DL squats to bench 15#kb 3 x 15  Split squats c UE support 2 x 10 naseem   6in forward step up into TKE GTB 3 x 10 (dowel assist) - np    gait training to improve functional mobility and safety for 00 minutes, including:        Patient Education and Home Exercises       Education provided:   - HEP     Written Home Exercises Provided: Continue prior HEP. Exercises were reviewed and Tato was able to demonstrate them prior to the end of the session.  Tato demonstrated good  understanding of the education provided. See EMR under Patient Instructions for exercises provided during therapy sessions.    Assessment     Bentleyjose g is motivated and tolerated today's session well. Pt demo'ed slight decrease in right knee flexion range of motion compared to left, which improved with posterior tibiofemoral glides in combination with " tibial internal rotation mobilizations. Good performance of closed chain strengthening on leg press machine.    Tato is progressing well towards her goals.   Pt prognosis is Excellent.     Pt will continue to benefit from skilled outpatient physical therapy to address the deficits listed in the problem list box on initial evaluation, provide pt/family education and to maximize pt's level of independence in the home and community environment.     Pt's spiritual, cultural and educational needs considered and pt agreeable to plan of care and goals.     Anticipated barriers to physical therapy: None    GOALS: Short Term Goals:  6 weeks  1.Report decreased knee pain  < / =  3/10  to increase tolerance for exercise  2. Increase knee ROM to 125 degrees flexion in order to be able to perform ADLs without difficulty.  3. Increase strength by 1/3 MMT grade in quadriceps  to increase tolerance for ADL and work activities.  4. Pt to tolerate HEP to improve ROM and independence with ADL's     Long Term Goals: 24 weeks  1.Report decreased knee pain < / = 0/10  to increase tolerance for ADLs  2.Patient goal: Return to work in August  3.Increase strength to >/= 4+/5 in quadriceps  to increase tolerance for ADL and work activities.  4. Pt will report at CJ level (20-40% impaired) on FOTO knee to demonstrate increase in LE function with every day tasks.      Plan   Outpatient Physical Therapy 2 times weekly for 10 weeks to include the following interventions: manual therapy, therapeutic exercise, therapeutic activities, and neuromuscular re-education.    Gal Bran, PT, DPT    Co-treating therapist: Tru Koroma, PT, DPT

## 2023-09-08 ENCOUNTER — CLINICAL SUPPORT (OUTPATIENT)
Dept: REHABILITATION | Facility: HOSPITAL | Age: 43
End: 2023-09-08
Payer: COMMERCIAL

## 2023-09-08 DIAGNOSIS — M25.561 ACUTE PAIN OF RIGHT KNEE: Primary | ICD-10-CM

## 2023-09-08 PROCEDURE — 97112 NEUROMUSCULAR REEDUCATION: CPT | Mod: CQ

## 2023-09-08 PROCEDURE — 97140 MANUAL THERAPY 1/> REGIONS: CPT | Mod: CQ

## 2023-09-08 PROCEDURE — 97110 THERAPEUTIC EXERCISES: CPT | Mod: CQ

## 2023-09-08 NOTE — PROGRESS NOTES
[facility]   Physical Therapy Treatment Note      Name: Tato Godinez  Clinic Number: [id number]    Therapy Diagnosis:   Encounter Diagnosis   Name Primary?    Acute pain of right knee Yes     Physician: Lamine Early MD    Visit Date: 9/8/2023    Physician Orders: PT Eval and Treat   Medical Diagnosis from Referral: 3.411A (ICD-10-CM) - Tear of medial collateral ligament of right knee, initial encounter  Evaluation Date: 6/30/2023  Authorization Period Expiration: 7/30/2023  Plan of Care Expiration: 9/30/2023  Progress Note Due: 8/10/2023  Visit # / Visits authorized: 18/20     Time In: 1307  Time Out: 1405  Total Billable Time: 55 minutes    FOTO IE 6/30: 29  FOTO 2: 8/22: 66  FOTO 3:    Precautions: Standard, post-op MCL repair protocol    Procedure by Sharath: 6/28/23   1. Right knee MCL repair with internal brace  2. Diagnostic right knee arthroscopy     The postop plan for the patient is to follow our MCL repair protocol.  Hinged knee brace locked in extension weightbear as tolerated for now.    Subjective     Pt reports: no new complaints.     She was compliant with home exercise program.  Response to previous treatment: mild-moderate quad soreness  Functional change: no significant change at this time    Pain: not verbalized/10  Location: right knee      Objective      DOS: 6/28/23  POD: 10 weeks, 2 days as of 9/8    Observation: Presents in wheel chair with TSCOPE brace locked in extension, mild swelling. No signs of infection     Gait: Bilateral axillary crutches with WBAT     Knee Passive Range of Motion:    Right  Left    Flexion 125 125   Extension +2 +3      +Raul for 2 joint musculature and lateral hip stiffness    Don testing 70 degrees: L 46.4 kgs, 33.5 kgs    Treatment     Tato received the treatments listed below:      therapeutic exercises to develop ROM, strength, flexibility, endurance for 32 minutes including:    Upright bike lvl 4 x 8' for joint  "mobility/cardiovascular endurance  LAQ matrix 19# 4 x 8-10   DL leg press 150# 2 x 10  SL leg press 100# 2 x 10  Waddles GTB x 1 turf lap    NP:  Ext hinge strap stretch 5 x 20"  LLLD heel prop 8# x 5'  Seated LAQ into HSC x 4'  Heel slides x 4'      manual therapy techniques: joint mobilizations and/or soft tissue mobilizations were applied to the: knee for 08 minutes, including:     Assessment of joint mobility and ROM  Patellar mobilizations all planes IV  Infrapatellar fat pad mobs   Gr III-IV posterior tibiofemoral glide with tibial internal rotation      neuromuscular re-education activities to improve: motor control, balance, muscle activation, proprioception, posture for 15 minutes. The following activities were included:    Quad sets into hyperext c strapA 10 x 10"  Quad sets into hyperext 10 x 10"  Long sitting SLR 2# 3 x 8 x 3"    therapeutic activities to improve functional performance for 0 minutes, including:    NP:  DL squats to bench 15#kb 3 x 15  Split squats c UE support 2 x 10 naseem   6in forward step up into TKE GTB 3 x 10 (dowel assist) - np    gait training to improve functional mobility and safety for 00 minutes, including:        Patient Education and Home Exercises       Education provided:   - HEP     Written Home Exercises Provided: Continue prior HEP. Exercises were reviewed and Tato was able to demonstrate them prior to the end of the session.  Tato demonstrated good  understanding of the education provided. See EMR under Patient Instructions for exercises provided during therapy sessions.    Assessment     Tato did well today. She was able to progress both leg press and LAQ with no c/o increased pain. Continued fatigue during resisted lateral walks. No adverse effects reported p tx.     Tato is progressing well towards her goals.   Pt prognosis is Excellent.     Pt will continue to benefit from skilled outpatient physical therapy to address the deficits listed in the problem " list box on initial evaluation, provide pt/family education and to maximize pt's level of independence in the home and community environment.     Pt's spiritual, cultural and educational needs considered and pt agreeable to plan of care and goals.     Anticipated barriers to physical therapy: None    GOALS: Short Term Goals:  6 weeks  1.Report decreased knee pain  < / =  3/10  to increase tolerance for exercise  2. Increase knee ROM to 125 degrees flexion in order to be able to perform ADLs without difficulty.  3. Increase strength by 1/3 MMT grade in quadriceps  to increase tolerance for ADL and work activities.  4. Pt to tolerate HEP to improve ROM and independence with ADL's     Long Term Goals: 24 weeks  1.Report decreased knee pain < / = 0/10  to increase tolerance for ADLs  2.Patient goal: Return to work in August  3.Increase strength to >/= 4+/5 in quadriceps  to increase tolerance for ADL and work activities.  4. Pt will report at CJ level (20-40% impaired) on FOTO knee to demonstrate increase in LE function with every day tasks.      Plan     Outpatient Physical Therapy 2 times weekly for 10 weeks to include the following interventions: manual therapy, therapeutic exercise, therapeutic activities, and neuromuscular re-education.    Monik Hope, PTA

## 2023-09-12 ENCOUNTER — CLINICAL SUPPORT (OUTPATIENT)
Dept: REHABILITATION | Facility: HOSPITAL | Age: 43
End: 2023-09-12
Payer: COMMERCIAL

## 2023-09-12 DIAGNOSIS — M25.561 ACUTE PAIN OF RIGHT KNEE: Primary | ICD-10-CM

## 2023-09-12 PROCEDURE — 97530 THERAPEUTIC ACTIVITIES: CPT | Mod: CQ

## 2023-09-12 PROCEDURE — 97110 THERAPEUTIC EXERCISES: CPT | Mod: CQ

## 2023-09-12 PROCEDURE — 97112 NEUROMUSCULAR REEDUCATION: CPT | Mod: CQ

## 2023-09-12 NOTE — PROGRESS NOTES
"[facility]   Physical Therapy Treatment Note      Name: Tato Godinez  Clinic Number: [id number]    Therapy Diagnosis:   Encounter Diagnosis   Name Primary?    Acute pain of right knee Yes     Physician: Lamine Early MD    Visit Date: 9/12/2023    Physician Orders: PT Eval and Treat   Medical Diagnosis from Referral: 3.411A (ICD-10-CM) - Tear of medial collateral ligament of right knee, initial encounter  Evaluation Date: 6/30/2023  Authorization Period Expiration: 7/30/2023  Plan of Care Expiration: 9/30/2023  Progress Note Due: 8/10/2023  Visit # / Visits authorized: 19/20     Time In: 1255  Time Out: 1400  Total Billable Time: 54 minutes    FOTO IE 6/30: 29  FOTO 2: 8/22: 66  FOTO 3:    Precautions: Standard, post-op MCL repair protocol    Procedure by Sharath: 6/28/23   1. Right knee MCL repair with internal brace  2. Diagnostic right knee arthroscopy     The postop plan for the patient is to follow our MCL repair protocol.  Hinged knee brace locked in extension weightbear as tolerated for now.    Subjective     Pt reports: no new complaints.     She was compliant with home exercise program.  Response to previous treatment: mild-moderate quad soreness  Functional change: no significant change at this time    Pain: not verbalized/10  Location: right knee      Objective      DOS: 6/28/23  POD: 10 weeks, 6 days as of 9/12    Knee Passive Range of Motion:    Right  Left    Flexion 125 125   Extension +2 +3      +Raul for 2 joint musculature and lateral hip stiffness    Don testing 70 degrees: L 46.4 kgs, 33.5 kgs    Treatment     Tato received the treatments listed below:      therapeutic exercises to develop ROM, strength, flexibility, endurance for 15 minutes including:    LAQ matrix 19# 4 x 10   Waddles GTB x 1 turf lap    NP:  Ext hinge strap stretch 5 x 20"  LLLD heel prop 8# x 5'  Heel slides x 4'   DL leg press 150# 2 x 10  SL leg press 100# 2 x 10     manual " "therapy techniques: joint mobilizations and/or soft tissue mobilizations were applied to the: knee for 02 minutes, including:     Assessment of joint mobility and ROM  Patellar mobilizations all planes IV  Infrapatellar fat pad mobs   Gr III-IV posterior tibiofemoral glide with tibial internal rotation      neuromuscular re-education activities to improve: motor control, balance, muscle activation, proprioception, posture for 23 minutes. The following activities were included:    Quad sets into hyperext c strapA 10 x 10"  Quad sets into hyperext 10 x 10"  Long sitting SLR 2# 3 x 8 x 3"  9in HS bridges 5" hold 3x to fatigue    therapeutic activities to improve functional performance for 23 minutes, including:    Upright bike lvl 4 x 8' for joint mobility/cardiovascular endurance  DL squats to bench 26#kb 4 x 10  Split squats c UE support 3 x 12 naseem   6in forward step up into TKE GTB 3 x 10 (dowel assist) - np    gait training to improve functional mobility and safety for 00 minutes, including:        Patient Education and Home Exercises       Education provided:   - HEP     Written Home Exercises Provided: Continue prior HEP. Exercises were reviewed and Tato was able to demonstrate them prior to the end of the session.  Tato demonstrated good  understanding of the education provided. See EMR under Patient Instructions for exercises provided during therapy sessions.    Assessment     Tato did well today. She was able to progress DL squat load with min cueing for equal WB at max depth. Max difficulty during HS bridges. She could benefit from further HS strengthening. No adverse effects reported p tx.     Tato is progressing well towards her goals.   Pt prognosis is Excellent.     Pt will continue to benefit from skilled outpatient physical therapy to address the deficits listed in the problem list box on initial evaluation, provide pt/family education and to maximize pt's level of independence in the home " and community environment.     Pt's spiritual, cultural and educational needs considered and pt agreeable to plan of care and goals.     Anticipated barriers to physical therapy: None    GOALS: Short Term Goals:  6 weeks  1.Report decreased knee pain  < / =  3/10  to increase tolerance for exercise  2. Increase knee ROM to 125 degrees flexion in order to be able to perform ADLs without difficulty.  3. Increase strength by 1/3 MMT grade in quadriceps  to increase tolerance for ADL and work activities.  4. Pt to tolerate HEP to improve ROM and independence with ADL's     Long Term Goals: 24 weeks  1.Report decreased knee pain < / = 0/10  to increase tolerance for ADLs  2.Patient goal: Return to work in August  3.Increase strength to >/= 4+/5 in quadriceps  to increase tolerance for ADL and work activities.  4. Pt will report at CJ level (20-40% impaired) on FOTO knee to demonstrate increase in LE function with every day tasks.      Plan     Outpatient Physical Therapy 2 times weekly for 10 weeks to include the following interventions: manual therapy, therapeutic exercise, therapeutic activities, and neuromuscular re-education.    Monik Hope, PTA

## 2023-09-15 ENCOUNTER — CLINICAL SUPPORT (OUTPATIENT)
Dept: REHABILITATION | Facility: HOSPITAL | Age: 43
End: 2023-09-15
Payer: COMMERCIAL

## 2023-09-15 DIAGNOSIS — M25.561 ACUTE PAIN OF RIGHT KNEE: Primary | ICD-10-CM

## 2023-09-15 PROCEDURE — 97530 THERAPEUTIC ACTIVITIES: CPT | Mod: CQ

## 2023-09-15 PROCEDURE — 97112 NEUROMUSCULAR REEDUCATION: CPT | Mod: CQ

## 2023-09-15 PROCEDURE — 97110 THERAPEUTIC EXERCISES: CPT | Mod: CQ

## 2023-09-15 NOTE — PROGRESS NOTES
"[facility]   Physical Therapy Treatment Note      Name: Tato Godinez  Clinic Number: [id number]    Therapy Diagnosis:   Encounter Diagnosis   Name Primary?    Acute pain of right knee Yes     Physician: Lamine Early MD    Visit Date: 9/15/2023    Physician Orders: PT Eval and Treat   Medical Diagnosis from Referral: 3.411A (ICD-10-CM) - Tear of medial collateral ligament of right knee, initial encounter  Evaluation Date: 6/30/2023  Authorization Period Expiration: 7/30/2023  Plan of Care Expiration: 9/30/2023  Progress Note Due: 8/10/2023  Visit # / Visits authorized: 20/20     Time In: 1310  Time Out: 1412  Total Billable Time: 54 minutes    FOTO IE 6/30: 29  FOTO 2: 8/22: 66  FOTO 3:    Precautions: Standard, post-op MCL repair protocol    Procedure by Sharath: 6/28/23   1. Right knee MCL repair with internal brace  2. Diagnostic right knee arthroscopy     The postop plan for the patient is to follow our MCL repair protocol.  Hinged knee brace locked in extension weightbear as tolerated for now.    Subjective     Pt reports: no new complaints.     She was compliant with home exercise program.  Response to previous treatment: mild-moderate quad soreness  Functional change: no significant change at this time    Pain: not verbalized/10  Location: right knee      Objective      DOS: 6/28/23  POD: 10 weeks, 6 days as of 9/12    Knee Passive Range of Motion:    Right  Left    Flexion 125 125   Extension +2 +3      +Raul for 2 joint musculature and lateral hip stiffness    Don testing 70 degrees: L 46.4 kgs, 33.5 kgs    Treatment     Tato received the treatments listed below:      therapeutic exercises to develop ROM, strength, flexibility, endurance for 15 minutes including:    Ext hinge strap stretch 5 x 20"  LAQ matrix 19# 4 x 10   Waddles GTB x 2 turf laps    NP:  LLLD heel prop 8# x 5'  Heel slides x 4'   DL leg press 150# 2 x 10  SL leg press 100# 2 x 10     manual " "therapy techniques: joint mobilizations and/or soft tissue mobilizations were applied to the: knee for 02 minutes, including:     Assessment of joint mobility and ROM  Patellar mobilizations all planes IV  Infrapatellar fat pad mobs   Gr III-IV posterior tibiofemoral glide with tibial internal rotation      neuromuscular re-education activities to improve: motor control, balance, muscle activation, proprioception, posture for 20 minutes. The following activities were included:    Quad sets into hyperext c strapA 10 x 10"  Quad sets into hyperext 10 x 10"  Long sitting SLR 2# 3 x 8 x 3"  9in HS bridges 3 x 8 x 5"     therapeutic activities to improve functional performance for 26 minutes, including:    Upright bike lvl 4 x 8' for joint mobility/cardiovascular endurance  DL squats to bench 26#kb 4 x 10  Split squats c UE support 3 x 12 naseem   20in RLE bias SL STS 3 x 6 naseem   6in forward step up into TKE GTB 3 x 10 (dowel assist) - np    gait training to improve functional mobility and safety for 00 minutes, including:        Patient Education and Home Exercises       Education provided:   - HEP     Written Home Exercises Provided: Continue prior HEP. Exercises were reviewed and Tato was able to demonstrate them prior to the end of the session.  Tato demonstrated good  understanding of the education provided. See EMR under Patient Instructions for exercises provided during therapy sessions.    Assessment     Tato did well today. She was very challenged by initiation of modified SL STS, but form improved with further reps/cueing. No adverse effects reported p tx.     Tato is progressing well towards her goals.   Pt prognosis is Excellent.     Pt will continue to benefit from skilled outpatient physical therapy to address the deficits listed in the problem list box on initial evaluation, provide pt/family education and to maximize pt's level of independence in the home and community environment.     Pt's " spiritual, cultural and educational needs considered and pt agreeable to plan of care and goals.     Anticipated barriers to physical therapy: None    GOALS: Short Term Goals:  6 weeks  1.Report decreased knee pain  < / =  3/10  to increase tolerance for exercise  2. Increase knee ROM to 125 degrees flexion in order to be able to perform ADLs without difficulty.  3. Increase strength by 1/3 MMT grade in quadriceps  to increase tolerance for ADL and work activities.  4. Pt to tolerate HEP to improve ROM and independence with ADL's     Long Term Goals: 24 weeks  1.Report decreased knee pain < / = 0/10  to increase tolerance for ADLs  2.Patient goal: Return to work in August  3.Increase strength to >/= 4+/5 in quadriceps  to increase tolerance for ADL and work activities.  4. Pt will report at CJ level (20-40% impaired) on FOTO knee to demonstrate increase in LE function with every day tasks.      Plan     Outpatient Physical Therapy 2 times weekly for 10 weeks to include the following interventions: manual therapy, therapeutic exercise, therapeutic activities, and neuromuscular re-education.    Monik Hope, PTA

## 2023-09-19 ENCOUNTER — CLINICAL SUPPORT (OUTPATIENT)
Dept: REHABILITATION | Facility: HOSPITAL | Age: 43
End: 2023-09-19
Payer: COMMERCIAL

## 2023-09-19 DIAGNOSIS — M25.561 ACUTE PAIN OF RIGHT KNEE: Primary | ICD-10-CM

## 2023-09-19 PROCEDURE — 97140 MANUAL THERAPY 1/> REGIONS: CPT | Performed by: PHYSICAL THERAPIST

## 2023-09-19 PROCEDURE — 97110 THERAPEUTIC EXERCISES: CPT | Performed by: PHYSICAL THERAPIST

## 2023-09-19 PROCEDURE — 97530 THERAPEUTIC ACTIVITIES: CPT | Performed by: PHYSICAL THERAPIST

## 2023-09-19 PROCEDURE — 97112 NEUROMUSCULAR REEDUCATION: CPT | Performed by: PHYSICAL THERAPIST

## 2023-09-19 NOTE — PROGRESS NOTES
[facility]   Physical Therapy Treatment Note      Name: Tato Godinez  Clinic Number: [id number]    Therapy Diagnosis:   Encounter Diagnosis   Name Primary?    Acute pain of right knee Yes     Physician: Lamine Early MD    Visit Date: 9/19/2023    Physician Orders: PT Eval and Treat   Medical Diagnosis from Referral: 3.411A (ICD-10-CM) - Tear of medial collateral ligament of right knee, initial encounter  Evaluation Date: 6/30/2023  Authorization Period Expiration: 7/30/2023  Plan of Care Expiration: 9/30/2023  Progress Note Due: 8/10/2023  Visit # / Visits authorized: 21/40     Time In: 1305  Time Out: 1410  Total Billable Time: 41 minutes    FOTO IE 6/30: 29  FOTO 2: 8/22: 66  FOTO 3:    Precautions: Standard, post-op MCL repair protocol    Procedure by Sharath: 6/28/23   1. Right knee MCL repair with internal brace  2. Diagnostic right knee arthroscopy     The postop plan for the patient is to follow our MCL repair protocol.  Hinged knee brace locked in extension weightbear as tolerated for now.    Subjective     Pt reports: Increased stiffness today. Not sure why.     She was compliant with home exercise program.  Response to previous treatment: mild-moderate quad soreness  Functional change: no significant change at this time    Pain: not verbalized/10  Location: right knee      Objective      DOS: 6/28/23  POD: 12 weeks     Knee Passive Range of Motion:    Right  Left    Flexion 125 125   Extension +2 +3      +Raul for 2 joint musculature and lateral hip stiffness    Don testing 70 degrees: L 46.4 kgs, 33.5 kgs    Treatment     Tato received the treatments listed below:      therapeutic exercises to develop ROM, strength, flexibility, endurance for 15 minutes including:  Stationary cycle 10 minutes for ROM and muscular endurance  LAQ matrix 19# 3 x 15  Waddles GTB x 2 turf laps    NP:  LLLD heel prop 8# x 5'  Heel slides x 4'   DL leg press 150# 2 x 10  SL leg  "press 100# 2 x 10     manual therapy techniques: joint mobilizations and/or soft tissue mobilizations were applied to the: knee for 08 minutes, including:     Assessment of joint mobility and ROM  Patellar mobilizations all planes IV  Infrapatellar fat pad mobs   Gr III-IV posterior tibiofemoral glide with tibial internal rotation      neuromuscular re-education activities to improve: motor control, balance, muscle activation, proprioception, posture for 8 minutes. The following activities were included:    Long sitting SLR 2# 4 x 10 - 12  9in HS bridges 3 x 8 x 5"     therapeutic activities to improve functional performance for 10 minutes, including:    SL LP 80 lbs 3 x 15  20in RLE bias SL STS 3 x 6 naseem   6in forward step up into TKE GTB 3 x 10 (dowel assist) - np    gait training to improve functional mobility and safety for 00 minutes, including:      Patient Education and Home Exercises       Education provided:   - HEP     Written Home Exercises Provided: Continue prior HEP. Exercises were reviewed and Tato was able to demonstrate them prior to the end of the session.  Tato demonstrated good  understanding of the education provided. See EMR under Patient Instructions for exercises provided during therapy sessions.    Assessment   Stiffness responded well to stationary cycle. Did well. Focus should be on quadriceps flexiblity, quadriceps strength, and posterior chain strengthening.    Tato is progressing well towards her goals.   Pt prognosis is Excellent.     Pt will continue to benefit from skilled outpatient physical therapy to address the deficits listed in the problem list box on initial evaluation, provide pt/family education and to maximize pt's level of independence in the home and community environment.     Pt's spiritual, cultural and educational needs considered and pt agreeable to plan of care and goals.     Anticipated barriers to physical therapy: None    GOALS: Short Term Goals:  6 " weeks  1.Report decreased knee pain  < / =  3/10  to increase tolerance for exercise  2. Increase knee ROM to 125 degrees flexion in order to be able to perform ADLs without difficulty.  3. Increase strength by 1/3 MMT grade in quadriceps  to increase tolerance for ADL and work activities.  4. Pt to tolerate HEP to improve ROM and independence with ADL's     Long Term Goals: 24 weeks  1.Report decreased knee pain < / = 0/10  to increase tolerance for ADLs  2.Patient goal: Return to work in August  3.Increase strength to >/= 4+/5 in quadriceps  to increase tolerance for ADL and work activities.  4. Pt will report at CJ level (20-40% impaired) on FOTO knee to demonstrate increase in LE function with every day tasks.      Plan     Outpatient Physical Therapy 2 times weekly for 10 weeks to include the following interventions: manual therapy, therapeutic exercise, therapeutic activities, and neuromuscular re-education.    Gal Bran, PT

## 2023-09-21 ENCOUNTER — OFFICE VISIT (OUTPATIENT)
Dept: SPORTS MEDICINE | Facility: CLINIC | Age: 43
End: 2023-09-21
Payer: COMMERCIAL

## 2023-09-21 VITALS
WEIGHT: 185.19 LBS | SYSTOLIC BLOOD PRESSURE: 117 MMHG | BODY MASS INDEX: 34.08 KG/M2 | HEART RATE: 71 BPM | DIASTOLIC BLOOD PRESSURE: 77 MMHG | HEIGHT: 62 IN

## 2023-09-21 DIAGNOSIS — Z98.890 S/P MCL REPAIR: Primary | ICD-10-CM

## 2023-09-21 PROCEDURE — 3078F PR MOST RECENT DIASTOLIC BLOOD PRESSURE < 80 MM HG: ICD-10-PCS | Mod: CPTII,S$GLB,, | Performed by: ORTHOPAEDIC SURGERY

## 2023-09-21 PROCEDURE — 1159F PR MEDICATION LIST DOCUMENTED IN MEDICAL RECORD: ICD-10-PCS | Mod: CPTII,S$GLB,, | Performed by: ORTHOPAEDIC SURGERY

## 2023-09-21 PROCEDURE — 99999 PR PBB SHADOW E&M-EST. PATIENT-LVL III: ICD-10-PCS | Mod: PBBFAC,,, | Performed by: ORTHOPAEDIC SURGERY

## 2023-09-21 PROCEDURE — 99999 PR PBB SHADOW E&M-EST. PATIENT-LVL III: CPT | Mod: PBBFAC,,, | Performed by: ORTHOPAEDIC SURGERY

## 2023-09-21 PROCEDURE — 3008F PR BODY MASS INDEX (BMI) DOCUMENTED: ICD-10-PCS | Mod: CPTII,S$GLB,, | Performed by: ORTHOPAEDIC SURGERY

## 2023-09-21 PROCEDURE — 3008F BODY MASS INDEX DOCD: CPT | Mod: CPTII,S$GLB,, | Performed by: ORTHOPAEDIC SURGERY

## 2023-09-21 PROCEDURE — 99024 POSTOP FOLLOW-UP VISIT: CPT | Mod: S$GLB,,, | Performed by: ORTHOPAEDIC SURGERY

## 2023-09-21 PROCEDURE — 3074F PR MOST RECENT SYSTOLIC BLOOD PRESSURE < 130 MM HG: ICD-10-PCS | Mod: CPTII,S$GLB,, | Performed by: ORTHOPAEDIC SURGERY

## 2023-09-21 PROCEDURE — 3074F SYST BP LT 130 MM HG: CPT | Mod: CPTII,S$GLB,, | Performed by: ORTHOPAEDIC SURGERY

## 2023-09-21 PROCEDURE — 3078F DIAST BP <80 MM HG: CPT | Mod: CPTII,S$GLB,, | Performed by: ORTHOPAEDIC SURGERY

## 2023-09-21 PROCEDURE — 1159F MED LIST DOCD IN RCRD: CPT | Mod: CPTII,S$GLB,, | Performed by: ORTHOPAEDIC SURGERY

## 2023-09-21 PROCEDURE — 99024 PR POST-OP FOLLOW-UP VISIT: ICD-10-PCS | Mod: S$GLB,,, | Performed by: ORTHOPAEDIC SURGERY

## 2023-09-21 NOTE — PROGRESS NOTES
"CC: Right knee post op 12 weeks    Patient is here for her 12 week post op appointment s/p below and is doing well. Patient is doing PT at [facility] and is progressing as expected.     DATE OF PROCEDURE: 6/28/2023     PREOPERATIVE DIAGNOSES:   1. Right knee high grade medial collateral ligament (MCL) tear.      POSTOPERATIVE DIAGNOSES:   1. Right knee high grade medial collateral ligament (MCL) tear.      PROCEDURES:   1. Right knee MCL repair with internal brace  2. Diagnostic right knee arthroscopy        SURGEON: Lamine Early M.D.        PE:    /77   Pulse 71   Ht 5' 2" (1.575 m)   Wt 84 kg (185 lb 3 oz)   BMI 33.87 kg/m²      Right knee:    Incisions clean/dry/intact  No sign of infection  Mild swelling  Compartments soft  Neurovascular status intact in extremity    PROM 0 to 120 degrees  Decreased quad strength  Minimal to no effusion    Assessment:  12 weeks s/p right knee MCL repair with internal brace, diagnostic arthroscopy    Plan:  1.  Continue PT    2. RTC  in 2 months.      All questions were answered. Instructed patient to call with questions or concerns in the interim.         "

## 2023-09-26 ENCOUNTER — CLINICAL SUPPORT (OUTPATIENT)
Dept: REHABILITATION | Facility: HOSPITAL | Age: 43
End: 2023-09-26
Payer: COMMERCIAL

## 2023-09-26 DIAGNOSIS — M25.561 ACUTE PAIN OF RIGHT KNEE: Primary | ICD-10-CM

## 2023-09-26 PROCEDURE — 97110 THERAPEUTIC EXERCISES: CPT | Performed by: PHYSICAL THERAPIST

## 2023-09-26 PROCEDURE — 97140 MANUAL THERAPY 1/> REGIONS: CPT | Performed by: PHYSICAL THERAPIST

## 2023-09-26 PROCEDURE — 97530 THERAPEUTIC ACTIVITIES: CPT | Performed by: PHYSICAL THERAPIST

## 2023-09-26 PROCEDURE — 97112 NEUROMUSCULAR REEDUCATION: CPT | Performed by: PHYSICAL THERAPIST

## 2023-10-03 ENCOUNTER — CLINICAL SUPPORT (OUTPATIENT)
Dept: REHABILITATION | Facility: HOSPITAL | Age: 43
End: 2023-10-03
Payer: COMMERCIAL

## 2023-10-03 DIAGNOSIS — M25.561 ACUTE PAIN OF RIGHT KNEE: Primary | ICD-10-CM

## 2023-10-03 PROCEDURE — 97530 THERAPEUTIC ACTIVITIES: CPT | Performed by: PHYSICAL THERAPIST

## 2023-10-03 PROCEDURE — 97112 NEUROMUSCULAR REEDUCATION: CPT | Performed by: PHYSICAL THERAPIST

## 2023-10-03 PROCEDURE — 97110 THERAPEUTIC EXERCISES: CPT | Performed by: PHYSICAL THERAPIST

## 2023-10-03 NOTE — PROGRESS NOTES
[facility]   Physical Therapy Treatment Note      Name: Tato Godinez  Clinic Number: [id number]    Therapy Diagnosis:   Encounter Diagnosis   Name Primary?    Acute pain of right knee Yes     Physician: Lamine Early MD    Visit Date: 10/3/2023    Physician Orders: PT Eval and Treat   Medical Diagnosis from Referral: 3.411A (ICD-10-CM) - Tear of medial collateral ligament of right knee, initial encounter  Evaluation Date: 6/30/2023  Authorization Period Expiration: 7/30/2023  Plan of Care Expiration: 9/30/2023  Progress Note Due: 8/10/2023  Visit # / Visits authorized: 23/40     Time In: 1355  Time Out: 1510  Total Billable Time: 45 minutes    FOTO IE 6/30: 29  FOTO 2: 8/22: 66  FOTO 3:    Precautions: Standard, post-op MCL repair protocol    Procedure by Sharath: 6/28/23   1. Right knee MCL repair with internal brace  2. Diagnostic right knee arthroscopy     The postop plan for the patient is to follow our MCL repair protocol.  Hinged knee brace locked in extension weightbear as tolerated for now.    Subjective     Pt reports: Stiffness continues to improve.    She was compliant with home exercise program.  Response to previous treatment: mild-moderate quad soreness  Functional change: no significant change at this time    Pain: not verbalized/10  Location: right knee      Objective      DOS: 6/28/23  POD: 12 weeks     Knee Passive Range of Motion:    Right  Left    Flexion 125 125   Extension +2 +3      +Raul for 2 joint musculature and lateral hip stiffness    Don testing 70 degrees: L 46.4 kgs, 33.5 kgs    Treatment     Tato received the treatments listed below:      therapeutic exercises to develop ROM, strength, flexibility, endurance for 10 minutes including:  LAQ matrix 35# 3 x 10  HS curl 25 lbs 3 x 10  Waddles GTB x 2 turf laps    NP:  LLLD heel prop 8# x 5'  Heel slides x 4'   DL leg press 150# 2 x 10  SL leg press 100# 2 x 10     manual therapy techniques:  "joint mobilizations and/or soft tissue mobilizations were applied to the: knee for 00 minutes, including:     Assessment of joint mobility and ROM  Patellar mobilizations all planes IV  Infrapatellar fat pad mobs   Gr III-IV posterior tibiofemoral glide with tibial internal rotation      neuromuscular re-education activities to improve: motor control, balance, muscle activation, proprioception, posture for 25 minutes. The following activities were included:    Long sitting SLR 3# 4 x 10 - 12  9in HS bridges 3 x 8 x 5"   Sball HS curl 3 x burn    therapeutic activities to improve functional performance for 10 minutes, including:    SL  lbs 4 x 10    gait training to improve functional mobility and safety for 00 minutes, including:      Patient Education and Home Exercises       Education provided:   - HEP     Written Home Exercises Provided: Continue prior HEP. Exercises were reviewed and Tato was able to demonstrate them prior to the end of the session.  Tato demonstrated good  understanding of the education provided. See EMR under Patient Instructions for exercises provided during therapy sessions.    Assessment   Progressing with both strength and functional mobility. Continue to focus on quad and posterior chain strength. Strength test next week.    Tato is progressing well towards her goals.   Pt prognosis is Excellent.     Pt will continue to benefit from skilled outpatient physical therapy to address the deficits listed in the problem list box on initial evaluation, provide pt/family education and to maximize pt's level of independence in the home and community environment.     Pt's spiritual, cultural and educational needs considered and pt agreeable to plan of care and goals.     Anticipated barriers to physical therapy: None    GOALS: Short Term Goals:  6 weeks  1.Report decreased knee pain  < / =  3/10  to increase tolerance for exercise  2. Increase knee ROM to 125 degrees flexion in order " to be able to perform ADLs without difficulty.  3. Increase strength by 1/3 MMT grade in quadriceps  to increase tolerance for ADL and work activities.  4. Pt to tolerate HEP to improve ROM and independence with ADL's     Long Term Goals: 24 weeks  1.Report decreased knee pain < / = 0/10  to increase tolerance for ADLs  2.Patient goal: Return to work in August  3.Increase strength to >/= 4+/5 in quadriceps  to increase tolerance for ADL and work activities.  4. Pt will report at CJ level (20-40% impaired) on FOTO knee to demonstrate increase in LE function with every day tasks.      Plan     Outpatient Physical Therapy 2 times weekly for 10 weeks to include the following interventions: manual therapy, therapeutic exercise, therapeutic activities, and neuromuscular re-education.    Gal Bran, PT, DPT

## 2023-10-05 ENCOUNTER — CLINICAL SUPPORT (OUTPATIENT)
Dept: REHABILITATION | Facility: HOSPITAL | Age: 43
End: 2023-10-05
Payer: COMMERCIAL

## 2023-10-05 DIAGNOSIS — M25.561 ACUTE PAIN OF RIGHT KNEE: Primary | ICD-10-CM

## 2023-10-05 PROCEDURE — 97112 NEUROMUSCULAR REEDUCATION: CPT | Performed by: PHYSICAL THERAPIST

## 2023-10-05 PROCEDURE — 97110 THERAPEUTIC EXERCISES: CPT | Performed by: PHYSICAL THERAPIST

## 2023-10-05 PROCEDURE — 97530 THERAPEUTIC ACTIVITIES: CPT | Performed by: PHYSICAL THERAPIST

## 2023-10-05 NOTE — PROGRESS NOTES
[facility]   Physical Therapy Treatment Note      Name: Tato Godinez  Clinic Number: [id number]    Therapy Diagnosis:   Encounter Diagnosis   Name Primary?    Acute pain of right knee Yes     Physician: Lamine Early MD    Visit Date: 9/26/2023    Physician Orders: PT Eval and Treat   Medical Diagnosis from Referral: 3.411A (ICD-10-CM) - Tear of medial collateral ligament of right knee, initial encounter  Evaluation Date: 6/30/2023  Authorization Period Expiration: 7/30/2023  Plan of Care Expiration: 9/30/2023  Progress Note Due: 8/10/2023  Visit # / Visits authorized: 23/40     Time In: 1305  Time Out: 1410  Total Billable Time: 41 minutes    FOTO IE 6/30: 29  FOTO 2: 8/22: 66  FOTO 3:    Precautions: Standard, post-op MCL repair protocol    Procedure by Sharath: 6/28/23   1. Right knee MCL repair with internal brace  2. Diagnostic right knee arthroscopy     The postop plan for the patient is to follow our MCL repair protocol.  Hinged knee brace locked in extension weightbear as tolerated for now.    Subjective     Pt reports: No new reports of pain.    She was compliant with home exercise program.  Response to previous treatment: mild-moderate quad soreness  Functional change: no significant change at this time    Pain: not verbalized/10  Location: right knee      Objective      DOS: 6/28/23  POD: 12 weeks     Knee Passive Range of Motion:    Right  Left    Flexion 125 125   Extension +2 +3      +Raul for 2 joint musculature and lateral hip stiffness    Don testing 70 degrees: L 46.4 kgs, 33.5 kgs    Treatment     Tato received the treatments listed below:      therapeutic exercises to develop ROM, strength, flexibility, endurance for 15 minutes including:  Stationary cycle 10 minutes for ROM and muscular endurance  LAQ matrix 19# 3 x 15  Waddles GTB x 2 turf laps    NP:  LLLD heel prop 8# x 5'  Heel slides x 4'   DL leg press 150# 2 x 10  SL leg press 100# 2 x 10    "  manual therapy techniques: joint mobilizations and/or soft tissue mobilizations were applied to the: knee for 08 minutes, including:     Assessment of joint mobility and ROM  Patellar mobilizations all planes IV  Infrapatellar fat pad mobs   Gr III-IV posterior tibiofemoral glide with tibial internal rotation      neuromuscular re-education activities to improve: motor control, balance, muscle activation, proprioception, posture for 8 minutes. The following activities were included:    Long sitting SLR 2# 4 x 10 - 12  9in HS bridges 3 x 8 x 5"     therapeutic activities to improve functional performance for 10 minutes, including:    SL LP 80 lbs 3 x 15  20in RLE bias SL STS 3 x 6 naseem   6in forward step up into TKE GTB 3 x 10 (dowel assist) - np    gait training to improve functional mobility and safety for 00 minutes, including:      Patient Education and Home Exercises       Education provided:   - HEP     Written Home Exercises Provided: Continue prior HEP. Exercises were reviewed and Tato was able to demonstrate them prior to the end of the session.  Tato demonstrated good  understanding of the education provided. See EMR under Patient Instructions for exercises provided during therapy sessions.    Assessment   Continues to progress with strength.Open chain knee strength doing well.Needs quadriceps and posterior chain strengthening.    Tato is progressing well towards her goals.   Pt prognosis is Excellent.     Pt will continue to benefit from skilled outpatient physical therapy to address the deficits listed in the problem list box on initial evaluation, provide pt/family education and to maximize pt's level of independence in the home and community environment.     Pt's spiritual, cultural and educational needs considered and pt agreeable to plan of care and goals.     Anticipated barriers to physical therapy: None    GOALS: Short Term Goals:  6 weeks  1.Report decreased knee pain  < / =  3/10  to " increase tolerance for exercise  2. Increase knee ROM to 125 degrees flexion in order to be able to perform ADLs without difficulty.  3. Increase strength by 1/3 MMT grade in quadriceps  to increase tolerance for ADL and work activities.  4. Pt to tolerate HEP to improve ROM and independence with ADL's     Long Term Goals: 24 weeks  1.Report decreased knee pain < / = 0/10  to increase tolerance for ADLs  2.Patient goal: Return to work in August  3.Increase strength to >/= 4+/5 in quadriceps  to increase tolerance for ADL and work activities.  4. Pt will report at CJ level (20-40% impaired) on FOTO knee to demonstrate increase in LE function with every day tasks.      Plan     Outpatient Physical Therapy 2 times weekly for 10 weeks to include the following interventions: manual therapy, therapeutic exercise, therapeutic activities, and neuromuscular re-education.    Gal Bran, PT

## 2023-10-05 NOTE — PROGRESS NOTES
[facility]   Physical Therapy Treatment Note      Name: Tato Godinez  Clinic Number: [id number]    Therapy Diagnosis:   Encounter Diagnosis   Name Primary?    Acute pain of right knee Yes     Physician: Lamine Early MD    Visit Date: 10/5/2023    Physician Orders: PT Eval and Treat   Medical Diagnosis from Referral: 3.411A (ICD-10-CM) - Tear of medial collateral ligament of right knee, initial encounter  Evaluation Date: 6/30/2023  Authorization Period Expiration: 7/30/2023  Plan of Care Expiration: 9/30/2023  Progress Note Due: 8/10/2023  Visit # / Visits authorized: 24/40     Time In: 1250  Time Out: 1353  Total Billable Time: 45 minutes    FOTO IE 6/30: 29  FOTO 2: 8/22: 66  FOTO 3:    Precautions: Standard, post-op MCL repair protocol    Procedure by Sharath: 6/28/23   1. Right knee MCL repair with internal brace  2. Diagnostic right knee arthroscopy     The postop plan for the patient is to follow our MCL repair protocol.  Hinged knee brace locked in extension weightbear as tolerated for now.    Subjective     Pt reports: No knee pain    She was compliant with home exercise program.  Response to previous treatment: mild-moderate quad soreness  Functional change: no significant change at this time    Pain: not verbalized/10  Location: right knee      Objective      DOS: 6/28/23  POD: 12 weeks     Knee Passive Range of Motion:    Right  Left    Flexion 125 125   Extension +2 +3      +Raul for 2 joint musculature and lateral hip stiffness    Don testing 70 degrees: L 46.4 kgs, 33.5 kgs    Treatment     Tato received the treatments listed below:      therapeutic exercises to develop ROM, strength, flexibility, endurance for 10 minutes including:  LAQ matrix 35# 3 x 10  HS curl 25 lbs 3 x 10      NP:  LLLD heel prop 8# x 5'  Heel slides x 4'   DL leg press 150# 2 x 10  SL leg press 100# 2 x 10     manual therapy techniques: joint mobilizations and/or soft tissue  mobilizations were applied to the: knee for 00 minutes, including:     Assessment of joint mobility and ROM  Patellar mobilizations all planes IV  Infrapatellar fat pad mobs   Gr III-IV posterior tibiofemoral glide with tibial internal rotation      neuromuscular re-education activities to improve: motor control, balance, muscle activation, proprioception, posture for 10 minutes. The following activities were included:    Long sitting SLR 3# 4 x 10 - 12  Sball HS curl 3 x burn    therapeutic activities to improve functional performance for 25 minutes, including:    SL  lbs 4 x 10  Squat to ER with band, 2 x 20  2 up 1 down heel raise for eccentric loading     gait training to improve functional mobility and safety for 00 minutes, including:      Patient Education and Home Exercises       Education provided:   - HEP     Written Home Exercises Provided: Continue prior HEP. Exercises were reviewed and Tato was able to demonstrate them prior to the end of the session.  Tato demonstrated good  understanding of the education provided. See EMR under Patient Instructions for exercises provided during therapy sessions.    Assessment   Continues to progress well. Minimal issues. Needs to be strength tested next session.     Tato is progressing well towards her goals.   Pt prognosis is Excellent.     Pt will continue to benefit from skilled outpatient physical therapy to address the deficits listed in the problem list box on initial evaluation, provide pt/family education and to maximize pt's level of independence in the home and community environment.     Pt's spiritual, cultural and educational needs considered and pt agreeable to plan of care and goals.     Anticipated barriers to physical therapy: None    GOALS: Short Term Goals:  6 weeks  1.Report decreased knee pain  < / =  3/10  to increase tolerance for exercise  2. Increase knee ROM to 125 degrees flexion in order to be able to perform ADLs without  difficulty.  3. Increase strength by 1/3 MMT grade in quadriceps  to increase tolerance for ADL and work activities.  4. Pt to tolerate HEP to improve ROM and independence with ADL's     Long Term Goals: 24 weeks  1.Report decreased knee pain < / = 0/10  to increase tolerance for ADLs  2.Patient goal: Return to work in August  3.Increase strength to >/= 4+/5 in quadriceps  to increase tolerance for ADL and work activities.  4. Pt will report at CJ level (20-40% impaired) on FOTO knee to demonstrate increase in LE function with every day tasks.      Plan     Outpatient Physical Therapy 2 times weekly for 10 weeks to include the following interventions: manual therapy, therapeutic exercise, therapeutic activities, and neuromuscular re-education.    Gal Bran, PT, DPT

## 2023-10-10 ENCOUNTER — CLINICAL SUPPORT (OUTPATIENT)
Dept: REHABILITATION | Facility: HOSPITAL | Age: 43
End: 2023-10-10
Payer: COMMERCIAL

## 2023-10-10 DIAGNOSIS — M25.561 ACUTE PAIN OF RIGHT KNEE: Primary | ICD-10-CM

## 2023-10-10 PROCEDURE — 97530 THERAPEUTIC ACTIVITIES: CPT | Mod: CQ

## 2023-10-10 PROCEDURE — 97112 NEUROMUSCULAR REEDUCATION: CPT | Mod: CQ

## 2023-10-10 PROCEDURE — 97110 THERAPEUTIC EXERCISES: CPT | Mod: CQ

## 2023-10-10 NOTE — PROGRESS NOTES
[facility]   Physical Therapy Treatment Note      Name: Tato Godinez  Clinic Number: [id number]    Therapy Diagnosis:   Encounter Diagnosis   Name Primary?    Acute pain of right knee Yes     Physician: Lamine Early MD    Visit Date: 10/10/2023    Physician Orders: PT Eval and Treat   Medical Diagnosis from Referral: 3.411A (ICD-10-CM) - Tear of medial collateral ligament of right knee, initial encounter  Evaluation Date: 6/30/2023  Authorization Period Expiration: 7/30/2023  Plan of Care Expiration: 9/30/2023  Progress Note Due: 8/10/2023  Visit # / Visits authorized: 24/40     Time In: 10:15  Time Out: 11:00  Total Billable Time: 45 minutes    FOTO IE 6/30: 29  FOTO 2: 8/22: 66  FOTO 3:    Precautions: Standard, post-op MCL repair protocol    Procedure by Sharath: 6/28/23   1. Right knee MCL repair with internal brace  2. Diagnostic right knee arthroscopy     The postop plan for the patient is to follow our MCL repair protocol.  Hinged knee brace locked in extension weightbear as tolerated for now.    Subjective     Pt reports: No knee pain    She was compliant with home exercise program.  Response to previous treatment: mild-moderate quad soreness  Functional change: no significant change at this time    Pain: not verbalized/10  Location: right knee      Objective      DOS: 6/28/23  POD: 12 weeks     Knee Passive Range of Motion:    Right  Left    Flexion 125 125   Extension +2 +3      +Raul for 2 joint musculature and lateral hip stiffness    Don testing 70 degrees: L 46.4 kgs, 33.5 kgs    Treatment     Tato received the treatments listed below:      therapeutic exercises to develop ROM, strength, flexibility, endurance for 10 minutes including:  LAQ matrix 35# 3 x 10  HS curl 25 lbs 3 x 10      NP:  LLLD heel prop 8# x 5'  Heel slides x 4'   DL leg press 150# 2 x 10  SL leg press 100# 2 x 10     manual therapy techniques: joint mobilizations and/or soft tissue  mobilizations were applied to the: knee for 00 minutes, including:     Assessment of joint mobility and ROM  Patellar mobilizations all planes IV  Infrapatellar fat pad mobs   Gr III-IV posterior tibiofemoral glide with tibial internal rotation      neuromuscular re-education activities to improve: motor control, balance, muscle activation, proprioception, posture for 10 minutes. The following activities were included:    Long sitting SLR 3# 4 x 10 - 12  Sball HS curl 3 x burn    therapeutic activities to improve functional performance for 25 minutes, including:    SL  lbs 4 x 10  Squat to ER with green band, 2 x 20  2 up 1 down heel raise for eccentric loading     gait training to improve functional mobility and safety for 00 minutes, including:      Patient Education and Home Exercises       Education provided:   - HEP     Written Home Exercises Provided: Continue prior HEP. Exercises were reviewed and Tato was able to demonstrate them prior to the end of the session.  Tato demonstrated good  understanding of the education provided. See EMR under Patient Instructions for exercises provided during therapy sessions.    Assessment   This session cont with working on quad strength. Cueing for hip hinge with squatting mechanics. Was able to to correct.  Needs to be strength tested next session.     Tato is progressing well towards her goals.   Pt prognosis is Excellent.     Pt will continue to benefit from skilled outpatient physical therapy to address the deficits listed in the problem list box on initial evaluation, provide pt/family education and to maximize pt's level of independence in the home and community environment.     Pt's spiritual, cultural and educational needs considered and pt agreeable to plan of care and goals.     Anticipated barriers to physical therapy: None    GOALS: Short Term Goals:  6 weeks  1.Report decreased knee pain  < / =  3/10  to increase tolerance for exercise  2.  Increase knee ROM to 125 degrees flexion in order to be able to perform ADLs without difficulty.  3. Increase strength by 1/3 MMT grade in quadriceps  to increase tolerance for ADL and work activities.  4. Pt to tolerate HEP to improve ROM and independence with ADL's     Long Term Goals: 24 weeks  1.Report decreased knee pain < / = 0/10  to increase tolerance for ADLs  2.Patient goal: Return to work in August  3.Increase strength to >/= 4+/5 in quadriceps  to increase tolerance for ADL and work activities.  4. Pt will report at CJ level (20-40% impaired) on FOTO knee to demonstrate increase in LE function with every day tasks.      Plan     Outpatient Physical Therapy 2 times weekly for 10 weeks to include the following interventions: manual therapy, therapeutic exercise, therapeutic activities, and neuromuscular re-education.    Emeli Rubio PTA,

## 2023-10-13 ENCOUNTER — CLINICAL SUPPORT (OUTPATIENT)
Dept: REHABILITATION | Facility: HOSPITAL | Age: 43
End: 2023-10-13
Attending: ORTHOPAEDIC SURGERY
Payer: COMMERCIAL

## 2023-10-13 DIAGNOSIS — M25.561 ACUTE PAIN OF RIGHT KNEE: Primary | ICD-10-CM

## 2023-10-13 PROCEDURE — 97110 THERAPEUTIC EXERCISES: CPT | Mod: CQ

## 2023-10-13 PROCEDURE — 97112 NEUROMUSCULAR REEDUCATION: CPT | Mod: CQ

## 2023-10-13 PROCEDURE — 97530 THERAPEUTIC ACTIVITIES: CPT | Mod: CQ

## 2023-10-13 NOTE — PROGRESS NOTES
"[facility]   Physical Therapy Treatment Note      Name: Tato Godinez  Clinic Number: [id number]    Therapy Diagnosis:   Encounter Diagnosis   Name Primary?    Acute pain of right knee Yes     Physician: Lamine Early MD    Visit Date: 10/13/2023    Physician Orders: PT Eval and Treat   Medical Diagnosis from Referral: 3.411A (ICD-10-CM) - Tear of medial collateral ligament of right knee, initial encounter  Evaluation Date: 6/30/2023  Authorization Period Expiration: 7/30/2023  Plan of Care Expiration: 9/30/2023  Progress Note Due: 8/10/2023  Visit # / Visits authorized: 26/40     Time In: 1300  Time Out: 1358  Total Billable Time: 39 minutes    FOTO IE 6/30: 29  FOTO 2: 8/22: 66  FOTO 3:    Precautions: Standard, post-op MCL repair protocol    Procedure by Sharath: 6/28/23   1. Right knee MCL repair with internal brace  2. Diagnostic right knee arthroscopy     The postop plan for the patient is to follow our MCL repair protocol.  Hinged knee brace locked in extension weightbear as tolerated for now.    Subjective     Pt reports: no new complaints.     She was compliant with home exercise program.  Response to previous treatment: mild-moderate quad soreness  Functional change: no significant change at this time    Pain: not verbalized/10  Location: right knee      Objective      DOS: 6/28/23  POD: 15 weeks, 2 days as of 10/13     Knee Passive Range of Motion:    Right  Left    Flexion 125 125   Extension +2 +3      +Raul for 2 joint musculature and lateral hip stiffness    Don testing 70 degrees: L 46.4 kgs, 33.5 kgs    Treatment     Tato received the treatments listed below:      therapeutic exercises to develop ROM, strength, flexibility, endurance for 12 minutes including:    Ext hinge strap stretch 5 x 20"  Waddles GTB x 1 turf lap    Next tx - Crane scale testing    NP:  LLLD heel prop 8# x 5'  DL leg press 150# 2 x 10  SL leg press 100# 2 x 10  LAQ matrix 35# 3 x " "10  HS curl 25 lbs 3 x 10     manual therapy techniques: joint mobilizations and/or soft tissue mobilizations were applied to the: knee for 03 minutes, including:     Assessment of joint mobility and ROM  Patellar mobilizations all planes IV  Infrapatellar fat pad mobs   Gr III-IV posterior tibiofemoral glide with tibial internal rotation      neuromuscular re-education activities to improve: motor control, balance, muscle activation, proprioception, posture for 14 minutes. The following activities were included:    Long sitting SLR 3# 3 x 10 x 3"  LAQ hammer 25# 2 x 15  Sball HS curl 3 x burn - np    therapeutic activities to improve functional performance for 25 minutes, including:    Upright bike lvl 5 x 10' for LE strengthening/cardiovascular endurance  Ecc SL leg press 120# 3 x 12  DL squats on ramp 26# 3 x 12        Patient Education and Home Exercises       Education provided:   - HEP     Written Home Exercises Provided: Continue prior HEP. Exercises were reviewed and Tato was able to demonstrate them prior to the end of the session.  Tato demonstrated good  understanding of the education provided. See EMR under Patient Instructions for exercises provided during therapy sessions.    Assessment     Tato did well today. Good tolerance of heavier ecc SL leg press and DL squats. Fatigue reported during LAQ hammer and resisted lateral walks. Will Don scale test next tx per supervising DPT. No adverse effects reported p tx.     Tato is progressing well towards her goals.   Pt prognosis is Excellent.     Pt will continue to benefit from skilled outpatient physical therapy to address the deficits listed in the problem list box on initial evaluation, provide pt/family education and to maximize pt's level of independence in the home and community environment.     Pt's spiritual, cultural and educational needs considered and pt agreeable to plan of care and goals.     Anticipated barriers to physical " therapy: None    GOALS: Short Term Goals:  6 weeks  1.Report decreased knee pain  < / =  3/10  to increase tolerance for exercise  2. Increase knee ROM to 125 degrees flexion in order to be able to perform ADLs without difficulty.  3. Increase strength by 1/3 MMT grade in quadriceps  to increase tolerance for ADL and work activities.  4. Pt to tolerate HEP to improve ROM and independence with ADL's     Long Term Goals: 24 weeks  1.Report decreased knee pain < / = 0/10  to increase tolerance for ADLs  2.Patient goal: Return to work in August  3.Increase strength to >/= 4+/5 in quadriceps  to increase tolerance for ADL and work activities.  4. Pt will report at CJ level (20-40% impaired) on FOTO knee to demonstrate increase in LE function with every day tasks.      Plan     Outpatient Physical Therapy 2 times weekly for 10 weeks to include the following interventions: manual therapy, therapeutic exercise, therapeutic activities, and neuromuscular re-education.    Monik Hope, PTA

## 2023-10-17 ENCOUNTER — CLINICAL SUPPORT (OUTPATIENT)
Dept: REHABILITATION | Facility: HOSPITAL | Age: 43
End: 2023-10-17
Payer: COMMERCIAL

## 2023-10-17 DIAGNOSIS — M25.561 ACUTE PAIN OF RIGHT KNEE: Primary | ICD-10-CM

## 2023-10-17 PROCEDURE — 97112 NEUROMUSCULAR REEDUCATION: CPT | Mod: CQ

## 2023-10-17 PROCEDURE — 97140 MANUAL THERAPY 1/> REGIONS: CPT | Mod: CQ

## 2023-10-17 NOTE — PROGRESS NOTES
[facility]   Physical Therapy Treatment Note      Name: Tato Godinez  Clinic Number: [id number]    Therapy Diagnosis:   Encounter Diagnosis   Name Primary?    Acute pain of right knee Yes     Physician: Lamine Early MD    Visit Date: 10/17/2023    Physician Orders: PT Eval and Treat   Medical Diagnosis from Referral: 3.411A (ICD-10-CM) - Tear of medial collateral ligament of right knee, initial encounter  Evaluation Date: 6/30/2023  Authorization Period Expiration: 7/30/2023  Plan of Care Expiration: 9/30/2023  Progress Note Due: 8/10/2023  Visit # / Visits authorized: 27/40     Time In: 1400  Time Out: 1500  Total Billable Time: 46 minutes    FOTO IE 6/30: 29  FOTO 2: 8/22: 66  FOTO 3:    Precautions: Standard, post-op MCL repair protocol    Procedure by Sharath: 6/28/23   1. Right knee MCL repair with internal brace  2. Diagnostic right knee arthroscopy     The postop plan for the patient is to follow our MCL repair protocol.  Hinged knee brace locked in extension weightbear as tolerated for now.    Subjective     Pt reports: she slipped on water in her bathroom and fell all the way to floor. She reports the motion to be a valgus stress with potential hyperextension.     She was compliant with home exercise program.  Response to previous treatment: mild-moderate quad soreness  Functional change: no significant change at this time    Pain: not verbalized/10  Location: right knee      Objective      DOS: 6/28/23  POD: 15 weeks, 2 days as of 10/13     Knee Passive Range of Motion:    Right  Left    Flexion 125 125   Extension +2 +3      +Raul for 2 joint musculature and lateral hip stiffness    Don testing 70 degrees: L 46.4 kgs, 33.5 kgs    Treatment     Tato received the treatments listed below:      therapeutic exercises to develop ROM, strength, flexibility, endurance for 00 minutes including:    NP:  PHAMLD heel prop 8# x 5'  DL leg press 150# 2 x 10  SL leg press  "100# 2 x 10  LAQ matrix 35# 3 x 10  HS curl 25 lbs 3 x 10  Ext hinge strap stretch 5 x 20"  Waddles GTB x 1 turf lap     manual therapy techniques: joint mobilizations and/or soft tissue mobilizations were applied to the: knee for 16 minutes, including:     Assessment of knee by DPT Gabino Troncoso  Patellar mobilizations all planes IV  Infrapatellar fat pad mobs   Gr III-IV posterior tibiofemoral glide with tibial internal rotation      neuromuscular re-education activities to improve: motor control, balance, muscle activation, proprioception, posture for 30 minutes. The following activities were included:    Quad sets into hyperext 10" hold x 4'  Long sitting SLR 0# 3 x 10 x 3"  S/L hip ABD 3 x 15 x 3"  Bridges YTB 2 x 10 x 10"  S/L clams YTB 15 x 10" naseem     NP:  LAQ hammer 25# 2 x 15  Sball HS curl 3 x burn - np    therapeutic activities to improve functional performance for 00 minutes, including:    Upright bike lvl 5 x 10' for LE strengthening/cardiovascular endurance  Ecc SL leg press 120# 3 x 12  DL squats on ramp 26# 3 x 12        Patient Education and Home Exercises       Education provided:   - HEP     Written Home Exercises Provided: Continue prior HEP. Exercises were reviewed and Tato was able to demonstrate them prior to the end of the session.  Tato demonstrated good  understanding of the education provided. See EMR under Patient Instructions for exercises provided during therapy sessions.    Assessment     Today's tx was limited d/t fall this morning. DPT Gabino Troncoso assessment her knee with no sig valgus laxity noted at 0deg and 30deg. Mild TTP around mid substance of MCL. Extensor mechanism intact with a negative Lachman's. Updated MD about her fall, and he will move her f/u appt with him to next week. Will reassess next week and progress as appropriate.     Tato is progressing well towards her goals.   Pt prognosis is Excellent.     Pt will continue to benefit from skilled " outpatient physical therapy to address the deficits listed in the problem list box on initial evaluation, provide pt/family education and to maximize pt's level of independence in the home and community environment.     Pt's spiritual, cultural and educational needs considered and pt agreeable to plan of care and goals.     Anticipated barriers to physical therapy: None    GOALS: Short Term Goals:  6 weeks  1.Report decreased knee pain  < / =  3/10  to increase tolerance for exercise  2. Increase knee ROM to 125 degrees flexion in order to be able to perform ADLs without difficulty.  3. Increase strength by 1/3 MMT grade in quadriceps  to increase tolerance for ADL and work activities.  4. Pt to tolerate HEP to improve ROM and independence with ADL's     Long Term Goals: 24 weeks  1.Report decreased knee pain < / = 0/10  to increase tolerance for ADLs  2.Patient goal: Return to work in August  3.Increase strength to >/= 4+/5 in quadriceps  to increase tolerance for ADL and work activities.  4. Pt will report at CJ level (20-40% impaired) on FOTO knee to demonstrate increase in LE function with every day tasks.      Plan     Outpatient Physical Therapy 2 times weekly for 10 weeks to include the following interventions: manual therapy, therapeutic exercise, therapeutic activities, and neuromuscular re-education.    Monik Hope, PTA

## 2023-10-24 ENCOUNTER — OFFICE VISIT (OUTPATIENT)
Dept: SPORTS MEDICINE | Facility: CLINIC | Age: 43
End: 2023-10-24
Payer: COMMERCIAL

## 2023-10-24 ENCOUNTER — CLINICAL SUPPORT (OUTPATIENT)
Dept: REHABILITATION | Facility: HOSPITAL | Age: 43
End: 2023-10-24
Payer: COMMERCIAL

## 2023-10-24 VITALS
WEIGHT: 187.38 LBS | HEART RATE: 77 BPM | HEIGHT: 62 IN | DIASTOLIC BLOOD PRESSURE: 70 MMHG | BODY MASS INDEX: 34.48 KG/M2 | SYSTOLIC BLOOD PRESSURE: 106 MMHG

## 2023-10-24 DIAGNOSIS — M25.561 ACUTE PAIN OF RIGHT KNEE: Primary | ICD-10-CM

## 2023-10-24 DIAGNOSIS — Z98.890 S/P MCL REPAIR: Primary | ICD-10-CM

## 2023-10-24 PROCEDURE — 3008F PR BODY MASS INDEX (BMI) DOCUMENTED: ICD-10-PCS | Mod: CPTII,S$GLB,, | Performed by: ORTHOPAEDIC SURGERY

## 2023-10-24 PROCEDURE — 97110 THERAPEUTIC EXERCISES: CPT | Mod: CQ

## 2023-10-24 PROCEDURE — 1159F MED LIST DOCD IN RCRD: CPT | Mod: CPTII,S$GLB,, | Performed by: ORTHOPAEDIC SURGERY

## 2023-10-24 PROCEDURE — 3008F BODY MASS INDEX DOCD: CPT | Mod: CPTII,S$GLB,, | Performed by: ORTHOPAEDIC SURGERY

## 2023-10-24 PROCEDURE — 3078F PR MOST RECENT DIASTOLIC BLOOD PRESSURE < 80 MM HG: ICD-10-PCS | Mod: CPTII,S$GLB,, | Performed by: ORTHOPAEDIC SURGERY

## 2023-10-24 PROCEDURE — 3074F PR MOST RECENT SYSTOLIC BLOOD PRESSURE < 130 MM HG: ICD-10-PCS | Mod: CPTII,S$GLB,, | Performed by: ORTHOPAEDIC SURGERY

## 2023-10-24 PROCEDURE — 3074F SYST BP LT 130 MM HG: CPT | Mod: CPTII,S$GLB,, | Performed by: ORTHOPAEDIC SURGERY

## 2023-10-24 PROCEDURE — 97112 NEUROMUSCULAR REEDUCATION: CPT | Mod: CQ

## 2023-10-24 PROCEDURE — 99999 PR PBB SHADOW E&M-EST. PATIENT-LVL III: CPT | Mod: PBBFAC,,, | Performed by: ORTHOPAEDIC SURGERY

## 2023-10-24 PROCEDURE — 1159F PR MEDICATION LIST DOCUMENTED IN MEDICAL RECORD: ICD-10-PCS | Mod: CPTII,S$GLB,, | Performed by: ORTHOPAEDIC SURGERY

## 2023-10-24 PROCEDURE — 3078F DIAST BP <80 MM HG: CPT | Mod: CPTII,S$GLB,, | Performed by: ORTHOPAEDIC SURGERY

## 2023-10-24 PROCEDURE — 99999 PR PBB SHADOW E&M-EST. PATIENT-LVL III: ICD-10-PCS | Mod: PBBFAC,,, | Performed by: ORTHOPAEDIC SURGERY

## 2023-10-24 PROCEDURE — 99214 PR OFFICE/OUTPT VISIT, EST, LEVL IV, 30-39 MIN: ICD-10-PCS | Mod: S$GLB,,, | Performed by: ORTHOPAEDIC SURGERY

## 2023-10-24 PROCEDURE — 97530 THERAPEUTIC ACTIVITIES: CPT | Mod: CQ

## 2023-10-24 PROCEDURE — 99214 OFFICE O/P EST MOD 30 MIN: CPT | Mod: S$GLB,,, | Performed by: ORTHOPAEDIC SURGERY

## 2023-10-24 NOTE — PROGRESS NOTES
[facility]   Physical Therapy Treatment Note      Name: Tato Godinez  Clinic Number: [id number]    Therapy Diagnosis:   Encounter Diagnosis   Name Primary?    Acute pain of right knee Yes     Physician: Lamine Early MD    Visit Date: 10/24/2023    Physician Orders: PT Eval and Treat   Medical Diagnosis from Referral: 3.411A (ICD-10-CM) - Tear of medial collateral ligament of right knee, initial encounter  Evaluation Date: 6/30/2023  Authorization Period Expiration: 7/30/2023  Plan of Care Expiration: 9/30/2023  Progress Note Due: 8/10/2023  Visit # / Visits authorized: 28/40     Time In: 1302  Time Out: 1400  Total Billable Time: 53 minutes    FOTO IE 6/30: 29  FOTO 2: 8/22: 66  FOTO 3:    Precautions: Standard, post-op MCL repair protocol    Procedure by Sharath: 6/28/23   1. Right knee MCL repair with internal brace  2. Diagnostic right knee arthroscopy     The postop plan for the patient is to follow our MCL repair protocol.  Hinged knee brace locked in extension weightbear as tolerated for now.    Subjective     Pt reports: her knee has been feeling better since last week. She states MD isn't concerned about her knee and that it feels stable. He cleared her to return to what we were working on before and to return to him PRN.     She was compliant with home exercise program.  Response to previous treatment: mild-moderate quad soreness  Functional change: no significant change at this time    Pain: not verbalized/10  Location: right knee      Objective      DOS: 6/28/23  POD: 16 weeks, 6 days as of 10/24    Knee Passive Range of Motion:    Right  Left    Flexion 125 125   Extension +2 +3      +Raul for 2 joint musculature and lateral hip stiffness    Don testing 70 degrees: L 46.4 kgs, 33.5 kgs    Treatment     Tato received the treatments listed below:      therapeutic exercises to develop ROM, strength, flexibility, endurance for 10 minutes including:    Ext hinge  "strap stretch 5 x 20"  Waddles GTB x 2 turf laps    NP:  LLLD heel prop 8# x 5'  DL leg press 150# 2 x 10  SL leg press 100# 2 x 10  LAQ matrix 35# 3 x 10  HS curl 25 lbs 3 x 10     manual therapy techniques: joint mobilizations and/or soft tissue mobilizations were applied to the: knee for 03 minutes, including:     Assessment of knee   Patellar mobilizations all planes IV  Infrapatellar fat pad mobs   Gr III-IV posterior tibiofemoral glide with tibial internal rotation      neuromuscular re-education activities to improve: motor control, balance, muscle activation, proprioception, posture for 20 minutes. The following activities were included:    Quad sets into hyperext 10" hold x 4'  Long sitting SLR 2.5# 3 x 10 x 3"  LAQ hammer 20# 3 x 8-10  Split squats c single UE support 2 x 10 naseem     NP:  LAQ hammer 25# 2 x 15  Sball HS curl 3 x burn   S/L hip ABD 3 x 15 x 3"  Bridges YTB 2 x 10 x 10"  S/L clams YTB 15 x 10" naseem     therapeutic activities to improve functional performance for 23 minutes, including:    Upright bike lvl 5 x 10' for LE strengthening/cardiovascular endurance  Ecc SL leg press 120# 3 x 12  DL squats on ramp 20# 3 x 12-15        Patient Education and Home Exercises       Education provided:   - HEP     Written Home Exercises Provided: Continue prior HEP. Exercises were reviewed and Tato was able to demonstrate them prior to the end of the session.  Tato demonstrated good  understanding of the education provided. See EMR under Patient Instructions for exercises provided during therapy sessions.    Assessment     Tato did great today. She was able to resume OKC and CKC loading with no c/o knee pain. Good tolerance of initiation of split squats with cueing to avoid any dynamic valgus stress. Will assess response to today's tx and progress as appropriate. No adverse effects reported p tx.     Tato is progressing well towards her goals.   Pt prognosis is Excellent.     Pt will continue to " benefit from skilled outpatient physical therapy to address the deficits listed in the problem list box on initial evaluation, provide pt/family education and to maximize pt's level of independence in the home and community environment.     Pt's spiritual, cultural and educational needs considered and pt agreeable to plan of care and goals.     Anticipated barriers to physical therapy: None    GOALS: Short Term Goals:  6 weeks  1.Report decreased knee pain  < / =  3/10  to increase tolerance for exercise  2. Increase knee ROM to 125 degrees flexion in order to be able to perform ADLs without difficulty.  3. Increase strength by 1/3 MMT grade in quadriceps  to increase tolerance for ADL and work activities.  4. Pt to tolerate HEP to improve ROM and independence with ADL's     Long Term Goals: 24 weeks  1.Report decreased knee pain < / = 0/10  to increase tolerance for ADLs  2.Patient goal: Return to work in August  3.Increase strength to >/= 4+/5 in quadriceps  to increase tolerance for ADL and work activities.  4. Pt will report at CJ level (20-40% impaired) on FOTO knee to demonstrate increase in LE function with every day tasks.      Plan     Outpatient Physical Therapy 2 times weekly for 10 weeks to include the following interventions: manual therapy, therapeutic exercise, therapeutic activities, and neuromuscular re-education.    Monik Hope, PTA

## 2023-10-24 NOTE — PROGRESS NOTES
"CC: Right knee post op 4 months    Patient is here for her 4 months post op appointment s/p below and is doing well. Patient is doing PT at [facility]. Patient had a recent fall. Pain has decreased since this injury.   No pain today or instability.     DATE OF PROCEDURE: 6/28/2023     PREOPERATIVE DIAGNOSES:   1. Right knee high grade medial collateral ligament (MCL) tear.      POSTOPERATIVE DIAGNOSES:   1. Right knee high grade medial collateral ligament (MCL) tear.      PROCEDURES:   1. Right knee MCL repair with internal brace  2. Diagnostic right knee arthroscopy        SURGEON: Lamine Early M.D.        PE:    /70   Pulse 77   Ht 5' 2" (1.575 m)   Wt 85 kg (187 lb 6.3 oz)   BMI 34.27 kg/m²      Right knee:    Incisions clean/dry/intact  No sign of infection  Mild swelling  Compartments soft  Neurovascular status intact in extremity    PROM 0 to 120 degrees  Decreased quad strength  Minimal to no effusion    Assessment:  4 months s/p right knee MCL repair with internal brace, diagnostic arthroscopy    Plan:  1.  Continue PT    2. F/u PRN     All questions were answered. Instructed patient to call with questions or concerns in the interim.         "

## 2023-10-27 ENCOUNTER — CLINICAL SUPPORT (OUTPATIENT)
Dept: REHABILITATION | Facility: HOSPITAL | Age: 43
End: 2023-10-27
Payer: COMMERCIAL

## 2023-10-27 DIAGNOSIS — M25.561 ACUTE PAIN OF RIGHT KNEE: Primary | ICD-10-CM

## 2023-10-27 PROCEDURE — 97110 THERAPEUTIC EXERCISES: CPT | Mod: CQ

## 2023-10-27 PROCEDURE — 97530 THERAPEUTIC ACTIVITIES: CPT | Mod: CQ

## 2023-10-27 PROCEDURE — 97112 NEUROMUSCULAR REEDUCATION: CPT | Mod: CQ

## 2023-10-27 NOTE — PROGRESS NOTES
"[facility]   Physical Therapy Treatment Note      Name: Tato Godinez  Clinic Number: [id number]    Therapy Diagnosis:   Encounter Diagnosis   Name Primary?    Acute pain of right knee Yes     Physician: Lamine Early MD    Visit Date: 10/27/2023    Physician Orders: PT Eval and Treat   Medical Diagnosis from Referral: 3.411A (ICD-10-CM) - Tear of medial collateral ligament of right knee, initial encounter  Evaluation Date: 6/30/2023  Authorization Period Expiration: 7/30/2023  Plan of Care Expiration: 9/30/2023  Progress Note Due: 8/10/2023  Visit # / Visits authorized: 28/40     Time In: 1302  Time Out: 1400  Total Billable Time: 50 minutes    FOTO IE 6/30: 29  FOTO 2: 8/22: 66  FOTO 3:    Precautions: Standard, post-op MCL repair protocol    Procedure by Sharath: 6/28/23   1. Right knee MCL repair with internal brace  2. Diagnostic right knee arthroscopy     The postop plan for the patient is to follow our MCL repair protocol.  Hinged knee brace locked in extension weightbear as tolerated for now.    Subjective     Pt reports: no new complaints.     She was compliant with home exercise program.  Response to previous treatment: mild-moderate quad soreness  Functional change: no significant change at this time    Pain: not verbalized/10  Location: right knee      Objective      DOS: 6/28/23  POD: 17 weeks, 2 days as of 10/27    Knee Passive Range of Motion:    Right  Left    Flexion 125 125   Extension +2 +3      +Raul for 2 joint musculature and lateral hip stiffness    Don testing 70 degrees: L 46.4 kgs, 33.5 kgs    Treatment     Tato received the treatments listed below:      therapeutic exercises to develop ROM, strength, flexibility, endurance for 12 minutes including:    Ext hinge strap stretch 5 x 20"  Waddles GTB x 2 turf laps    NP:  LLLD heel prop 8# x 5'  DL leg press 150# 2 x 10  SL leg press 100# 2 x 10  LAQ matrix 35# 3 x 10  HS curl 25 lbs 3 x 10   " "  manual therapy techniques: joint mobilizations and/or soft tissue mobilizations were applied to the: knee for 03 minutes, including:     Assessment of knee   Patellar mobilizations all planes IV  Infrapatellar fat pad mobs   Gr III-IV posterior tibiofemoral glide with tibial internal rotation      neuromuscular re-education activities to improve: motor control, balance, muscle activation, proprioception, posture for 20 minutes. The following activities were included:    Quad sets into hyperext 10" hold x 3'  Long sitting SLR 2.5# 3 x 10 x 3"  LAQ hammer 22.5# 3 x 8    NP:  Sball HS curl 3 x burn   S/L hip ABD 3 x 15 x 3"  Bridges YTB 2 x 10 x 10"  S/L clams YTB 15 x 10" naseem     therapeutic activities to improve functional performance for 18 minutes, including:    Upright bike lvl 5 x 10' for LE strengthening/cardiovascular endurance  Split squats c single UE support 10# 3 x 8 naseem     NP:  Ecc SL leg press 120# 3 x 12  DL squats on ramp 20# 3 x 12-15        Patient Education and Home Exercises       Education provided:   - HEP     Written Home Exercises Provided: Continue prior HEP. Exercises were reviewed and Tato was able to demonstrate them prior to the end of the session.  Tato demonstrated good  understanding of the education provided. See EMR under Patient Instructions for exercises provided during therapy sessions.    Assessment     Tato did well today. Good tolerance of progressed split squats and LAQ hammer. Will continue to progress as appropriate. No adverse effects reported p tx.     Tato is progressing well towards her goals.   Pt prognosis is Excellent.     Pt will continue to benefit from skilled outpatient physical therapy to address the deficits listed in the problem list box on initial evaluation, provide pt/family education and to maximize pt's level of independence in the home and community environment.     Pt's spiritual, cultural and educational needs considered and pt agreeable to " plan of care and goals.     Anticipated barriers to physical therapy: None    GOALS: Short Term Goals:  6 weeks  1.Report decreased knee pain  < / =  3/10  to increase tolerance for exercise  2. Increase knee ROM to 125 degrees flexion in order to be able to perform ADLs without difficulty.  3. Increase strength by 1/3 MMT grade in quadriceps  to increase tolerance for ADL and work activities.  4. Pt to tolerate HEP to improve ROM and independence with ADL's     Long Term Goals: 24 weeks  1.Report decreased knee pain < / = 0/10  to increase tolerance for ADLs  2.Patient goal: Return to work in August  3.Increase strength to >/= 4+/5 in quadriceps  to increase tolerance for ADL and work activities.  4. Pt will report at CJ level (20-40% impaired) on FOTO knee to demonstrate increase in LE function with every day tasks.      Plan     Outpatient Physical Therapy 2 times weekly for 10 weeks to include the following interventions: manual therapy, therapeutic exercise, therapeutic activities, and neuromuscular re-education.    Monik Hope, PTA

## 2023-10-31 ENCOUNTER — CLINICAL SUPPORT (OUTPATIENT)
Dept: REHABILITATION | Facility: HOSPITAL | Age: 43
End: 2023-10-31
Payer: COMMERCIAL

## 2023-10-31 DIAGNOSIS — M25.561 ACUTE PAIN OF RIGHT KNEE: Primary | ICD-10-CM

## 2023-10-31 PROCEDURE — 97530 THERAPEUTIC ACTIVITIES: CPT

## 2023-10-31 PROCEDURE — 97112 NEUROMUSCULAR REEDUCATION: CPT

## 2023-10-31 NOTE — PROGRESS NOTES
[facility]   Physical Therapy Treatment Note      Name: Tato Godinez  Clinic Number: [id number]    Therapy Diagnosis:   Encounter Diagnosis   Name Primary?    Acute pain of right knee Yes     Physician: Lamine Early MD    Visit Date: 10/31/2023  Physician Orders: PT Eval and Treat   Medical Diagnosis from Referral: 3.411A (ICD-10-CM) - Tear of medial collateral ligament of right knee, initial encounter  Evaluation Date: 6/30/2023  Authorization Period Expiration: 7/30/2023  Plan of Care Expiration: 9/30/2023  Progress Note Due: 8/10/2023  Visit # / Visits authorized: 29/40     Time In: 12:58 pm   Time Out: 2:02 pm  Total Billable Time: 64 minutes    FOTO IE 6/30: 29  FOTO 2: 8/22: 66  FOTO 3:    Precautions: Standard, post-op MCL repair protocol    Procedure by Sharath: 6/28/23   1. Right knee MCL repair with internal brace  2. Diagnostic right knee arthroscopy     The postop plan for the patient is to follow our MCL repair protocol.  Hinged knee brace locked in extension weightbear as tolerated for now.    Subjective     Pt reports: She was sore after last session but a good sore. No pain just some stiffness especially after prolonged sitting and getting up to stand.   She was compliant with home exercise program.  Response to previous treatment: mild-moderate quad soreness  Functional change: no significant change at this time    Pain: not verbalized/10  Location: right knee      Objective      DOS: 6/28/23  POD: 17 weeks, 6 days as of 10/31    Observation 10/31/2023:  Range of Motion   Knee   Right AROM/PROM  Left AROM/PROM    Flexion 125 degrees / 125 degrees  125 degrees / 125 degrees    Extension  +3 degrees / +3 degrees  +3 degrees / +3 degrees      Don Testing (10/31/2023):   Right  Left    Trial 1 40.6 kg 42.8 kg   Trial 2 40.5 kg 44.5 kg   Trial 3 39.3 kg  43.2 kg    Average  40.1 kg  43.5 kg       Treatment     Tato received the treatments listed below:   "    therapeutic exercises to develop ROM, strength, flexibility, endurance for 00 minutes including:    NP:  LLLD heel prop 8# x 5'  DL leg press 150# 2 x 10  SL leg press 100# 2 x 10  LAQ matrix 35# 3 x 10  HS curl 25 lbs 3 x 10  Ext hinge strap stretch 5 x 20"    manual therapy techniques: joint mobilizations and/or soft tissue mobilizations were applied to the: knee for 06 minutes, including:     Assessment of knee   Patellar mobilizations all planes IV  Infrapatellar fat pad mobs     Not Today:   Gr III-IV posterior tibiofemoral glide with tibial internal rotation      neuromuscular re-education activities to improve: motor control, balance, muscle activation, proprioception, posture for 43 minutes. The following activities were included:    Assessment as above  Quad sets into hyper extensions 10 x 10" holds   Longsitting SLR 2.5# 3 x 8 x 3" holds   Lateral band walks GreenTB 3 laps on turf   LAQ hammer 20# 3 x 8 each   DL Bridge into HS curl 3 x 12 reps   Sneaky lunges 2 x 10 x 5" holds     NP:  Sball HS curl 3 x burn   S/L hip ABD 3 x 15 x 3"  Bridges YTB 2 x 10 x 10"  S/L clams YTB 15 x 10" naseem     therapeutic activities to improve functional performance for 15 minutes, including:    Upright bike lvl 5 x 10' for LE strengthening/cardiovascular endurance  Step downs 5-inch with mirror for visual feedback 3 x6-8 reps     NP:  Ecc SL leg press 120# 3 x 12  DL squats on ramp 20# 3 x 12-15  Split squats c single UE support 10# 3 x 8 naseem       Patient Education and Home Exercises       Education provided:   - HEP     Written Home Exercises Provided: Continue prior HEP. Exercises were reviewed and Tato was able to demonstrate them prior to the end of the session.  Taot demonstrated good  understanding of the education provided. See EMR under Patient Instructions for exercises provided during therapy sessions.    Assessment     Tato tolerated therapy session well today. She presented with good range of " motion and good quad activation into hyperextension. She was further assessed with crane testing and improvement compared to last assessment. Further challenged with continued quad strengthening and progression with sneaky lunges for soleus strengthening and balance/stability work. She was adequately challenged and fatigued end of session with no adverse effects reported. Will continue to monitor and progress as able.     Tato is progressing well towards her goals.   Pt prognosis is Excellent.     Pt will continue to benefit from skilled outpatient physical therapy to address the deficits listed in the problem list box on initial evaluation, provide pt/family education and to maximize pt's level of independence in the home and community environment.     Pt's spiritual, cultural and educational needs considered and pt agreeable to plan of care and goals.     Anticipated barriers to physical therapy: None    GOALS: Short Term Goals:  6 weeks  1.Report decreased knee pain  < / =  3/10  to increase tolerance for exercise  2. Increase knee ROM to 125 degrees flexion in order to be able to perform ADLs without difficulty. - MET  3. Increase strength by 1/3 MMT grade in quadriceps  to increase tolerance for ADL and work activities.  4. Pt to tolerate HEP to improve ROM and independence with ADL's - MET     Long Term Goals: 24 weeks  1.Report decreased knee pain < / = 0/10  to increase tolerance for ADLs  2.Patient goal: Return to work in August  3.Increase strength to >/= 4+/5 in quadriceps  to increase tolerance for ADL and work activities.  4. Pt will report at CJ level (20-40% impaired) on FOTO knee to demonstrate increase in LE function with every day tasks.      Plan     Outpatient Physical Therapy 2 times weekly for 10 weeks to include the following interventions: manual therapy, therapeutic exercise, therapeutic activities, and neuromuscular re-education.    Shefali Haney, PT, DPT, OCS

## 2023-11-03 ENCOUNTER — CLINICAL SUPPORT (OUTPATIENT)
Dept: REHABILITATION | Facility: HOSPITAL | Age: 43
End: 2023-11-03
Payer: COMMERCIAL

## 2023-11-03 DIAGNOSIS — M25.561 ACUTE PAIN OF RIGHT KNEE: Primary | ICD-10-CM

## 2023-11-03 PROCEDURE — 97530 THERAPEUTIC ACTIVITIES: CPT | Mod: CQ

## 2023-11-03 PROCEDURE — 97112 NEUROMUSCULAR REEDUCATION: CPT | Mod: CQ

## 2023-11-03 NOTE — PROGRESS NOTES
[facility]   Physical Therapy Treatment Note      Name: Tato Godinez  Clinic Number: [id number]    Therapy Diagnosis:   Encounter Diagnosis   Name Primary?    Acute pain of right knee Yes     Physician: Lamine Early MD    Visit Date: 11/3/2023  Physician Orders: PT Eval and Treat   Medical Diagnosis from Referral: 3.411A (ICD-10-CM) - Tear of medial collateral ligament of right knee, initial encounter  Evaluation Date: 6/30/2023  Authorization Period Expiration: 7/30/2023  Plan of Care Expiration: 9/30/2023  Progress Note Due: 8/10/2023  Visit # / Visits authorized: 30/40     Time In: 1301   Time Out: 1400  Total Billable Time: 50 minutes    FOTO IE 6/30: 29  FOTO 2: 8/22: 66  FOTO 11/3:   FOTO goal: 69    Precautions: Standard, post-op MCL repair protocol    Procedure by Sharath: 6/28/23   1. Right knee MCL repair with internal brace  2. Diagnostic right knee arthroscopy     The postop plan for the patient is to follow our MCL repair protocol.  Hinged knee brace locked in extension weightbear as tolerated for now.    Subjective     Pt reports: no c/o knee pain upon entry.     She was compliant with home exercise program.  Response to previous treatment: mild-moderate quad soreness  Functional change: no significant change at this time    Pain: not verbalized/10  Location: right knee      Objective      DOS: 6/28/23  POD: 18 weeks, 2 days as of 11/3    Observation 10/31/2023:  Range of Motion   Knee   Right AROM/PROM  Left AROM/PROM    Flexion 125 degrees / 125 degrees  125 degrees / 125 degrees    Extension  +3 degrees / +3 degrees  +3 degrees / +3 degrees      Don Testing (10/31/2023):   Right  Left    Trial 1 40.6 kg 42.8 kg   Trial 2 40.5 kg 44.5 kg   Trial 3 39.3 kg  43.2 kg    Average  40.1 kg  43.5 kg       Treatment     Tato received the treatments listed below:      therapeutic exercises to develop ROM, strength, flexibility, endurance for 00 minutes  "including:    NP:  LLLD heel prop 8# x 5'  DL leg press 150# 2 x 10  SL leg press 100# 2 x 10  LAQ matrix 35# 3 x 10  HS curl 25 lbs 3 x 10  Ext hinge strap stretch 5 x 20"    manual therapy techniques: joint mobilizations and/or soft tissue mobilizations were applied to the: knee for 03 minutes, including:     Assessment of knee   Patellar mobilizations all planes IV  Infrapatellar fat pad mobs     Not Today:   Gr III-IV posterior tibiofemoral glide with tibial internal rotation      neuromuscular re-education activities to improve: motor control, balance, muscle activation, proprioception, posture for 26 minutes. The following activities were included:    Quad sets into hyper extensions 10 x 10"    Longsitting SLR 3# 3 x 8 x 3" holds   LAQ hammer 25# 3 x 8-10  Figure 4 SL bridge 2 x 8 naseem   Standing clams GTB 3 x 6 naseem     NP:  Sball HS curl 3 x burn   S/L hip ABD 3 x 15 x 3"  Bridges YTB 2 x 10 x 10"  S/L clams YTB 15 x 10" naseem   DL Bridge into HS curl 3 x 12 reps   Sneaky lunges 2 x 10 x 5" holds     therapeutic activities to improve functional performance for 24 minutes, including:    Upright bike lvl 5 x 8' for LE strengthening/cardiovascular endurance  Waddles GTB x 2 turf laps  DL squats hex bar +40# 4 x 8-10    NP:  Ecc SL leg press 120# 3 x 12  DL squats on ramp 20# 3 x 12-15  Split squats c single UE support 10# 3 x 8 naseem   Step downs 5-inch with mirror for visual feedback 3 x6-8 reps       Patient Education and Home Exercises       Education provided:   - HEP     Written Home Exercises Provided: Continue prior HEP. Exercises were reviewed and Tato was able to demonstrate them prior to the end of the session.  Tato demonstrated good  understanding of the education provided. See EMR under Patient Instructions for exercises provided during therapy sessions.    Assessment     Tato did great today. She was able to progress OKC LAQ load to what she was previously kicking prior to recent fall. Pt " demonstrated good DL squat form with hex bar. Max fatigue during initiation of standing clams at end of tx. Will continue to progress as tolerated. No adverse effects reported p tx.     Tato is progressing well towards her goals.   Pt prognosis is Excellent.     Pt will continue to benefit from skilled outpatient physical therapy to address the deficits listed in the problem list box on initial evaluation, provide pt/family education and to maximize pt's level of independence in the home and community environment.     Pt's spiritual, cultural and educational needs considered and pt agreeable to plan of care and goals.     Anticipated barriers to physical therapy: None    GOALS: Short Term Goals:  6 weeks  1.Report decreased knee pain  < / =  3/10  to increase tolerance for exercise  2. Increase knee ROM to 125 degrees flexion in order to be able to perform ADLs without difficulty. - MET  3. Increase strength by 1/3 MMT grade in quadriceps  to increase tolerance for ADL and work activities.  4. Pt to tolerate HEP to improve ROM and independence with ADL's - MET     Long Term Goals: 24 weeks  1.Report decreased knee pain < / = 0/10  to increase tolerance for ADLs  2.Patient goal: Return to work in August  3.Increase strength to >/= 4+/5 in quadriceps  to increase tolerance for ADL and work activities.  4. Pt will report at CJ level (20-40% impaired) on FOTO knee to demonstrate increase in LE function with every day tasks.      Plan     Outpatient Physical Therapy 2 times weekly for 10 weeks to include the following interventions: manual therapy, therapeutic exercise, therapeutic activities, and neuromuscular re-education.    Monik Hope, PTA

## 2023-11-07 ENCOUNTER — TELEPHONE (OUTPATIENT)
Dept: SPORTS MEDICINE | Facility: CLINIC | Age: 43
End: 2023-11-07
Payer: COMMERCIAL

## 2023-11-07 NOTE — TELEPHONE ENCOUNTER
----- Message from Lexus Vásquez sent at 11/7/2023 10:33 AM CST -----  Regarding: appt  pt calling to see if she still needs to come  in for her scheduled appt on 11-14 , pt was seen on 10-24 for a fall , please call appt still showing in her chart     Confirmed patient's contact info below:  Contact Name: Tato Godinez  Phone Number: [phone]

## 2023-11-15 ENCOUNTER — TELEPHONE (OUTPATIENT)
Dept: OBSTETRICS AND GYNECOLOGY | Facility: CLINIC | Age: 43
End: 2023-11-15
Payer: COMMERCIAL

## 2023-11-15 NOTE — TELEPHONE ENCOUNTER
----- Message from Mike Tracy MA sent at 11/15/2023 11:11 AM CST -----  Regarding: FW: returning call    ----- Message -----  From: Tara Montero  Sent: 11/15/2023  10:05 AM CST  To: Sourav Hernandez Staff  Subject: returning call                                     Type:  Patient Returning Call    Who Called:  CARON ARMSTRONG [[id number]]    Who Left Message for Patient:Anna     Does the patient know what this is regarding? Scheduling appt    Best Call Back Number: [phone]    Additional Information:

## 2023-11-17 ENCOUNTER — CLINICAL SUPPORT (OUTPATIENT)
Dept: REHABILITATION | Facility: HOSPITAL | Age: 43
End: 2023-11-17
Payer: COMMERCIAL

## 2023-11-17 DIAGNOSIS — M25.561 ACUTE PAIN OF RIGHT KNEE: Primary | ICD-10-CM

## 2023-11-17 PROCEDURE — 97112 NEUROMUSCULAR REEDUCATION: CPT | Mod: CQ

## 2023-11-17 PROCEDURE — 97530 THERAPEUTIC ACTIVITIES: CPT | Mod: CQ
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
## 2023-11-24 ENCOUNTER — HOSPITAL ENCOUNTER (OUTPATIENT)
Dept: RADIOLOGY | Facility: HOSPITAL | Age: 43
Discharge: HOME | End: 2023-11-24
Payer: COMMERCIAL

## 2023-11-24 ENCOUNTER — TELEPHONE (OUTPATIENT)
Dept: OBSTETRICS AND GYNECOLOGY | Facility: CLINIC | Age: 43
End: 2023-11-24
Payer: COMMERCIAL

## 2023-11-24 VITALS — BODY MASS INDEX: 34.41 KG/M2 | HEIGHT: 62 IN | WEIGHT: 187 LBS

## 2023-11-24 DIAGNOSIS — Z12.31 ENCOUNTER FOR SCREENING MAMMOGRAM FOR BREAST CANCER: ICD-10-CM

## 2023-11-24 PROCEDURE — 77063 BREAST TOMOSYNTHESIS BI: CPT | Mod: 26,,, | Performed by: RADIOLOGY

## 2023-11-24 PROCEDURE — 77067 SCR MAMMO BI INCL CAD: CPT | Mod: TC

## 2023-11-24 PROCEDURE — 77067 MAMMO DIGITAL SCREENING BILAT WITH TOMO: ICD-10-PCS | Mod: 26,,, | Performed by: RADIOLOGY

## 2023-11-24 PROCEDURE — 77067 SCR MAMMO BI INCL CAD: CPT | Mod: 26,,, | Performed by: RADIOLOGY

## 2023-11-24 PROCEDURE — 77063 MAMMO DIGITAL SCREENING BILAT WITH TOMO: ICD-10-PCS | Mod: 26,,, | Performed by: RADIOLOGY

## 2023-11-24 NOTE — TELEPHONE ENCOUNTER
----- Message from Lilia Burris sent at 11/24/2023 12:30 PM CST -----  Regarding: Appointment Access            Name of Who is Calling:  Tato Godinez    Who Leftv The Message:Tato Godinez      What is the request in detail:  Patient called requesting to schedule her annual [employer] visit with Mary Benjamin CNM. I did attempt to schedule per request but I was unsuccessful . Please further advise. Thank you    Reply by [facility]:   Yes      Preferred Call Back :   [phone] (U)

## 2023-11-28 ENCOUNTER — CLINICAL SUPPORT (OUTPATIENT)
Dept: REHABILITATION | Facility: HOSPITAL | Age: 43
End: 2023-11-28
Payer: COMMERCIAL

## 2023-11-28 DIAGNOSIS — M25.561 ACUTE PAIN OF RIGHT KNEE: Primary | ICD-10-CM

## 2023-11-28 PROCEDURE — 97112 NEUROMUSCULAR REEDUCATION: CPT | Performed by: PHYSICAL THERAPIST

## 2023-11-28 PROCEDURE — 97530 THERAPEUTIC ACTIVITIES: CPT | Performed by: PHYSICAL THERAPIST

## 2023-11-28 NOTE — PROGRESS NOTES
[facility]   Physical Therapy Treatment Note      Name: Tato Godinez  Clinic Number: [id number]    Therapy Diagnosis:   Encounter Diagnosis   Name Primary?    Acute pain of right knee Yes     Physician: Lamine Early MD    Visit Date: 11/28/2023  Physician Orders: PT Eval and Treat   Medical Diagnosis from Referral: 3.411A (ICD-10-CM) - Tear of medial collateral ligament of right knee, initial encounter  Evaluation Date: 6/30/2023  Authorization Period Expiration: 7/30/2023  Plan of Care Expiration: 9/30/2023  Progress Note Due: 8/10/2023  Visit # / Visits authorized: 31/40     Time In: 1303  Time Out: 1210  Total Billable Time: 47 minutes    FOTO IE 6/30: 29  FOTO 2: 8/22: 66  FOTO 11/3:   FOTO goal: 69    Precautions: Standard, post-op MCL repair protocol    Procedure by Sharath: 6/28/23   1. Right knee MCL repair with internal brace  2. Diagnostic right knee arthroscopy     The postop plan for the patient is to follow our MCL repair protocol.  Hinged knee brace locked in extension weightbear as tolerated for now.    Subjective     Pt reports: Minimal symptoms. Some pain in the morning but it goes away in 5 minutes.    She was compliant with home exercise program.  Response to previous treatment: mild-moderate quad soreness  Functional change: no significant change at this time    Pain: not verbalized/10  Location: right knee      Objective      DOS: 6/28/23  POD: 20 weeks, 2 days as of 11/17    Observation 10/31/2023:  Range of Motion   Knee   Right AROM/PROM  Left AROM/PROM    Flexion 125 degrees / 125 degrees  125 degrees / 125 degrees    Extension  +3 degrees / +3 degrees  +3 degrees / +3 degrees      Don Testing (10/31/2023):   Right  Left    Trial 1 40.6 kg 42.8 kg   Trial 2 40.5 kg 44.5 kg   Trial 3 39.3 kg  43.2 kg    Average  40.1 kg  43.5 kg       Treatment     Tato received the treatments listed below:      therapeutic exercises to develop ROM, strength,  "flexibility, endurance for 00 minutes including:    NP:  LLLD heel prop 8# x 5'  DL leg press 150# 2 x 10  SL leg press 100# 2 x 10  LAQ matrix 35# 3 x 10  HS curl 25 lbs 3 x 10  Ext hinge strap stretch 5 x 20"    manual therapy techniques: joint mobilizations and/or soft tissue mobilizations were applied to the: knee for 03 minutes, including:     Assessment of knee   Patellar mobilizations all planes IV  Infrapatellar fat pad mobs     Not Today:   Gr III-IV posterior tibiofemoral glide with tibial internal rotation      neuromuscular re-education activities to improve: motor control, balance, muscle activation, proprioception, posture for 23 minutes. The following activities were included:    Quad sets into hyper extensions 10 x 10"    Longsitting SLR 3# 3 x 8 x 3" holds   LAQ hammer 25# 3 x 8-10  Standing clams GTB 3 x 6 naseem     NP:  Figure 4 SL bridge 2 x 8 naseem   Sball HS curl 3 x burn   S/L hip ABD 3 x 15 x 3"  Bridges YTB 2 x 10 x 10"  S/L clams YTB 15 x 10" naseem   DL Bridge into HS curl 3 x 12 reps   Sneaky lunges 2 x 10 x 5" holds     therapeutic activities to improve functional performance for 24 minutes, including:    Upright bike lvl 5 x 8' for LE strengthening/cardiovascular endurance  Waddles GTB x 2 turf laps  SL Leg press 150 lbs 4 x 8    NP:  DL squats hex bar +40# 4 x 8-10  Ecc SL leg press 120# 3 x 12  DL squats on ramp 20# 3 x 12-15  Split squats c single UE support 10# 3 x 8 naseem   Step downs 5-inch with mirror for visual feedback 3 x6-8 reps       Patient Education and Home Exercises       Education provided:   - HEP     Written Home Exercises Provided: Continue prior HEP. Exercises were reviewed and Tato was able to demonstrate them prior to the end of the session.  Tato demonstrated good  understanding of the education provided. See EMR under Patient Instructions for exercises provided during therapy sessions.    Assessment     Continues to progress well. Strength test isomerically next " lucio Godwin is progressing well towards her goals.   Pt prognosis is Excellent.     Pt will continue to benefit from skilled outpatient physical therapy to address the deficits listed in the problem list box on initial evaluation, provide pt/family education and to maximize pt's level of independence in the home and community environment.     Pt's spiritual, cultural and educational needs considered and pt agreeable to plan of care and goals.     Anticipated barriers to physical therapy: None    GOALS: Short Term Goals:  6 weeks  1.Report decreased knee pain  < / =  3/10  to increase tolerance for exercise  2. Increase knee ROM to 125 degrees flexion in order to be able to perform ADLs without difficulty. - MET  3. Increase strength by 1/3 MMT grade in quadriceps  to increase tolerance for ADL and work activities.  4. Pt to tolerate HEP to improve ROM and independence with ADL's - MET     Long Term Goals: 24 weeks  1.Report decreased knee pain < / = 0/10  to increase tolerance for ADLs  2.Patient goal: Return to work in August  3.Increase strength to >/= 4+/5 in quadriceps  to increase tolerance for ADL and work activities.  4. Pt will report at CJ level (20-40% impaired) on FOTO knee to demonstrate increase in LE function with every day tasks.      Plan     Outpatient Physical Therapy 2 times weekly for 10 weeks to include the following interventions: manual therapy, therapeutic exercise, therapeutic activities, and neuromuscular re-education.    Gal Bran, PT

## 2023-12-06 ENCOUNTER — CLINICAL SUPPORT (OUTPATIENT)
Dept: REHABILITATION | Facility: HOSPITAL | Age: 43
End: 2023-12-06
Payer: COMMERCIAL

## 2023-12-06 DIAGNOSIS — M25.561 ACUTE PAIN OF RIGHT KNEE: Primary | ICD-10-CM

## 2023-12-06 PROCEDURE — 97530 THERAPEUTIC ACTIVITIES: CPT | Mod: CQ

## 2023-12-06 PROCEDURE — 97112 NEUROMUSCULAR REEDUCATION: CPT | Mod: CQ

## 2023-12-06 NOTE — PROGRESS NOTES
[facility]   Physical Therapy Treatment Note      Name: Tato Godinez  Clinic Number: [id number]    Therapy Diagnosis:   Encounter Diagnosis   Name Primary?    Acute pain of right knee Yes     Physician: Lamine Early MD    Visit Date: 12/6/2023  Physician Orders: PT Eval and Treat   Medical Diagnosis from Referral: 3.411A (ICD-10-CM) - Tear of medial collateral ligament of right knee, initial encounter  Evaluation Date: 6/30/2023  Authorization Period Expiration: 7/30/2023  Plan of Care Expiration: 9/30/2023  Progress Note Due: 8/10/2023  Visit # / Visits authorized: 33/40     Time In: 1334  Time Out: 1430  Total Billable Time: 46 minutes    FOTO IE 6/30: 29  FOTO 2: 8/22: 66  FOTO 11/3: 74  FOTO goal: 69    Precautions: Standard, post-op MCL repair protocol    Procedure by Sharath: 6/28/23   1. Right knee MCL repair with internal brace  2. Diagnostic right knee arthroscopy     The postop plan for the patient is to follow our MCL repair protocol.  Hinged knee brace locked in extension weightbear as tolerated for now.    Subjective     Pt reports: her knee is doing well. No pain, but she feels like it's still stiff at times.     She was compliant with home exercise program.  Response to previous treatment: mild-moderate quad soreness  Functional change: no significant change at this time    Pain: not verbalized/10  Location: right knee      Objective      DOS: 6/28/23  POD: 5 months, 8 days as of 12/6    Observation 10/31/2023:  Range of Motion   Knee   Right AROM/PROM  Left AROM/PROM    Flexion 125 degrees / 125 degrees  125 degrees / 125 degrees    Extension  +3 degrees / +3 degrees  +3 degrees / +3 degrees      Don Testing (10/31/2023):   Right  Left    Trial 1 40.6 kg 42.8 kg   Trial 2 40.5 kg 44.5 kg   Trial 3 39.3 kg  43.2 kg    Average  40.1 kg  43.5 kg     12/6 objective strength testing:   Knee Extension iso at 60° (kg)                  Right Left                " 1 41.9 46.6                2 45.4 44.4                3 45.9 52.9                   Avg 44.4 48.0  7.4% strength deficit     Knee Flexion iso at 60° (kg)                  Right Left                1 26.6 30.5                2 26.5 29.4                3 27.9 29.9                Avg 27.0 29.9   8% strength deficit              Treatment     Tato received the treatments listed below:      therapeutic exercises to develop ROM, strength, flexibility, endurance for 00 minutes including:    NP:  LLLD heel prop 8# x 5'  DL leg press 150# 2 x 10  SL leg press 100# 2 x 10  LAQ matrix 35# 3 x 10  HS curl 25 lbs 3 x 10  Ext hinge strap stretch 5 x 20"    manual therapy techniques: joint mobilizations and/or soft tissue mobilizations were applied to the: knee for 03 minutes, including:     Assessment of knee   Patellar mobilizations all planes IV  Infrapatellar fat pad mobs     Not Today:   Gr III-IV posterior tibiofemoral glide with tibial internal rotation      neuromuscular re-education activities to improve: motor control, balance, muscle activation, proprioception, posture for 16 minutes. The following activities were included:    Quad sets into hyper extensions 10 x 10"    Longsitting SLR 3# 3 x 8 x 3" holds   LAQ hammer 35# 3x to fatigue naseem     NP:  Figure 4 SL bridge 2 x 8 naseem   Sball HS curl 3 x burn   S/L hip ABD 3 x 15 x 3"  Bridges YTB 2 x 10 x 10"  S/L clams YTB 15 x 10" naseem   DL Bridge into HS curl 3 x 12 reps   Sneaky lunges 2 x 10 x 5" holds   Standing clams GTB 3 x 6 naseem     therapeutic activities to improve functional performance for 30 minutes, including:    Upright bike lvl 5 x 8' for LE strengthening/cardiovascular endurance  Waddles GTB x 2 turf laps  Objective strength testing    NP:  DL squats hex bar +40# 4 x 8-10  Ecc SL leg press 120# 3 x 12  DL squats on ramp 20# 3 x 12-15  Split squats c single UE support 10# 3 x 8 naseem   Step downs 5-inch with mirror for visual feedback 3 x6-8 reps   SL Leg " press 150 lbs 4 x 8      Patient Education and Home Exercises       Education provided:   - HEP     Written Home Exercises Provided: Continue prior HEP. Exercises were reviewed and Tato was able to demonstrate them prior to the end of the session.  Tato demonstrated good  understanding of the education provided. See EMR under Patient Instructions for exercises provided during therapy sessions.    Assessment     Tato is progressing well at this time. She has shown improvement in objective testing since 10/31. Will continue to progress as tolerated. No adverse effects reported p tx.     Tato is progressing well towards her goals.   Pt prognosis is Excellent.     Pt will continue to benefit from skilled outpatient physical therapy to address the deficits listed in the problem list box on initial evaluation, provide pt/family education and to maximize pt's level of independence in the home and community environment.     Pt's spiritual, cultural and educational needs considered and pt agreeable to plan of care and goals.     Anticipated barriers to physical therapy: None    GOALS: Short Term Goals:  6 weeks  1.Report decreased knee pain  < / =  3/10  to increase tolerance for exercise  2. Increase knee ROM to 125 degrees flexion in order to be able to perform ADLs without difficulty. - MET  3. Increase strength by 1/3 MMT grade in quadriceps  to increase tolerance for ADL and work activities.  4. Pt to tolerate HEP to improve ROM and independence with ADL's - MET     Long Term Goals: 24 weeks  1.Report decreased knee pain < / = 0/10  to increase tolerance for ADLs  2.Patient goal: Return to work in August  3.Increase strength to >/= 4+/5 in quadriceps  to increase tolerance for ADL and work activities.  4. Pt will report at CJ level (20-40% impaired) on FOTO knee to demonstrate increase in LE function with every day tasks.      Plan     Outpatient Physical Therapy 2 times weekly for 10 weeks to include the  following interventions: manual therapy, therapeutic exercise, therapeutic activities, and neuromuscular re-education.    Monik Hope, PTA

## 2023-12-11 NOTE — ANESTHESIA POST-OP PAIN MANAGEMENT
"Acute Pain Service Progress Note    6/29/2023 1324    Patient contacted regarding [facility] infusion follow up. Reports that pain has been well controlled with the infusion pump. Denies signs of local anesthetic toxicity. PNC dressing remains clean, dry, and intact. All questions answered. Reminded patient that the infusion should be discontinued and PNC removed whenever the "infusion complete" alert is seen on the display screen or by POD 5 (7/3/23) at 12pm, whichever comes first. Encouraged patient to call the [facility] 24/7 support line at [phone] or the [facility] line at [phone] for any [facility] related questions/issues. Verbalizes understanding. Will continue to follow up.          "
Attempted to call Tato Godinez at 5:53 PM on 07/02/2023 regarding the PNC and Nimbus pump and to remind them that the PNC should be removed tomorrow.  I was unable to leave a voicemail encouraging the patient to call with any problems or questions.      Merrill Escobar MD   Fellow  [facility] Anesthesiology and Acute Pain Medicine  [facility]        
No

## 2023-12-15 ENCOUNTER — CLINICAL SUPPORT (OUTPATIENT)
Dept: REHABILITATION | Facility: HOSPITAL | Age: 43
End: 2023-12-15
Payer: COMMERCIAL

## 2023-12-15 DIAGNOSIS — M25.561 ACUTE PAIN OF RIGHT KNEE: Primary | ICD-10-CM

## 2023-12-15 PROCEDURE — 97112 NEUROMUSCULAR REEDUCATION: CPT | Performed by: PHYSICAL THERAPIST

## 2023-12-15 PROCEDURE — 97530 THERAPEUTIC ACTIVITIES: CPT | Performed by: PHYSICAL THERAPIST

## 2023-12-20 ENCOUNTER — CLINICAL SUPPORT (OUTPATIENT)
Dept: REHABILITATION | Facility: HOSPITAL | Age: 43
End: 2023-12-20
Payer: COMMERCIAL

## 2023-12-20 DIAGNOSIS — M25.561 ACUTE PAIN OF RIGHT KNEE: Primary | ICD-10-CM

## 2023-12-20 PROCEDURE — 97112 NEUROMUSCULAR REEDUCATION: CPT | Mod: CQ

## 2023-12-20 PROCEDURE — 97530 THERAPEUTIC ACTIVITIES: CPT | Mod: CQ

## 2023-12-20 NOTE — PROGRESS NOTES
[facility]   Physical Therapy Treatment Note      Name: Tato Godinez  Clinic Number: [id number]    Therapy Diagnosis:   Encounter Diagnosis   Name Primary?    Acute pain of right knee Yes     Physician: Lamine Early MD    Visit Date: 12/20/2023  Physician Orders: PT Eval and Treat   Medical Diagnosis from Referral: 3.411A (ICD-10-CM) - Tear of medial collateral ligament of right knee, initial encounter  Evaluation Date: 6/30/2023  Authorization Period Expiration: 7/30/2023  Plan of Care Expiration: 9/30/2023  Progress Note Due: 8/10/2023  Visit # / Visits authorized: 35/40     Time In: 1334  Time Out: 1430  Total Billable Time: 50 minutes    FOTO IE 6/30: 29  FOTO 2: 8/22: 66  FOTO 11/3: 74  FOTO goal: 69    Precautions: Standard, post-op MCL repair protocol    Procedure by Sharath: 6/28/23   1. Right knee MCL repair with internal brace  2. Diagnostic right knee arthroscopy     The postop plan for the patient is to follow our MCL repair protocol.  Hinged knee brace locked in extension weightbear as tolerated for now.    Subjective     Pt reports: her knee is doing well. No pain, but she feels like it's still stiff at times.     She was compliant with home exercise program.  Response to previous treatment: mild-moderate quad soreness  Functional change: no significant change at this time    Pain: not verbalized/10  Location: right knee      Objective      DOS: 6/28/23  POD: 5 months, 22 days as of 12/20    Return to jog assessment 12/15/2023:  1st phase of [place] sports cord 1 minute - passed  10 minute uphill walk 7.5 incline  No pain with either, slight valgus on involved limb compared to contralateral side    Observation 10/31/2023:  Range of Motion   Knee   Right AROM/PROM  Left AROM/PROM    Flexion 125 degrees / 125 degrees  125 degrees / 125 degrees    Extension  +3 degrees / +3 degrees  +3 degrees / +3 degrees      Don Testing (10/31/2023):   Right  Left    Trial 1 40.6  "kg 42.8 kg   Trial 2 40.5 kg 44.5 kg   Trial 3 39.3 kg  43.2 kg    Average  40.1 kg  43.5 kg     12/6 objective strength testing:   Knee Extension iso at 60° (kg)                  Right Left                1 41.9 46.6                2 45.4 44.4                3 45.9 52.9                   Avg 44.4 48.0  7.4% strength deficit     Knee Flexion iso at 60° (kg)                  Right Left                1 26.6 30.5                2 26.5 29.4                3 27.9 29.9                Avg 27.0 29.9   8% strength deficit              Treatment     Tato received the treatments listed below:      therapeutic exercises to develop ROM, strength, flexibility, endurance for 00 minutes including:    NP:  LLLD heel prop 8# x 5'  DL leg press 150# 2 x 10  SL leg press 100# 2 x 10  LAQ matrix 35# 3 x 10  HS curl 25 lbs 3 x 10  Ext hinge strap stretch 5 x 20"    manual therapy techniques: joint mobilizations and/or soft tissue mobilizations were applied to the: knee for 00 minutes, including:     Assessment of knee   Patellar mobilizations all planes IV  Infrapatellar fat pad mobs   Gr III-IV posterior tibiofemoral glide with tibial internal rotation      neuromuscular re-education activities to improve: motor control, balance, muscle activation, proprioception, posture for 12 minutes. The following activities were included:    Quad sets 10 x 10"   LAQ matrix 25# 3x to fatigue bilaterally     NP:  Figure 4 SL bridge 2 x 8 naseem   Sball HS curl 3 x burn   S/L hip ABD 3 x 15 x 3"  Bridges YTB 2 x 10 x 10"  S/L clams YTB 15 x 10" naseem   DL Bridge into HS curl 3 x 12 reps   Sneaky lunges 2 x 10 x 5" holds   Standing clams GTB 3 x 6 naseem   Clam plank 10 x 10"  SL bridge 5" hold 3 x 10  Wall sit    therapeutic activities to improve functional performance for 38 minutes, including:    Upright bike lvl 6 x 8' for LE strengthening/cardiovascular endurance  Dynamic mobility on turf  Walking lunges x 1 turf lap  Waddles GTB x 2 turf laps  Return " to jogging phase 1 - [walk x 4', jog x 1'] x 3 rounds    NP:  DL squats hex bar +40# 4 x 8-10  Ecc SL leg press 120# 3 x 12  DL squats on ramp 20# 3 x 12-15  Split squats c single UE support 10# 3 x 8 naseem   Step downs 5-inch with mirror for visual feedback 3 x6-8 reps   SL Leg press 150 lbs 4 x 8      Patient Education and Home Exercises       Education provided:   - HEP     Written Home Exercises Provided: Continue prior HEP. Exercises were reviewed and Tato was able to demonstrate them prior to the end of the session.  Tato demonstrated good  understanding of the education provided. See EMR under Patient Instructions for exercises provided during therapy sessions.    Assessment     Tato did well today. No c/o knee pain during initiation of interval jogging program. Will assess response to today's tx and progress accordingly. No adverse effects reported p tx.     Tato is progressing well towards her goals.   Pt prognosis is Excellent.     Pt will continue to benefit from skilled outpatient physical therapy to address the deficits listed in the problem list box on initial evaluation, provide pt/family education and to maximize pt's level of independence in the home and community environment.     Pt's spiritual, cultural and educational needs considered and pt agreeable to plan of care and goals.     Anticipated barriers to physical therapy: None    GOALS: Short Term Goals:  6 weeks  1.Report decreased knee pain  < / =  3/10  to increase tolerance for exercise  2. Increase knee ROM to 125 degrees flexion in order to be able to perform ADLs without difficulty. - MET  3. Increase strength by 1/3 MMT grade in quadriceps  to increase tolerance for ADL and work activities.  4. Pt to tolerate HEP to improve ROM and independence with ADL's - MET     Long Term Goals: 24 weeks  1.Report decreased knee pain < / = 0/10  to increase tolerance for ADLs  2.Patient goal: Return to work in August  3.Increase strength to  >/= 4+/5 in quadriceps  to increase tolerance for ADL and work activities.  4. Pt will report at CJ level (20-40% impaired) on FOTO knee to demonstrate increase in LE function with every day tasks.      Plan     Outpatient Physical Therapy 2 times weekly for 10 weeks to include the following interventions: manual therapy, therapeutic exercise, therapeutic activities, and neuromuscular re-education.    Monik Hope, PTA

## 2023-12-27 ENCOUNTER — OFFICE VISIT (OUTPATIENT)
Dept: OBSTETRICS AND GYNECOLOGY | Facility: CLINIC | Age: 43
End: 2023-12-27
Payer: COMMERCIAL

## 2023-12-27 VITALS
WEIGHT: 195.75 LBS | DIASTOLIC BLOOD PRESSURE: 78 MMHG | HEIGHT: 62 IN | BODY MASS INDEX: 36.02 KG/M2 | SYSTOLIC BLOOD PRESSURE: 116 MMHG

## 2023-12-27 DIAGNOSIS — Z01.419 WOMEN'S ANNUAL ROUTINE GYNECOLOGICAL EXAMINATION: Primary | ICD-10-CM

## 2023-12-27 LAB
C TRACH DNA SPEC QL NAA+PROBE: NOT DETECTED
N GONORRHOEA DNA SPEC QL NAA+PROBE: NOT DETECTED

## 2023-12-27 PROCEDURE — 3074F SYST BP LT 130 MM HG: CPT | Mod: CPTII,S$GLB,, | Performed by: ADVANCED PRACTICE MIDWIFE

## 2023-12-27 PROCEDURE — 3074F PR MOST RECENT SYSTOLIC BLOOD PRESSURE < 130 MM HG: ICD-10-PCS | Mod: CPTII,S$GLB,, | Performed by: ADVANCED PRACTICE MIDWIFE

## 2023-12-27 PROCEDURE — 99396 PREV VISIT EST AGE 40-64: CPT | Mod: S$GLB,,, | Performed by: ADVANCED PRACTICE MIDWIFE

## 2023-12-27 PROCEDURE — 99999 PR PBB SHADOW E&M-EST. PATIENT-LVL III: CPT | Mod: PBBFAC,,, | Performed by: ADVANCED PRACTICE MIDWIFE

## 2023-12-27 PROCEDURE — 99999 PR PBB SHADOW E&M-EST. PATIENT-LVL III: ICD-10-PCS | Mod: PBBFAC,,, | Performed by: ADVANCED PRACTICE MIDWIFE

## 2023-12-27 PROCEDURE — 3078F DIAST BP <80 MM HG: CPT | Mod: CPTII,S$GLB,, | Performed by: ADVANCED PRACTICE MIDWIFE

## 2023-12-27 PROCEDURE — 3008F PR BODY MASS INDEX (BMI) DOCUMENTED: ICD-10-PCS | Mod: CPTII,S$GLB,, | Performed by: ADVANCED PRACTICE MIDWIFE

## 2023-12-27 PROCEDURE — 1159F MED LIST DOCD IN RCRD: CPT | Mod: CPTII,S$GLB,, | Performed by: ADVANCED PRACTICE MIDWIFE

## 2023-12-27 PROCEDURE — 87591 N.GONORRHOEAE DNA AMP PROB: CPT | Performed by: ADVANCED PRACTICE MIDWIFE

## 2023-12-27 PROCEDURE — 3008F BODY MASS INDEX DOCD: CPT | Mod: CPTII,S$GLB,, | Performed by: ADVANCED PRACTICE MIDWIFE

## 2023-12-27 PROCEDURE — 99396 PR PREVENTIVE VISIT,EST,40-64: ICD-10-PCS | Mod: S$GLB,,, | Performed by: ADVANCED PRACTICE MIDWIFE

## 2023-12-27 PROCEDURE — 1159F PR MEDICATION LIST DOCUMENTED IN MEDICAL RECORD: ICD-10-PCS | Mod: CPTII,S$GLB,, | Performed by: ADVANCED PRACTICE MIDWIFE

## 2023-12-27 PROCEDURE — 3078F PR MOST RECENT DIASTOLIC BLOOD PRESSURE < 80 MM HG: ICD-10-PCS | Mod: CPTII,S$GLB,, | Performed by: ADVANCED PRACTICE MIDWIFE

## 2023-12-27 RX ORDER — IBUPROFEN 600 MG/1
600 TABLET ORAL EVERY 8 HOURS PRN
COMMUNITY
Start: 2023-10-06

## 2023-12-27 NOTE — PROGRESS NOTES
HISTORY OF PRESENT ILLNESS:    Tato Godinez is a 43 y.o. female, , Patient's last menstrual period was 2023.,  presents for a routine annual exam . Pt has no complaints or concerns.Pt is current on her Pap. Pt had a normal mammogram 2023. Pt declines a BCM.     History reviewed. No pertinent past medical history.    Past Surgical History:   Procedure Laterality Date    ARTHROSCOPY OF KNEE Right 2023    Procedure: ARTHROSCOPY, KNEE;  Surgeon: Lamine Early MD;  Location: [facility] OR;  Service: Orthopedics;  Laterality: Right;     SECTION      HAND SURGERY      REPAIR OF LIGAMENT Right 2023    Procedure: REPAIR, LIGAMENT;  Surgeon: Lamine Early MD;  Location: [facility] OR;  Service: Orthopedics;  Laterality: Right;       MEDICATIONS AND ALLERGIES:      Current Outpatient Medications:     ibuprofen (ADVIL,MOTRIN) 800 MG tablet, Take 1 tablet (800 mg total) by mouth 3 (three) times daily as needed for Pain., Disp: 60 tablet, Rfl: 1    aspirin 81 MG Chew, Chew and swallow 1 tablet (81 mg total) by mouth 2 (two) times a day. for 14 days, Disp: 28 tablet, Rfl: 0    ibuprofen (ADVIL,MOTRIN) 600 MG tablet, Take 600 mg by mouth every 8 (eight) hours as needed., Disp: , Rfl:     ondansetron (ZOFRAN) 4 MG tablet, Take 1 tablet (4 mg total) by mouth every 8 (eight) hours as needed for Nausea. (Patient not taking: Reported on 2023), Disp: 20 tablet, Rfl: 0    oxyCODONE (ROXICODONE) 5 MG immediate release tablet, Take 1 tablet (5 mg total) by mouth every 6 (six) hours as needed for Pain. (Patient not taking: Reported on 2023), Disp: 28 tablet, Rfl: 0    traMADoL (ULTRAM) 50 mg tablet, Take 1 tablet (50 mg total) by mouth every 6 (six) hours as needed for Pain. (Patient not taking: Reported on 2023), Disp: 20 tablet, Rfl: 0    Review of patient's allergies indicates:  No Known Allergies    Family History   Problem Relation Age of Onset    Diabetes Maternal Grandmother      "Cancer Maternal Grandmother     Colon cancer Maternal Uncle     Breast cancer Neg Hx     Ovarian cancer Neg Hx        Social History     Socioeconomic History    Marital status:    Tobacco Use    Smoking status: Never    Smokeless tobacco: Never   Substance and Sexual Activity    Alcohol use: Yes     Comment: socially    Drug use: No    Sexual activity: Yes     Partners: Male     Birth control/protection: Condom       COMPREHENSIVE GYN HISTORY:  PAP History: Denies abnormal Paps.  Infection History: Denies STDs. Denies PID.  Benign History: Denies uterine fibroids. Denies ovarian cysts. Denies endometriosis. Denies other conditions.  Cancer History: Denies cervical cancer. Denies uterine cancer or hyperplasia. Denies ovarian cancer. Denies vulvar cancer or pre-cancer. Denies vaginal cancer or pre-cancer. Denies breast cancer. Denies colon cancer.  Sexual Activity History:  currently  sexually active  Menstrual History: Monthly  Dysmenorrhea History:None  Contraception:Condoms    ROS:  GENERAL: No weight changes. No swelling. No fatigue. No fever.  CARDIOVASCULAR: No chest pain. No shortness of breath. No leg cramps.   NEUROLOGICAL: No headaches. No vision changes.  BREASTS: No pain. No lumps. No discharge.  ABDOMEN: No pain. No nausea. No vomiting. No diarrhea. No constipation.  REPRODUCTIVE: No abnormal bleeding.   VULVA: No pain. No lesions. No itching.  VAGINA: No relaxation. No itching. No odor. No discharge. No lesions.  URINARY: No incontinence. No nocturia. No frequency. No dysuria.    /78   Ht 5' 2" (1.575 m)   Wt 88.8 kg (195 lb 12.3 oz)   LMP 12/20/2023   BMI 35.81 kg/m²     PE:  APPEARANCE: Well nourished, well developed, in no acute distress.  AFFECT: WNL, alert and oriented x 3. Interactive during exam  SKIN: No acne or hirsutism.  NECK: Neck symmetric, without masses or thyromegaly.  NODES: No inguinal, axillary or femoral lymph node enlargement.  CHEST: Good respiratory effort. "   ABDOMEN: Soft. No tenderness or masses. No hepatosplenomegaly. No hernias.  PELVIC: External female genitalia without lesions.  Female hair distribution. Adequate perineal body, Normal urethral meatus. Vagina moist and well rugated without lesions or discharge.  No significant cystocele or rectocele present. Cervix pink without lesions, discharge or tenderness. Uterus is 4-6 week size, regular, mobile and nontender. Adnexa without masses or tenderness.  EXTREMITIES: No edema    PROCEDURES:      DIAGNOSIS:  1. Gyn exam    LABS AND TESTS ORDERED:GC/ chlam    MEDICATIONS PRESCRIBED:None    COUNSELING:  The patient was counseled today on:  -A.C.S. Pap and pelvic exam guidelines, self breast exams and to follow up with her PCP for other health maintenance.    FOLLOW-UP - annual and Pap 12/2024

## 2023-12-28 ENCOUNTER — LAB VISIT (OUTPATIENT)
Dept: LAB | Facility: HOSPITAL | Age: 43
End: 2023-12-28
Payer: COMMERCIAL

## 2023-12-28 DIAGNOSIS — Z01.419 WOMEN'S ANNUAL ROUTINE GYNECOLOGICAL EXAMINATION: ICD-10-CM

## 2023-12-28 LAB
HAV IGM SERPL QL IA: NORMAL
HBV CORE IGM SERPL QL IA: NORMAL
HBV SURFACE AG SERPL QL IA: NORMAL
HCV AB SERPL QL IA: NORMAL
HIV 1+2 AB+HIV1 P24 AG SERPL QL IA: NORMAL

## 2023-12-28 PROCEDURE — 86592 SYPHILIS TEST NON-TREP QUAL: CPT | Performed by: ADVANCED PRACTICE MIDWIFE

## 2023-12-28 PROCEDURE — 80074 ACUTE HEPATITIS PANEL: CPT | Performed by: ADVANCED PRACTICE MIDWIFE

## 2023-12-28 PROCEDURE — 36415 COLL VENOUS BLD VENIPUNCTURE: CPT | Performed by: ADVANCED PRACTICE MIDWIFE

## 2023-12-28 PROCEDURE — 87389 HIV-1 AG W/HIV-1&-2 AB AG IA: CPT | Performed by: ADVANCED PRACTICE MIDWIFE

## 2023-12-29 LAB — RPR SER QL: NORMAL

## 2024-01-09 ENCOUNTER — PATIENT MESSAGE (OUTPATIENT)
Dept: REHABILITATION | Facility: HOSPITAL | Age: 44
End: 2024-01-09
Payer: COMMERCIAL

## 2024-01-17 ENCOUNTER — CLINICAL SUPPORT (OUTPATIENT)
Dept: REHABILITATION | Facility: HOSPITAL | Age: 44
End: 2024-01-17
Payer: COMMERCIAL

## 2024-01-17 DIAGNOSIS — M25.561 ACUTE PAIN OF RIGHT KNEE: Primary | ICD-10-CM

## 2024-01-17 PROCEDURE — 97110 THERAPEUTIC EXERCISES: CPT | Performed by: PHYSICAL THERAPIST

## 2024-01-17 PROCEDURE — 97112 NEUROMUSCULAR REEDUCATION: CPT | Performed by: PHYSICAL THERAPIST

## 2024-01-21 NOTE — PROGRESS NOTES
[facility]   Physical Therapy Treatment Note      Name: Tato Godinez  Clinic Number: [id number]    Therapy Diagnosis:   Encounter Diagnosis   Name Primary?    Acute pain of right knee Yes     Physician: Lamine Early MD    Visit Date: 1/17/2024  Physician Orders: PT Eval and Treat   Medical Diagnosis from Referral: 3.411A (ICD-10-CM) - Tear of medial collateral ligament of right knee, initial encounter  Evaluation Date: 6/30/2023  Authorization Period Expiration: 12/31/2024  Plan of Care Expiration: 12/31/2024  Progress Note Due: 3/31/2024  Visit # / Visits authorized: 1/20     Time In: 1330  Time Out: 1435  Total Billable Time: 50 minutes    FOTO IE 6/30: 29  FOTO 2: 8/22: 66  FOTO 11/3: 74  FOTO goal: 69    Precautions: Standard, post-op MCL repair protocol    Procedure by Sharath: 6/28/23   1. Right knee MCL repair with internal brace  2. Diagnostic right knee arthroscopy     The postop plan for the patient is to follow our MCL repair protocol.  Hinged knee brace locked in extension weightbear as tolerated for now.    Subjective     Pt reports: Has not been able to attend PT for the past few weeks because she has been sick. Not having a lot of knee pain but does have crepitus when she performs activities such as going down the stairs.    She was compliant with home exercise program.  Response to previous treatment: mild-moderate quad soreness  Functional change: no significant change at this time    Pain: not verbalized/10  Location: right knee      Objective      DOS: 6/28/23  POD: 6 months, 21 days as of 1/17/2024    Return to jog assessment 12/15/2023:  1st phase of [place] sports cord 1 minute - passed  10 minute uphill walk 7.5 incline  No pain with either, slight valgus on involved limb compared to contralateral side    Observation 10/31/2023:  Range of Motion   Knee   Right AROM/PROM  Left AROM/PROM    Flexion 125 degrees / 125 degrees  125 degrees / 125 degrees   "  Extension  +3 degrees / +3 degrees  +3 degrees / +3 degrees      Don Testing (10/31/2023):   Right  Left    Trial 1 40.6 kg 42.8 kg   Trial 2 40.5 kg 44.5 kg   Trial 3 39.3 kg  43.2 kg    Average  40.1 kg  43.5 kg     12/6 objective strength testing:   Knee Extension iso at 60° (kg)                  Right Left                1 41.9 46.6                2 45.4 44.4                3 45.9 52.9                   Avg 44.4 48.0  7.4% strength deficit     Knee Flexion iso at 60° (kg)                  Right Left                1 26.6 30.5                2 26.5 29.4                3 27.9 29.9                Avg 27.0 29.9   8% strength deficit              Treatment     Tato received the treatments listed below:      therapeutic exercises to develop ROM, strength, flexibility, endurance for 25 minutes including:  Stationary cycle 8 minutes  SL leg press 140# 4 x 8  LAQ matrix 35# 3 x 10      NP  LLLD heel prop 8# x 5'  DL leg press 150# 2 x 10  HS curl 25 lbs 3 x 10  Ext hinge strap stretch 5 x 20"    manual therapy techniques: joint mobilizations and/or soft tissue mobilizations were applied to the: knee for 00 minutes, including:     Assessment of knee   Patellar mobilizations all planes IV  Infrapatellar fat pad mobs   Gr III-IV posterior tibiofemoral glide with tibial internal rotation      neuromuscular re-education activities to improve: motor control, balance, muscle activation, proprioception, posture for 25 minutes. The following activities were included:    Quad sets 10 x 10"   Supine HS bridge  SL clamshell 10 x 10"    NP:  Figure 4 SL bridge 2 x 8 naseem   Sball HS curl 3 x burn   S/L hip ABD 3 x 15 x 3"  Bridges YTB 2 x 10 x 10"  S/L clams YTB 15 x 10" naseem   DL Bridge into HS curl 3 x 12 reps   Sneaky lunges 2 x 10 x 5" holds   Standing clams GTB 3 x 6 naseem   Clam plank 10 x 10"  SL bridge 5" hold 3 x 10  Wall sit    therapeutic activities to improve functional performance for 00 minutes, including:    Upright " bike lvl 6 x 8' for LE strengthening/cardiovascular endurance  Dynamic mobility on turf  Walking lunges x 1 turf lap  Waddles GTB x 2 turf laps    NP:  DL squats hex bar +40# 4 x 8-10  Ecc SL leg press 120# 3 x 12  DL squats on ramp 20# 3 x 12-15  Split squats c single UE support 10# 3 x 8 naseem   Step downs 5-inch with mirror for visual feedback 3 x6-8 reps   SL Leg press 150 lbs 4 x 8      Patient Education and Home Exercises       Education provided:   - HEP     Written Home Exercises Provided: Continue prior HEP. Exercises were reviewed and Tato was able to demonstrate them prior to the end of the session.  Tato demonstrated good  understanding of the education provided. See EMR under Patient Instructions for exercises provided during therapy sessions.    Assessment     Returns to PT following a multi week absence due to the holidays and illness. Has been doing well, not regularly exercising. Assessment shows need for continued posterior chain strength and progressions with functional loading such as step downs. Doing well. Should be fine with 1 session per week.    Tato is progressing well towards her goals.   Pt prognosis is Excellent.     Pt will continue to benefit from skilled outpatient physical therapy to address the deficits listed in the problem list box on initial evaluation, provide pt/family education and to maximize pt's level of independence in the home and community environment.     Pt's spiritual, cultural and educational needs considered and pt agreeable to plan of care and goals.     Anticipated barriers to physical therapy: None    GOALS: Short Term Goals:  6 weeks  1.Report decreased knee pain  < / =  3/10  to increase tolerance for exercise  2. Increase knee ROM to 125 degrees flexion in order to be able to perform ADLs without difficulty. - MET  3. Increase strength by 1/3 MMT grade in quadriceps  to increase tolerance for ADL and work activities.  4. Pt to tolerate HEP to improve  ROM and independence with ADL's - MET     Long Term Goals: 24 weeks  1.Report decreased knee pain < / = 0/10  to increase tolerance for ADLs  2.Patient goal: Return to work in August  3.Increase strength to >/= 4+/5 in quadriceps  to increase tolerance for ADL and work activities.  4. Pt will report at CJ level (20-40% impaired) on FOTO knee to demonstrate increase in LE function with every day tasks.      Plan     Outpatient Physical Therapy 2 times weekly for 10 weeks to include the following interventions: manual therapy, therapeutic exercise, therapeutic activities, and neuromuscular re-education.    Gal Bran, PT, DPT, OCS

## 2024-01-31 ENCOUNTER — CLINICAL SUPPORT (OUTPATIENT)
Dept: REHABILITATION | Facility: HOSPITAL | Age: 44
End: 2024-01-31
Payer: COMMERCIAL

## 2024-01-31 DIAGNOSIS — M25.561 ACUTE PAIN OF RIGHT KNEE: Primary | ICD-10-CM

## 2024-01-31 PROCEDURE — 97112 NEUROMUSCULAR REEDUCATION: CPT | Performed by: PHYSICAL THERAPIST

## 2024-01-31 PROCEDURE — 97110 THERAPEUTIC EXERCISES: CPT | Performed by: PHYSICAL THERAPIST

## 2024-01-31 NOTE — PROGRESS NOTES
[facility]   Physical Therapy Treatment Note      Name: Tato Godinez  Clinic Number: [id number]    Therapy Diagnosis:   Encounter Diagnosis   Name Primary?    Acute pain of right knee Yes     Physician: Lamine Early MD    Visit Date: 1/31/2024  Physician Orders: PT Eval and Treat   Medical Diagnosis from Referral: 3.411A (ICD-10-CM) - Tear of medial collateral ligament of right knee, initial encounter  Evaluation Date: 6/30/2023  Authorization Period Expiration: 12/31/2024  Plan of Care Expiration: 12/31/2024  Progress Note Due: 3/31/2024  Visit # / Visits authorized: 2/20     Time In: 1336  Time Out: 1440  Total Billable Time: 50 minutes    FOTO IE 6/30: 29  FOTO 2: 8/22: 66  FOTO 11/3: 74  FOTO goal: 69    Precautions: Standard, post-op MCL repair protocol    Procedure by Sharath: 6/28/23   1. Right knee MCL repair with internal brace  2. Diagnostic right knee arthroscopy     The postop plan for the patient is to follow our MCL repair protocol.  Hinged knee brace locked in extension weightbear as tolerated for now.    Subjective     Pt reports: Minimal knee pain. Gradually progressing. No difficulty with stairs.     She was compliant with home exercise program.  Response to previous treatment: mild-moderate quad soreness  Functional change: no significant change at this time    Pain: not verbalized/10  Location: right knee      Objective      DOS: 6/28/23  POD: 6 months, 21 days as of 1/17/2024    Return to jog assessment 12/15/2023:  1st phase of [place] sports cord 1 minute - passed  10 minute uphill walk 7.5 incline  No pain with either, slight valgus on involved limb compared to contralateral side    Observation 10/31/2023:  Range of Motion   Knee   Right AROM/PROM  Left AROM/PROM    Flexion 125 degrees / 125 degrees  125 degrees / 125 degrees    Extension  +3 degrees / +3 degrees  +3 degrees / +3 degrees      Don Testing (10/31/2023):   Right  Left    Trial 1 40.6 kg  "42.8 kg   Trial 2 40.5 kg 44.5 kg   Trial 3 39.3 kg  43.2 kg    Average  40.1 kg  43.5 kg     12/6 objective strength testing:   Knee Extension iso at 60° (kg)                  Right Left                1 41.9 46.6                2 45.4 44.4                3 45.9 52.9                   Avg 44.4 48.0  7.4% strength deficit     Knee Flexion iso at 60° (kg)                  Right Left                1 26.6 30.5                2 26.5 29.4                3 27.9 29.9                Avg 27.0 29.9   8% strength deficit              Treatment     Tato received the treatments listed below:      therapeutic exercises to develop ROM, strength, flexibility, endurance for 25 minutes including:  Stationary cycle 8 minutes  SL leg press 140# 4 x 8  LAQ matrix 35# 3 x 10  HS matrix 25 lbs 3 x 10      NP  LLLD heel prop 8# x 5'  DL leg press 150# 2 x 10  HS curl 25 lbs 3 x 10  Ext hinge strap stretch 5 x 20"    manual therapy techniques: joint mobilizations and/or soft tissue mobilizations were applied to the: knee for 00 minutes, including:     Assessment of knee   Patellar mobilizations all planes IV  Infrapatellar fat pad mobs   Gr III-IV posterior tibiofemoral glide with tibial internal rotation      neuromuscular re-education activities to improve: motor control, balance, muscle activation, proprioception, posture for 25 minutes. The following activities were included:    Quad sets 10 x 10"   Supine HS bridge  SL clamshell 10 x 10"    NP:  Figure 4 SL bridge 2 x 8 naseem   Sball HS curl 3 x burn   S/L hip ABD 3 x 15 x 3"  Bridges YTB 2 x 10 x 10"  S/L clams YTB 15 x 10" naseem   DL Bridge into HS curl 3 x 12 reps   Sneaky lunges 2 x 10 x 5" holds   Standing clams GTB 3 x 6 naseem   Clam plank 10 x 10"  SL bridge 5" hold 3 x 10  Wall sit    therapeutic activities to improve functional performance for 00 minutes, including:    Upright bike lvl 6 x 8' for LE strengthening/cardiovascular endurance  Dynamic mobility on turf  Walking lunges " x 1 turf lap  Waddles GTB x 2 turf laps    NP:  DL squats hex bar +40# 4 x 8-10  Ecc SL leg press 120# 3 x 12  DL squats on ramp 20# 3 x 12-15  Split squats c single UE support 10# 3 x 8 naseem   Step downs 5-inch with mirror for visual feedback 3 x6-8 reps   SL Leg press 150 lbs 4 x 8      Patient Education and Home Exercises       Education provided:   - HEP     Written Home Exercises Provided: Continue prior HEP. Exercises were reviewed and Tato was able to demonstrate them prior to the end of the session.  Tato demonstrated good  understanding of the education provided. See EMR under Patient Instructions for exercises provided during therapy sessions.    Assessment   Strength test next session. Doing well. Should be ready to transition to HEP soon.     Tato is progressing well towards her goals.   Pt prognosis is Excellent.     Pt will continue to benefit from skilled outpatient physical therapy to address the deficits listed in the problem list box on initial evaluation, provide pt/family education and to maximize pt's level of independence in the home and community environment.     Pt's spiritual, cultural and educational needs considered and pt agreeable to plan of care and goals.     Anticipated barriers to physical therapy: None    GOALS: Short Term Goals:  6 weeks  1.Report decreased knee pain  < / =  3/10  to increase tolerance for exercise  2. Increase knee ROM to 125 degrees flexion in order to be able to perform ADLs without difficulty. - MET  3. Increase strength by 1/3 MMT grade in quadriceps  to increase tolerance for ADL and work activities.  4. Pt to tolerate HEP to improve ROM and independence with ADL's - MET     Long Term Goals: 24 weeks  1.Report decreased knee pain < / = 0/10  to increase tolerance for ADLs  2.Patient goal: Return to work in August  3.Increase strength to >/= 4+/5 in quadriceps  to increase tolerance for ADL and work activities.  4. Pt will report at CJ level (20-40%  impaired) on FOTO knee to demonstrate increase in LE function with every day tasks.      Plan     Outpatient Physical Therapy 2 times weekly for 10 weeks to include the following interventions: manual therapy, therapeutic exercise, therapeutic activities, and neuromuscular re-education.    Gal Bran, PT, DPT, OCS

## 2024-02-07 ENCOUNTER — CLINICAL SUPPORT (OUTPATIENT)
Dept: REHABILITATION | Facility: HOSPITAL | Age: 44
End: 2024-02-07
Payer: COMMERCIAL

## 2024-02-07 DIAGNOSIS — M25.561 ACUTE PAIN OF RIGHT KNEE: Primary | ICD-10-CM

## 2024-02-07 PROCEDURE — 97110 THERAPEUTIC EXERCISES: CPT | Mod: CQ

## 2024-02-07 PROCEDURE — 97530 THERAPEUTIC ACTIVITIES: CPT | Mod: CQ

## 2024-02-07 PROCEDURE — 97112 NEUROMUSCULAR REEDUCATION: CPT | Mod: CQ

## 2024-02-07 NOTE — PROGRESS NOTES
[facility]   Physical Therapy Treatment Note      Name: Tato Godinez  Clinic Number: [id number]    Therapy Diagnosis:   Encounter Diagnosis   Name Primary?    Acute pain of right knee Yes     Physician: Lamine Early MD    Visit Date: 2/7/2024  Physician Orders: PT Eval and Treat   Medical Diagnosis from Referral: 3.411A (ICD-10-CM) - Tear of medial collateral ligament of right knee, initial encounter  Evaluation Date: 6/30/2023  Authorization Period Expiration: 12/31/2024  Plan of Care Expiration: 12/31/2024  Progress Note Due: 3/31/2024  Visit # / Visits authorized: 3/20     Time In: 1333  Time Out: 1446  Total Billable Time: 64 minutes    FOTO IE 6/30: 29  FOTO 2: 8/22: 66  FOTO 11/3: 74  FOTO goal: 69    Precautions: Standard, post-op MCL repair protocol    Procedure by Sharath: 6/28/23   1. Right knee MCL repair with internal brace  2. Diagnostic right knee arthroscopy     The postop plan for the patient is to follow our MCL repair protocol.  Hinged knee brace locked in extension weightbear as tolerated for now.    Subjective     Pt reports: no c/o knee pain upon entry.    She was compliant with home exercise program.  Response to previous treatment: mild-moderate quad soreness  Functional change: no significant change at this time    Pain: not verbalized/10  Location: right knee      Objective      DOS: 6/28/23  POD: 7 months, 10 days as of 2/7    Return to jog assessment 12/15/2023:  1st phase of [place] sports cord 1 minute - passed  10 minute uphill walk 7.5 incline  No pain with either, slight valgus on involved limb compared to contralateral side    Observation 10/31/2023:  Range of Motion   Knee   Right AROM/PROM  Left AROM/PROM    Flexion 125 degrees / 125 degrees  125 degrees / 125 degrees    Extension  +3 degrees / +3 degrees  +3 degrees / +3 degrees      Don Testing (10/31/2023):   Right  Left    Trial 1 40.6 kg 42.8 kg   Trial 2 40.5 kg 44.5 kg   Trial 3 39.3  "kg  43.2 kg    Average  40.1 kg  43.5 kg     12/6 objective strength testing:   Knee Extension iso at 60° (kg)                  Right Left                1 41.9 46.6                2 45.4 44.4                3 45.9 52.9                   Avg 44.4 48.0  7.4% strength deficit     Knee Flexion iso at 60° (kg)                  Right Left                1 26.6 30.5                2 26.5 29.4                3 27.9 29.9                Avg 27.0 29.9   8% strength deficit            2/7 objective strength testing:   Knee Extension iso at 60° (kg)                  Right Left                1 43.5 47.8                2 41.9 45.4                3 43.1 43.6                  Avg 42.8 45.6  6.1% strength deficit    Knee Flexion iso at 60° (kg)                  Right Left                1 28.5 27.5                2 26.3 27.1                3 26.9 26                Avg 27.2 26.9   1.1% stronger    Treatment     Tato received the treatments listed below:      therapeutic exercises to develop ROM, strength, flexibility, endurance for 15 minutes including:    LAQ matrix 35# 3x to fatigue naseem   HS matrix 35# 3x to fatigue    NP  LLLD heel prop 8# x 5'  DL leg press 150# 2 x 10  HS curl 25 lbs 3 x 10  Ext hinge strap stretch 5 x 20"  SL leg press 140# 4 x 8    manual therapy techniques: joint mobilizations and/or soft tissue mobilizations were applied to the: knee for 02 minutes, including:     Assessment of knee   Patellar mobilizations all planes IV  Infrapatellar fat pad mobs   Gr III-IV posterior tibiofemoral glide with tibial internal rotation      neuromuscular re-education activities to improve: motor control, balance, muscle activation, proprioception, posture for 15 minutes. The following activities were included:    Quad sets 10 x 10"   Incline SLR 2.5# 3 x 8 x 3"   Figure 4 SL bridges 2 x 8 naseem     NP:  Sball HS curl 3 x burn   S/L clams YTB 15 x 10" naseem   Sneaky lunges 2 x 10 x 5" holds   Standing clams GTB 3 x 6 naseem " "  Clam plank 10 x 10"    therapeutic activities to improve functional performance for 34 minutes, including:    Upright bike lvl 6 x 8' for LE strengthening/cardiovascular endurance  Waddles GTB x 1 turf lap  Objective strength testing  [place] split squats c dowel support 2 x 10 naseem     NP:  DL squats hex bar +40# 4 x 8-10  Ecc SL leg press 120# 3 x 12  DL squats on ramp 20# 3 x 12-15  Split squats c single UE support 10# 3 x 8 naseem   Step downs 5-inch with mirror for visual feedback 3 x6-8 reps   SL Leg press 150 lbs 4 x 8  Dynamic mobility on turf  Walking lunges x 1 turf lap      Patient Education and Home Exercises       Education provided:   - HEP     Written Home Exercises Provided: Continue prior HEP. Exercises were reviewed and Tato was able to demonstrate them prior to the end of the session.  Tato demonstrated good  understanding of the education provided. See EMR under Patient Instructions for exercises provided during therapy sessions.    Assessment     Tato did well today. Her objective strength testing shows less force output compared to 12/6 testing, but less of a strength deficit for both quad and HS. She was able to progress [place] quad and HS load with good tolerance. Pt was challenged by initiation of Armenian split squats at end of tx. Will assess response to today's tx and progress as appropriate. No adverse effects reported p tx.     Tato is progressing well towards her goals.   Pt prognosis is Excellent.     Pt will continue to benefit from skilled outpatient physical therapy to address the deficits listed in the problem list box on initial evaluation, provide pt/family education and to maximize pt's level of independence in the home and community environment.     Pt's spiritual, cultural and educational needs considered and pt agreeable to plan of care and goals.     Anticipated barriers to physical therapy: None    GOALS: Short Term Goals:  6 weeks  1.Report decreased knee pain  " < / =  3/10  to increase tolerance for exercise  2. Increase knee ROM to 125 degrees flexion in order to be able to perform ADLs without difficulty. - MET  3. Increase strength by 1/3 MMT grade in quadriceps  to increase tolerance for ADL and work activities.  4. Pt to tolerate HEP to improve ROM and independence with ADL's - MET     Long Term Goals: 24 weeks  1.Report decreased knee pain < / = 0/10  to increase tolerance for ADLs  2.Patient goal: Return to work in August  3.Increase strength to >/= 4+/5 in quadriceps  to increase tolerance for ADL and work activities.  4. Pt will report at CJ level (20-40% impaired) on FOTO knee to demonstrate increase in LE function with every day tasks.      Plan     Outpatient Physical Therapy 2 times weekly for 10 weeks to include the following interventions: manual therapy, therapeutic exercise, therapeutic activities, and neuromuscular re-education.    Monik Hope, PTA

## 2024-02-21 ENCOUNTER — CLINICAL SUPPORT (OUTPATIENT)
Dept: REHABILITATION | Facility: HOSPITAL | Age: 44
End: 2024-02-21
Payer: COMMERCIAL

## 2024-02-21 DIAGNOSIS — M25.561 ACUTE PAIN OF RIGHT KNEE: Primary | ICD-10-CM

## 2024-02-21 PROCEDURE — 97530 THERAPEUTIC ACTIVITIES: CPT | Mod: CQ

## 2024-02-21 PROCEDURE — 97112 NEUROMUSCULAR REEDUCATION: CPT | Mod: CQ

## 2024-02-21 PROCEDURE — 97110 THERAPEUTIC EXERCISES: CPT | Mod: CQ

## 2024-02-21 NOTE — PROGRESS NOTES
[facility]   Physical Therapy Treatment Note      Name: Tato Godinez  Clinic Number: [id number]    Therapy Diagnosis:   Encounter Diagnosis   Name Primary?    Acute pain of right knee Yes     Physician: Lamine Early MD    Visit Date: 2/21/2024  Physician Orders: PT Eval and Treat   Medical Diagnosis from Referral: 3.411A (ICD-10-CM) - Tear of medial collateral ligament of right knee, initial encounter  Evaluation Date: 6/30/2023  Authorization Period Expiration: 12/31/2024  Plan of Care Expiration: 12/31/2024  Progress Note Due: 3/31/2024  Visit # / Visits authorized: 4/20     Time In: 1200  Time Out: 1306  Total Billable Time: 52 minutes    FOTO IE 6/30: 29  FOTO 2: 8/22: 66  FOTO 11/3: 74  FOTO goal: 69    Precautions: Standard, post-op MCL repair protocol    Procedure by Sharath: 6/28/23   1. Right knee MCL repair with internal brace  2. Diagnostic right knee arthroscopy     The postop plan for the patient is to follow our MCL repair protocol.  Hinged knee brace locked in extension weightbear as tolerated for now.    Subjective     Pt reports: no c/o knee pain upon entry. She had to miss last week because her daughter was sick.     She was compliant with home exercise program.  Response to previous treatment: mild-moderate quad soreness  Functional change: no significant change at this time    Pain: not verbalized/10  Location: right knee      Objective      DOS: 6/28/23  POD: 7 months, 24 days as of 2/21    Return to jog assessment 12/15/2023:  1st phase of [place] sports cord 1 minute - passed  10 minute uphill walk 7.5 incline  No pain with either, slight valgus on involved limb compared to contralateral side    Observation 10/31/2023:  Range of Motion   Knee   Right AROM/PROM  Left AROM/PROM    Flexion 125 degrees / 125 degrees  125 degrees / 125 degrees    Extension  +3 degrees / +3 degrees  +3 degrees / +3 degrees      Don Testing (10/31/2023):   Right  Left    Trial  "1 40.6 kg 42.8 kg   Trial 2 40.5 kg 44.5 kg   Trial 3 39.3 kg  43.2 kg    Average  40.1 kg  43.5 kg     12/6 objective strength testing:   Knee Extension iso at 60° (kg)                  Right Left                1 41.9 46.6                2 45.4 44.4                3 45.9 52.9                   Avg 44.4 48.0  7.4% strength deficit     Knee Flexion iso at 60° (kg)                  Right Left                1 26.6 30.5                2 26.5 29.4                3 27.9 29.9                Avg 27.0 29.9   8% strength deficit            2/7 objective strength testing:   Knee Extension iso at 60° (kg)                  Right Left                1 43.5 47.8                2 41.9 45.4                3 43.1 43.6                  Avg 42.8 45.6  6.1% strength deficit    Knee Flexion iso at 60° (kg)                  Right Left                1 28.5 27.5                2 26.3 27.1                3 26.9 26                Avg 27.2 26.9   1.1% stronger    Treatment     Tato received the treatments listed below:      therapeutic exercises to develop ROM, strength, flexibility, endurance for 16 minutes including:    LAQ matrix 35# 3x to fatigue naseem   HS matrix 35# 3x to fatigue    NP  LLLD heel prop 8# x 5'  DL leg press 150# 2 x 10  HS curl 25 lbs 3 x 10  Ext hinge strap stretch 5 x 20"  SL leg press 140# 4 x 8    manual therapy techniques: joint mobilizations and/or soft tissue mobilizations were applied to the: knee for 02 minutes, including:     Assessment of knee   Patellar mobilizations all planes IV  Infrapatellar fat pad mobs   Gr III-IV posterior tibiofemoral glide with tibial internal rotation      neuromuscular re-education activities to improve: motor control, balance, muscle activation, proprioception, posture for 12 minutes. The following activities were included:    Quad sets 10 x 10"   Incline SLR 3# 3 x 8 x 5"     NP:  Sball HS curl 3 x burn   S/L clams YTB 15 x 10" naseem   Sneaky lunges 2 x 10 x 5" holds   Standing " "clams GTB 3 x 6 naseem   Clam plank 10 x 10"  Figure 4 SL bridges 2 x 8 naseem     therapeutic activities to improve functional performance for 24 minutes, including:    Upright bike lvl 6 x 8' for LE strengthening/cardiovascular endurance  Waddles GTB x 1 turf lap  Split squats c single UE support 26#kb 3 x 8 naseem     NP:  DL squats hex bar +40# 4 x 8-10  Ecc SL leg press 120# 3 x 12  DL squats on ramp 20# 3 x 12-15  Step downs 5-inch with mirror for visual feedback 3 x6-8 reps   SL Leg press 150 lbs 4 x 8  Dynamic mobility on turf  Walking lunges x 1 turf lap  [place]bek split squats c dowel support 2 x 10 naseem       Patient Education and Home Exercises       Education provided:   - HEP     Written Home Exercises Provided: Continue prior HEP. Exercises were reviewed and Tato was able to demonstrate them prior to the end of the session.  Tato demonstrated good  understanding of the education provided. See EMR under Patient Instructions for exercises provided during therapy sessions.    Assessment     Tato did well today. She was challenged by progressed split squats at end of tx. Will assess response to today's tx and progress as appropriate. No adverse effects reported p tx.     Tato is progressing well towards her goals.   Pt prognosis is Excellent.     Pt will continue to benefit from skilled outpatient physical therapy to address the deficits listed in the problem list box on initial evaluation, provide pt/family education and to maximize pt's level of independence in the home and community environment.     Pt's spiritual, cultural and educational needs considered and pt agreeable to plan of care and goals.     Anticipated barriers to physical therapy: None    GOALS: Short Term Goals:  6 weeks  1.Report decreased knee pain  < / =  3/10  to increase tolerance for exercise  2. Increase knee ROM to 125 degrees flexion in order to be able to perform ADLs without difficulty. - MET  3. Increase strength by 1/3 MMT " grade in quadriceps  to increase tolerance for ADL and work activities.  4. Pt to tolerate HEP to improve ROM and independence with ADL's - MET     Long Term Goals: 24 weeks  1.Report decreased knee pain < / = 0/10  to increase tolerance for ADLs  2.Patient goal: Return to work in August  3.Increase strength to >/= 4+/5 in quadriceps  to increase tolerance for ADL and work activities.  4. Pt will report at CJ level (20-40% impaired) on FOTO knee to demonstrate increase in LE function with every day tasks.      Plan     Outpatient Physical Therapy 2 times weekly for 10 weeks to include the following interventions: manual therapy, therapeutic exercise, therapeutic activities, and neuromuscular re-education.    Monik Hope, PTA

## 2024-02-28 ENCOUNTER — CLINICAL SUPPORT (OUTPATIENT)
Dept: REHABILITATION | Facility: HOSPITAL | Age: 44
End: 2024-02-28
Payer: COMMERCIAL

## 2024-02-28 DIAGNOSIS — M25.561 ACUTE PAIN OF RIGHT KNEE: Primary | ICD-10-CM

## 2024-02-28 PROCEDURE — 97112 NEUROMUSCULAR REEDUCATION: CPT | Performed by: PHYSICAL THERAPIST

## 2024-02-28 PROCEDURE — 97530 THERAPEUTIC ACTIVITIES: CPT | Performed by: PHYSICAL THERAPIST

## 2024-02-28 PROCEDURE — 97110 THERAPEUTIC EXERCISES: CPT | Performed by: PHYSICAL THERAPIST

## 2024-02-28 NOTE — PROGRESS NOTES
[facility]   Physical Therapy Treatment Note      Name: Tato Godinez  Clinic Number: [id number]    Therapy Diagnosis:   Encounter Diagnosis   Name Primary?    Acute pain of right knee Yes     Physician: Lamine Early MD    Visit Date: 2/28/2024  Physician Orders: PT Eval and Treat   Medical Diagnosis from Referral: 3.411A (ICD-10-CM) - Tear of medial collateral ligament of right knee, initial encounter  Evaluation Date: 6/30/2023  Authorization Period Expiration: 12/31/2024  Plan of Care Expiration: 12/31/2024  Progress Note Due: 3/31/2024  Visit # / Visits authorized: 4/20     Time In: 1228  Time Out: 1331  Total Billable Time: 46 minutes    FOTO IE 6/30: 29  FOTO 2: 8/22: 66  FOTO 11/3: 74  FOTO goal: 69    Precautions: Standard, post-op MCL repair protocol    Procedure by Sharath: 6/28/23   1. Right knee MCL repair with internal brace  2. Diagnostic right knee arthroscopy     The postop plan for the patient is to follow our MCL repair protocol.  Hinged knee brace locked in extension weightbear as tolerated for now.    Subjective     Pt reports: No pain and feels like she is ready to transition to a home exercise program.     She was compliant with home exercise program.  Response to previous treatment: mild-moderate quad soreness  Functional change: no significant change at this time    Pain: not verbalized/10  Location: right knee      Objective      DOS: 6/28/23  POD: 7 months, 24 days as of 2/21    Return to jog assessment 12/15/2023:  1st phase of [place] sports cord 1 minute - passed  10 minute uphill walk 7.5 incline  No pain with either, slight valgus on involved limb compared to contralateral side    Observation 10/31/2023:  Range of Motion   Knee   Right AROM/PROM  Left AROM/PROM    Flexion 125 degrees / 125 degrees  125 degrees / 125 degrees    Extension  +3 degrees / +3 degrees  +3 degrees / +3 degrees      Don Testing (10/31/2023):   Right  Left    Trial 1 40.6  "kg 42.8 kg   Trial 2 40.5 kg 44.5 kg   Trial 3 39.3 kg  43.2 kg    Average  40.1 kg  43.5 kg     12/6 objective strength testing:   Knee Extension iso at 60° (kg)                  Right Left                1 41.9 46.6                2 45.4 44.4                3 45.9 52.9                   Avg 44.4 48.0  7.4% strength deficit     Knee Flexion iso at 60° (kg)                  Right Left                1 26.6 30.5                2 26.5 29.4                3 27.9 29.9                Avg 27.0 29.9   8% strength deficit            2/7 objective strength testing:   Knee Extension iso at 60° (kg)                  Right Left                1 43.5 47.8                2 41.9 45.4                3 43.1 43.6                  Avg 42.8 45.6  6.1% strength deficit    Knee Flexion iso at 60° (kg)                  Right Left                1 28.5 27.5                2 26.3 27.1                3 26.9 26                Avg 27.2 26.9   1.1% stronger    Treatment     Tato received the treatments listed below:      therapeutic exercises to develop ROM, strength, flexibility, endurance for 10 minutes including:    LAQ matrix 35# 3x to fatigue naseem   HS matrix 35# 3x to fatigue    NP  LLLD heel prop 8# x 5'  DL leg press 150# 2 x 10  HS curl 25 lbs 3 x 10  Ext hinge strap stretch 5 x 20"  SL leg press 140# 4 x 8    manual therapy techniques: joint mobilizations and/or soft tissue mobilizations were applied to the: knee for 02 minutes, including:     Assessment of knee   Patellar mobilizations all planes IV  Infrapatellar fat pad mobs   Gr III-IV posterior tibiofemoral glide with tibial internal rotation      neuromuscular re-education activities to improve: motor control, balance, muscle activation, proprioception, posture for 12 minutes. The following activities were included:  Bridge 10 x 10"  Clam plank 10 x 10"      NP:  Sball HS curl 3 x burn   S/L clams YTB 15 x 10" naseem   Sneaky lunges 2 x 10 x 5" holds   Standing clams GTB 3 x 6 " "naseem   Clam plank 10 x 10"  Figure 4 SL bridges 2 x 8 naseem     therapeutic activities to improve functional performance for 24 minutes, including:    Upright bike lvl 6 x 0' for LE strengthening/cardiovascular endurance  Waddles GTB x 1 turf lap  DL leg press     NP:  DL squats hex bar +40# 4 x 8-10  Ecc SL leg press 120# 3 x 12  DL squats on ramp 20# 3 x 12-15  Step downs 5-inch with mirror for visual feedback 3 x6-8 reps   SL Leg press 150 lbs 4 x 8  Dynamic mobility on turf  Walking lunges x 1 turf lap  Bengali split squats c dowel support 2 x 10 naseem       Patient Education and Home Exercises       Education provided:   - HEP     Written Home Exercises Provided: Continue prior HEP. Exercises were reviewed and Tato was able to demonstrate them prior to the end of the session.  Tato demonstrated good  understanding of the education provided. See EMR under Patient Instructions for exercises provided during therapy sessions.    Assessment     Progressing very well and is appropriate for DC to HEP at this time.     Tato is progressing well towards her goals.   Pt prognosis is Excellent.     Pt will continue to benefit from skilled outpatient physical therapy to address the deficits listed in the problem list box on initial evaluation, provide pt/family education and to maximize pt's level of independence in the home and community environment.     Pt's spiritual, cultural and educational needs considered and pt agreeable to plan of care and goals.     Anticipated barriers to physical therapy: None    GOALS: Short Term Goals:  6 weeks  1.Report decreased knee pain  < / =  3/10  to increase tolerance for exercise  2. Increase knee ROM to 125 degrees flexion in order to be able to perform ADLs without difficulty. - MET  3. Increase strength by 1/3 MMT grade in quadriceps  to increase tolerance for ADL and work activities.  4. Pt to tolerate HEP to improve ROM and independence with ADL's - MET     Long Term Goals: " 24 weeks  1.Report decreased knee pain < / = 0/10  to increase tolerance for ADLs  2.Patient goal: Return to work in August  3.Increase strength to >/= 4+/5 in quadriceps  to increase tolerance for ADL and work activities.  4. Pt will report at CJ level (20-40% impaired) on FOTO knee to demonstrate increase in LE function with every day tasks.      Plan     Discharge from PT with HEP.     Gal Bran, PT

## 2024-05-09 ENCOUNTER — HOSPITAL ENCOUNTER (OUTPATIENT)
Dept: RADIOLOGY | Facility: HOSPITAL | Age: 44
Discharge: HOME | End: 2024-05-09
Attending: ORTHOPAEDIC SURGERY
Payer: COMMERCIAL

## 2024-05-09 ENCOUNTER — OFFICE VISIT (OUTPATIENT)
Dept: SPORTS MEDICINE | Facility: CLINIC | Age: 44
End: 2024-05-09
Payer: COMMERCIAL

## 2024-05-09 VITALS
HEIGHT: 62 IN | WEIGHT: 196.88 LBS | DIASTOLIC BLOOD PRESSURE: 73 MMHG | SYSTOLIC BLOOD PRESSURE: 118 MMHG | HEART RATE: 94 BPM | BODY MASS INDEX: 36.23 KG/M2

## 2024-05-09 DIAGNOSIS — M25.569 KNEE PAIN, UNSPECIFIED CHRONICITY, UNSPECIFIED LATERALITY: Primary | ICD-10-CM

## 2024-05-09 DIAGNOSIS — M25.569 KNEE PAIN, UNSPECIFIED CHRONICITY, UNSPECIFIED LATERALITY: ICD-10-CM

## 2024-05-09 PROCEDURE — 99999 PR PBB SHADOW E&M-EST. PATIENT-LVL III: CPT | Mod: PBBFAC,,, | Performed by: ORTHOPAEDIC SURGERY

## 2024-05-09 PROCEDURE — 3074F SYST BP LT 130 MM HG: CPT | Mod: CPTII,S$GLB,, | Performed by: ORTHOPAEDIC SURGERY

## 2024-05-09 PROCEDURE — 1159F MED LIST DOCD IN RCRD: CPT | Mod: CPTII,S$GLB,, | Performed by: ORTHOPAEDIC SURGERY

## 2024-05-09 PROCEDURE — 3078F DIAST BP <80 MM HG: CPT | Mod: CPTII,S$GLB,, | Performed by: ORTHOPAEDIC SURGERY

## 2024-05-09 PROCEDURE — 99214 OFFICE O/P EST MOD 30 MIN: CPT | Mod: S$GLB,,, | Performed by: ORTHOPAEDIC SURGERY

## 2024-05-09 PROCEDURE — 73564 X-RAY EXAM KNEE 4 OR MORE: CPT | Mod: TC,50

## 2024-05-09 PROCEDURE — 73564 X-RAY EXAM KNEE 4 OR MORE: CPT | Mod: 26,,, | Performed by: RADIOLOGY

## 2024-05-09 PROCEDURE — 3008F BODY MASS INDEX DOCD: CPT | Mod: CPTII,S$GLB,, | Performed by: ORTHOPAEDIC SURGERY

## 2024-05-09 NOTE — PROGRESS NOTES
CC: Left knee pain      43 y.o. Female with a 3 history of atraumatic left knee pain.  Patient works as a school nurse. She is s/p below procedure on her right knee. Right knee is doing well without issues. In regards to her left knee, it started bothering her after PT in February without any trauma. It initially got worse over the next few months but once she started taking Aleve, her pain completely resolved. She is pain free today and has no issues with the knee today. Denies swelling in the knee. Most of her pain was located posteriorly.     Pain is 1/10 at it's worst.    DATE OF PROCEDURE: 6/28/2023     PREOPERATIVE DIAGNOSES:   1. Right knee high grade medial collateral ligament (MCL) tear.      POSTOPERATIVE DIAGNOSES:   1. Right knee high grade medial collateral ligament (MCL) tear.      PROCEDURES:   1. Right knee MCL repair with internal brace  2. Diagnostic right knee arthroscopy    REVIEW OF SYSTEMS:  Constitution: Negative. Negative for chills, fever and night sweats.   HENT: Negative for congestion and headaches.    Eyes: Negative for blurred vision, left vision loss and right vision loss.   Cardiovascular: Negative for chest pain and syncope.   Respiratory: Negative for cough and shortness of breath.    Endocrine: Negative for polydipsia, polyphagia and polyuria.   Hematologic/Lymphatic: Negative for bleeding problem. Does not bruise/bleed easily.   Skin: Negative for dry skin, itching and rash.   Musculoskeletal: Negative for falls. Positive for left knee pain and  muscle weakness.   Gastrointestinal: Negative for abdominal pain and bowel incontinence.   Genitourinary: Negative for bladder incontinence and nocturia.   Neurological: Negative for disturbances in coordination, loss of balance and seizures.   Psychiatric/Behavioral: Negative for depression. The patient does not have insomnia.    Allergic/Immunologic: Negative for hives and persistent infections.     PAST MEDICAL HISTORY:    History  reviewed. No pertinent past medical history.    PAST SURGICAL HISTORY:   Past Surgical History:   Procedure Laterality Date    ARTHROSCOPY OF KNEE Right 2023    Procedure: ARTHROSCOPY, KNEE;  Surgeon: Lamine Early MD;  Location: [facility] OR;  Service: Orthopedics;  Laterality: Right;     SECTION      HAND SURGERY      REPAIR OF LIGAMENT Right 2023    Procedure: REPAIR, LIGAMENT;  Surgeon: Lamine Early MD;  Location: [facility] OR;  Service: Orthopedics;  Laterality: Right;       FAMILY HISTORY:   Family History   Problem Relation Name Age of Onset    Diabetes Maternal Grandmother      Cancer Maternal Grandmother      Colon cancer Maternal Uncle      Breast cancer Neg Hx      Ovarian cancer Neg Hx         SOCIAL HISTORY:   Social History     Socioeconomic History    Marital status:    Tobacco Use    Smoking status: Never    Smokeless tobacco: Never   Substance and Sexual Activity    Alcohol use: Yes     Comment: socially    Drug use: No    Sexual activity: Yes     Partners: Male     Birth control/protection: Condom       MEDICATIONS:     Current Outpatient Medications:     ibuprofen (ADVIL,MOTRIN) 600 MG tablet, Take 600 mg by mouth every 8 (eight) hours as needed., Disp: , Rfl:     ibuprofen (ADVIL,MOTRIN) 800 MG tablet, Take 1 tablet (800 mg total) by mouth 3 (three) times daily as needed for Pain., Disp: 60 tablet, Rfl: 1    ondansetron (ZOFRAN) 4 MG tablet, Take 1 tablet (4 mg total) by mouth every 8 (eight) hours as needed for Nausea., Disp: 20 tablet, Rfl: 0    aspirin 81 MG Chew, Chew and swallow 1 tablet (81 mg total) by mouth 2 (two) times a day. for 14 days, Disp: 28 tablet, Rfl: 0    oxyCODONE (ROXICODONE) 5 MG immediate release tablet, Take 1 tablet (5 mg total) by mouth every 6 (six) hours as needed for Pain. (Patient not taking: Reported on 2023), Disp: 28 tablet, Rfl: 0    traMADoL (ULTRAM) 50 mg tablet, Take 1 tablet (50 mg total) by mouth every 6 (six) hours as  "needed for Pain. (Patient not taking: Reported on 7/13/2023), Disp: 20 tablet, Rfl: 0    ALLERGIES:   Review of patient's allergies indicates:  No Known Allergies    VITAL SIGNS:   /73   Pulse 94   Ht 5' 2" (1.575 m)   Wt 89.3 kg (196 lb 13.9 oz)   BMI 36.01 kg/m²      PHYSICAL EXAMINATION  General:  The patient is alert and oriented x 3.  Mood is pleasant.  Observation of ears, eyes and nose reveal no gross abnormalities.  No labored breathing observed.    LEFT KNEE EXAMINATION     OBSERVATION / INSPECTION   Gait:   Nonantalgic   Alignment:  Neutral   Scars:   + right medial   Muscle atrophy: Mild  Effusion:  None   Warmth:  None   Discoloration:   none     TENDERNESS / CREPITUS (T / C):          T / C      T / C   Patella   - / -   Lateral joint line   - / -   Peripatellar medial  -  Medial joint line    - / -   Peripatellar lateral -  Medial plica   - / -   Patellar tendon -   Popliteal fossa  - / -   Quad tendon   -   Gastrocnemius   -   Prepatellar Bursa - / -   Quadricep   -   Tibial tubercle  -  Thigh/hamstring  -   Pes anserine/HS -  Fibula    -   ITB   - / -  Tibia     -   Tib/fib joint  - / -  LCL    -     MFC   - / -   MCL: Proximal  -    LFC   - / -    Distal   -          ROM: (* = pain)  PASSIVE   ACTIVE    Left :   5 / 0 / 145   5 / 0 / 145     Right :    5 / 0 / 145   5 / 0 / 145    Patellofemoral examination:  See above noted areas of tenderness.   Patella position    Subluxation / dislocation: Centered           Sup. / Inf;   Normal   Crepitus (PF):    Absent   Patellar Mobility:       Medial-lateral:   Normal    Superior-inferior:  Normal    Inferior tilt   Normal    Patellar tendon:  Normal   Lateral tilt:    Normal   J-sign:     None   Patellofemoral grind:   No pain       MENISCAL SIGNS:     Pain on terminal extension:  -  Pain on terminal flexion:  -  Ramóns maneuver:  - (for pain or mechanical sx)  Squat     - (for pain or mechanical sx)    LIGAMENT EXAMINATION:  ACL / Lachman: "  normal (-1 to 2mm)    PCL-Post.  drawer: normal 0 to 2mm  MCL- Valgus:  normal 0 to 2mm  LCL- Varus:  normal 0 to 2mm  Pivot shift: normal (Equal)   Dial Test: difference c/w other side   At 30° flexion: normal (< 5°)    At 90° flexion: normal (< 5°)   Reverse Pivot Shift:   normal (Equal)     STRENGTH: (* = with pain) PAINFUL SIDE   Quadricep   5/5   Hamstrin/5    EXTREMITY NEURO-VASCULAR EXAMINATION:   Sensation:  Grossly intact to light touch all dermatomal regions.   Motor Function:  Fully intact motor function at hip, knee, foot and ankle    DTRs;  quadriceps and  achilles 2+.  No clonus and downgoing Babinski.    Vascular status:  DP and PT pulses 2+, brisk capillary refill, symmetric.       IMAGING:     X-rays including standing, weight bearing AP and flexion bilateral knees, lateral and merchant views ordered and images reviewed by me show:    No fracture, dislocation or other pathology   Medial compartment: no degenerative changes   Lateral compartment: no degenerative changes   Patellofemoral compartment: no degenerative changes       ASSESSMENT:    Left Knee  Pain, possible   1. Knee pain, unspecified chronicity, unspecified laterality         PLAN:   1. C/w Aleve PRN   2. F/u PRN if her pain returns, will consider injection/PT vs MRI     All questions were answered, pt will contact us for questions or concerns in the interim.

## 2024-05-30 ENCOUNTER — OFFICE VISIT (OUTPATIENT)
Dept: PRIMARY CARE CLINIC | Facility: CLINIC | Age: 44
End: 2024-05-30
Payer: COMMERCIAL

## 2024-05-30 ENCOUNTER — LAB VISIT (OUTPATIENT)
Dept: LAB | Facility: HOSPITAL | Age: 44
End: 2024-05-30
Attending: INTERNAL MEDICINE
Payer: COMMERCIAL

## 2024-05-30 VITALS
HEIGHT: 62 IN | WEIGHT: 191.13 LBS | OXYGEN SATURATION: 98 % | BODY MASS INDEX: 35.17 KG/M2 | SYSTOLIC BLOOD PRESSURE: 114 MMHG | DIASTOLIC BLOOD PRESSURE: 76 MMHG | HEART RATE: 81 BPM | RESPIRATION RATE: 14 BRPM

## 2024-05-30 DIAGNOSIS — Z00.00 PREVENTATIVE HEALTH CARE: Primary | ICD-10-CM

## 2024-05-30 DIAGNOSIS — E66.9 OBESITY (BMI 30.0-34.9): ICD-10-CM

## 2024-05-30 DIAGNOSIS — Z00.00 PREVENTATIVE HEALTH CARE: ICD-10-CM

## 2024-05-30 PROBLEM — H83.09 ACUTE LABYRINTHITIS, UNSPECIFIED LATERALITY: Status: RESOLVED | Noted: 2018-02-19 | Resolved: 2024-05-30

## 2024-05-30 PROBLEM — M25.511 RIGHT SHOULDER PAIN: Status: RESOLVED | Noted: 2022-05-04 | Resolved: 2024-05-30

## 2024-05-30 PROBLEM — E66.811 OBESITY (BMI 30.0-34.9): Status: ACTIVE | Noted: 2024-05-30

## 2024-05-30 PROBLEM — M25.561 ACUTE PAIN OF RIGHT KNEE: Status: RESOLVED | Noted: 2023-06-30 | Resolved: 2024-05-30

## 2024-05-30 LAB
ALBUMIN SERPL BCP-MCNC: 3.9 G/DL (ref 3.5–5.2)
ALP SERPL-CCNC: 68 U/L (ref 55–135)
ALT SERPL W/O P-5'-P-CCNC: 14 U/L (ref 10–44)
ANION GAP SERPL CALC-SCNC: 7 MMOL/L (ref 8–16)
AST SERPL-CCNC: 19 U/L (ref 10–40)
BASOPHILS # BLD AUTO: 0.02 K/UL (ref 0–0.2)
BASOPHILS NFR BLD: 0.5 % (ref 0–1.9)
BILIRUB SERPL-MCNC: 0.4 MG/DL (ref 0.1–1)
BUN SERPL-MCNC: 9 MG/DL (ref 6–20)
CALCIUM SERPL-MCNC: 9.7 MG/DL (ref 8.7–10.5)
CHLORIDE SERPL-SCNC: 108 MMOL/L (ref 95–110)
CHOLEST SERPL-MCNC: 184 MG/DL (ref 120–199)
CHOLEST/HDLC SERPL: 3.1 {RATIO} (ref 2–5)
CO2 SERPL-SCNC: 23 MMOL/L (ref 23–29)
CREAT SERPL-MCNC: 0.9 MG/DL (ref 0.5–1.4)
DIFFERENTIAL METHOD BLD: ABNORMAL
EOSINOPHIL # BLD AUTO: 0.1 K/UL (ref 0–0.5)
EOSINOPHIL NFR BLD: 2 % (ref 0–8)
ERYTHROCYTE [DISTWIDTH] IN BLOOD BY AUTOMATED COUNT: 14 % (ref 11.5–14.5)
EST. GFR  (NO RACE VARIABLE): >60 ML/MIN/1.73 M^2
ESTIMATED AVG GLUCOSE: 108 MG/DL (ref 68–131)
GLUCOSE SERPL-MCNC: 80 MG/DL (ref 70–110)
HBA1C MFR BLD: 5.4 % (ref 4–5.6)
HCT VFR BLD AUTO: 39.6 % (ref 37–48.5)
HDLC SERPL-MCNC: 59 MG/DL (ref 40–75)
HDLC SERPL: 32.1 % (ref 20–50)
HGB BLD-MCNC: 12.4 G/DL (ref 12–16)
IMM GRANULOCYTES # BLD AUTO: 0.01 K/UL (ref 0–0.04)
IMM GRANULOCYTES NFR BLD AUTO: 0.2 % (ref 0–0.5)
LDLC SERPL CALC-MCNC: 112.8 MG/DL (ref 63–159)
LYMPHOCYTES # BLD AUTO: 1.9 K/UL (ref 1–4.8)
LYMPHOCYTES NFR BLD: 46.8 % (ref 18–48)
MAGNESIUM SERPL-MCNC: 2 MG/DL (ref 1.6–2.6)
MCH RBC QN AUTO: 30.1 PG (ref 27–31)
MCHC RBC AUTO-ENTMCNC: 31.3 G/DL (ref 32–36)
MCV RBC AUTO: 96 FL (ref 82–98)
MONOCYTES # BLD AUTO: 0.5 K/UL (ref 0.3–1)
MONOCYTES NFR BLD: 12.5 % (ref 4–15)
NEUTROPHILS # BLD AUTO: 1.6 K/UL (ref 1.8–7.7)
NEUTROPHILS NFR BLD: 38 % (ref 38–73)
NONHDLC SERPL-MCNC: 125 MG/DL
NRBC BLD-RTO: 0 /100 WBC
PLATELET # BLD AUTO: 366 K/UL (ref 150–450)
PMV BLD AUTO: 10.4 FL (ref 9.2–12.9)
POTASSIUM SERPL-SCNC: 4 MMOL/L (ref 3.5–5.1)
PROT SERPL-MCNC: 7.6 G/DL (ref 6–8.4)
RBC # BLD AUTO: 4.12 M/UL (ref 4–5.4)
SODIUM SERPL-SCNC: 138 MMOL/L (ref 136–145)
TRIGL SERPL-MCNC: 61 MG/DL (ref 30–150)
WBC # BLD AUTO: 4.08 K/UL (ref 3.9–12.7)

## 2024-05-30 PROCEDURE — 99999 PR PBB SHADOW E&M-EST. PATIENT-LVL III: CPT | Mod: PBBFAC,,, | Performed by: INTERNAL MEDICINE

## 2024-05-30 PROCEDURE — 3078F DIAST BP <80 MM HG: CPT | Mod: CPTII,S$GLB,, | Performed by: INTERNAL MEDICINE

## 2024-05-30 PROCEDURE — 80053 COMPREHEN METABOLIC PANEL: CPT | Performed by: INTERNAL MEDICINE

## 2024-05-30 PROCEDURE — 85025 COMPLETE CBC W/AUTO DIFF WBC: CPT | Performed by: INTERNAL MEDICINE

## 2024-05-30 PROCEDURE — 1159F MED LIST DOCD IN RCRD: CPT | Mod: CPTII,S$GLB,, | Performed by: INTERNAL MEDICINE

## 2024-05-30 PROCEDURE — 83735 ASSAY OF MAGNESIUM: CPT | Performed by: INTERNAL MEDICINE

## 2024-05-30 PROCEDURE — 80061 LIPID PANEL: CPT | Performed by: INTERNAL MEDICINE

## 2024-05-30 PROCEDURE — 3074F SYST BP LT 130 MM HG: CPT | Mod: CPTII,S$GLB,, | Performed by: INTERNAL MEDICINE

## 2024-05-30 PROCEDURE — 36415 COLL VENOUS BLD VENIPUNCTURE: CPT | Mod: PN | Performed by: INTERNAL MEDICINE

## 2024-05-30 PROCEDURE — 3008F BODY MASS INDEX DOCD: CPT | Mod: CPTII,S$GLB,, | Performed by: INTERNAL MEDICINE

## 2024-05-30 PROCEDURE — 99396 PREV VISIT EST AGE 40-64: CPT | Mod: S$GLB,,, | Performed by: INTERNAL MEDICINE

## 2024-05-30 PROCEDURE — 83036 HEMOGLOBIN GLYCOSYLATED A1C: CPT | Performed by: INTERNAL MEDICINE

## 2024-05-30 NOTE — PATIENT INSTRUCTIONS
Exercise 30 min 5 times per week, decrease portion sizes by 50%; encourage plant-based diet;  drink 6-8 glasses of water daily, avoid fast foods, creamy, rich foods soledad ice cream, cheese creamy salad dressings;avoid eating 3 hours prior to bedtime; consider 8-10 hour intermittent diet; avoid simple sugars soledad white bread, rice, potatoes, noodles/pasta; No sweetened drinks.     Labs today  Get COVID booster   Get natural sunlight

## 2024-05-30 NOTE — ASSESSMENT & PLAN NOTE
Exercise 30 min 5 times per week, decrease portion sizes by 50%; encourage plant-based diet;  drink 6-8 glasses of water daily, avoid fast foods, creamy, rich foods soledad ice cream, cheese creamy salad dressings;avoid eating 3 hours prior to bedtime; consider 8-10 hour intermittent diet; avoid simple sugars soledad white bread, rice, potatoes, noodles/pasta; No sweetened drinks.

## 2024-05-30 NOTE — PROGRESS NOTES
[facility] Progress Note      Chief Complaint     Establish Care       Subjective:      History of Present Illness:  Tato Godinez is a 43 y.o. female former pt of Dr. Lorenzo at [facility]     Palpitations: has intermittent racing heart without SOB, FLORES, chest pain for which an EKG was done in 2023    S/p right MCL tear  repair in 2023 by Dr. Early - now fully recovered; finished PT at end of 3/2024; plans to join gym    Obesity: plans to start exercising again since she really loves to exercise but was unable to do so after she tore her right MCL    Left eye blepharospasm: saw optometrist and was told to avoid caffeine and to drink tonic water; likely due to stress    Situational stress: now less stressed since does not working during the summer but trying to raise her twin girls as a single mom since she receives no financial assistance from her ex-    Bilateral CTS s/p right repair   SH: has twins C/S now 13 y/o travel volleyball at [employer]; 23 y/o girl;  x 7 years - took him to court and now on disability; now has full custody and gets no monies for him; 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  Celibate x 6 mo with neg STDs; in a relationship x 1 year  FH: mom HTN: dad DM II    Past Medical History:  Past Medical History:   Diagnosis Date    Acute labyrinthitis, unspecified laterality 2018    Acute pain of right knee 2023    Right shoulder pain 2022       Past Surgical History:  Past Surgical History:   Procedure Laterality Date    ARTHROSCOPY OF KNEE Right 2023    Procedure: ARTHROSCOPY, KNEE;  Surgeon: Lamine Early MD;  Location: [facility] OR;  Service: Orthopedics;  Laterality: Right;     SECTION      HAND SURGERY      REPAIR OF LIGAMENT Right 2023    Procedure: REPAIR, LIGAMENT;  Surgeon: Lamine Early MD;  Location: [facility] OR;  Service:  "Orthopedics;  Laterality: Right;       Allergies:  Review of patient's allergies indicates:  No Known Allergies    Home Medications:  No current outpatient medications on file.     No current facility-administered medications for this visit.        Family History:  Family History   Problem Relation Name Age of Onset    Diabetes Maternal Grandmother      Cancer Maternal Grandmother      Colon cancer Maternal Uncle      Breast cancer Neg Hx      Ovarian cancer Neg Hx         Social History:  Social History     Tobacco Use    Smoking status: Never    Smokeless tobacco: Never   Substance Use Topics    Alcohol use: Yes     Comment: socially    Drug use: No       Review of Systems:  Pertinent positives and negatives listed in HPI. All other systems are reviewed and are negative.    Health Maintaince :   Health Maintenance Topics with due status: Not Due       Topic Last Completion Date    TETANUS VACCINE 03/31/2022    Cervical Cancer Screening 11/21/2022    Mammogram 11/24/2023    Hemoglobin A1c (Diabetic Prevention Screening) 05/30/2024           Eye: UTD  Dental: UTD     Immunizations:   Tdap: 3/2022.  COVID: needs  Hepatitis C:   Cancer Screening:  PAP: UTD 11/2022  Mammogram: UTD 11/2023    The 10-year ASCVD risk score (Jay LOYA, et al., 2019) is: 0.3%    Values used to calculate the score:      Age: 43 years      Sex: Female      Is Non- : Yes      Diabetic: No      Tobacco smoker: No      Systolic Blood Pressure: 114 mmHg      Is BP treated: No      HDL Cholesterol: 59 mg/dL      Total Cholesterol: 184 mg/dL      Objective:   /76   Pulse 81   Resp 14   Ht 5' 2" (1.575 m)   Wt 86.7 kg (191 lb 2.2 oz)   LMP 05/27/2024   SpO2 98%   BMI 34.96 kg/m²      Body mass index is 34.96 kg/m².       Physical Examination:  General: Alert and awake in no apparent distress  HEENT: Normocephalic and atraumatic; Tms WNL  Eyes:  PERRL; EOMi with anicteric sclera and clear " conjunctivae  Mouth:  Oropharynx clear and without exudate; moist mucous membranes  Neck:   Cervical nodes not enlarged; supple; no bruits  Cardio:  Regular rate and rhythm with normal S1 and S2; no murmurs or rubs  Resp:  CTAB; respirations unlabored; no wheezes, crackles or rhonchi  Abdom: Soft, NTND with normoactive bowel sounds; negative HSM  Extrem: Warm and well-perfused with no clubbing, cyanosis or edema  Skin:  No rashes, lesions, or color changes  Pulses:  2+ and symmetric distally  Neuro:  AAOx3; cooperative and pleasant with no focal deficits    Laboratory:      Most Recent Data:  Lab Results   Component Value Date    WBC 4.08 05/30/2024    HGB 12.4 05/30/2024    HCT 39.6 05/30/2024     05/30/2024    CHOL 184 05/30/2024    TRIG 61 05/30/2024    HDL 59 05/30/2024    ALT 14 05/30/2024    AST 19 05/30/2024     05/30/2024    K 4.0 05/30/2024     05/30/2024    BUN 9 05/30/2024    CO2 23 05/30/2024    TSH 1.194 04/28/2022    HGBA1C 5.4 05/30/2024              CBC:   WBC   Date Value Ref Range Status   05/30/2024 4.08 3.90 - 12.70 K/uL Final     Hemoglobin   Date Value Ref Range Status   05/30/2024 12.4 12.0 - 16.0 g/dL Final     Hematocrit   Date Value Ref Range Status   05/30/2024 39.6 37.0 - 48.5 % Final     Platelets   Date Value Ref Range Status   05/30/2024 366 150 - 450 K/uL Final     MCV   Date Value Ref Range Status   05/30/2024 96 82 - 98 fL Final     RDW   Date Value Ref Range Status   05/30/2024 14.0 11.5 - 14.5 % Final     BMP:   Sodium   Date Value Ref Range Status   05/30/2024 138 136 - 145 mmol/L Final     Potassium   Date Value Ref Range Status   05/30/2024 4.0 3.5 - 5.1 mmol/L Final     Chloride   Date Value Ref Range Status   05/30/2024 108 95 - 110 mmol/L Final     CO2   Date Value Ref Range Status   05/30/2024 23 23 - 29 mmol/L Final     BUN   Date Value Ref Range Status   05/30/2024 9 6 - 20 mg/dL Final     Creatinine   Date Value Ref Range Status   05/30/2024 0.9 0.5 -  "1.4 mg/dL Final     Glucose   Date Value Ref Range Status   05/30/2024 80 70 - 110 mg/dL Final     Calcium   Date Value Ref Range Status   05/30/2024 9.7 8.7 - 10.5 mg/dL Final     Magnesium   Date Value Ref Range Status   05/30/2024 2.0 1.6 - 2.6 mg/dL Final     LFTs:   Total Protein   Date Value Ref Range Status   05/30/2024 7.6 6.0 - 8.4 g/dL Final     Albumin   Date Value Ref Range Status   05/30/2024 3.9 3.5 - 5.2 g/dL Final     Total Bilirubin   Date Value Ref Range Status   05/30/2024 0.4 0.1 - 1.0 mg/dL Final     Comment:     For infants and newborns, interpretation of results should be based  on gestational age, weight and in agreement with clinical  observations.    Premature Infant recommended reference ranges:  Up to 24 hours.............<8.0 mg/dL  Up to 48 hours............<12.0 mg/dL  3-5 days..................<15.0 mg/dL  6-29 days.................<15.0 mg/dL       AST   Date Value Ref Range Status   05/30/2024 19 10 - 40 U/L Final     Alkaline Phosphatase   Date Value Ref Range Status   05/30/2024 68 55 - 135 U/L Final     ALT   Date Value Ref Range Status   05/30/2024 14 10 - 44 U/L Final     Coags: No results found for: "INR", "PROTIME", "PTT"  FLP:      Lab Results   Component Value Date    CHOL 184 05/30/2024    CHOL 185 04/28/2022    CHOL 187 11/25/2020     Lab Results   Component Value Date    HDL 59 05/30/2024    HDL 76 (H) 04/28/2022    HDL 76 (H) 11/25/2020     Lab Results   Component Value Date    LDLCALC 112.8 05/30/2024    LDLCALC 96.6 04/28/2022    LDLCALC 99.0 11/25/2020     Lab Results   Component Value Date    TRIG 61 05/30/2024    TRIG 62 04/28/2022    TRIG 60 11/25/2020     Lab Results   Component Value Date    CHOLHDL 32.1 05/30/2024    CHOLHDL 41.1 04/28/2022    CHOLHDL 40.6 11/25/2020      DM:      Hemoglobin A1C   Date Value Ref Range Status   05/30/2024 5.4 4.0 - 5.6 % Final     Comment:     ADA Screening Guidelines:  5.7-6.4%  Consistent with prediabetes  >or=6.5%  Consistent " "with diabetes    High levels of fetal hemoglobin interfere with the HbA1C  assay. Heterozygous hemoglobin variants (HbS, HgC, etc)do  not significantly interfere with this assay.   However, presence of multiple variants may affect accuracy.     04/28/2022 5.3 4.0 - 5.6 % Final     Comment:     ADA Screening Guidelines:  5.7-6.4%  Consistent with prediabetes  >or=6.5%  Consistent with diabetes    High levels of fetal hemoglobin interfere with the HbA1C  assay. Heterozygous hemoglobin variants (HbS, HgC, etc)do  not significantly interfere with this assay.   However, presence of multiple variants may affect accuracy.     11/25/2020 5.3 4.0 - 5.6 % Final     Comment:     ADA Screening Guidelines:  5.7-6.4%  Consistent with prediabetes  >or=6.5%  Consistent with diabetes  High levels of fetal hemoglobin interfere with the HbA1C  assay. Heterozygous hemoglobin variants (HbS, HgC, etc)do  not significantly interfere with this assay.   However, presence of multiple variants may affect accuracy.       LDL Cholesterol   Date Value Ref Range Status   05/30/2024 112.8 63.0 - 159.0 mg/dL Final     Comment:     The National Cholesterol Education Program (NCEP) has set the  following guidelines (reference values) for LDL Cholesterol:  Optimal.......................<130 mg/dL  Borderline High...............130-159 mg/dL  High..........................160-189 mg/dL  Very High.....................>190 mg/dL       Creatinine   Date Value Ref Range Status   05/30/2024 0.9 0.5 - 1.4 mg/dL Final     Thyroid:   TSH   Date Value Ref Range Status   04/28/2022 1.194 0.400 - 4.000 uIU/mL Final     Anemia: No results found for: "IRON", "TIBC", "FERRITIN", "[id number]", "FOLATE"  Cardiac: No results found for: "TROPONINI", "CKTOTAL", "CKMB", "BNP"  Urinalysis:   Color, UA   Date Value Ref Range Status   05/30/2024 Yellow Yellow, Straw, Alexa Final     Specific Gravity, UA   Date Value Ref Range Status   05/30/2024 1.020 1.005 - 1.030 Final "     Nitrite, UA   Date Value Ref Range Status   05/30/2024 Negative Negative Final     Ketones, UA   Date Value Ref Range Status   05/30/2024 Negative Negative Final     WBC, UA   Date Value Ref Range Status   05/30/2024 3 0 - 5 /hpf Final       Other Results:  EKG (my interpretation):     Radiology:  X-ray Knee Ortho Bilateral with Flexion  Narrative: EXAMINATION:  XR KNEE ORTHO BILAT WITH FLEXION    CLINICAL HISTORY:  Pain in unspecified knee    TECHNIQUE:  XR KNEE ORTHO BILAT WITH FLEXION    COMPARISON:  None    FINDINGS:  Mild bilateral lateral patellofemoral joint space narrowing.  Femorotibial joint spaces preserved.  Osseous hypertrophy of the tibial tuberosity is seen bilaterally compatible with remote Osgood Schlatter's.  No fracture or effusion.  Impression: See above    Electronically signed by: Leonard Card Jr  Date:    05/09/2024  Time:    15:29          Assessment/Plan     Tato Godinez is a 43 y.o. female with:  1. Preventative health care  Assessment & Plan:  Labs today  Get COVID booster   Exercise 150 min per week - get natural sunlight     Orders:  -     Lipid Panel; Future; Expected date: 05/30/2024  -     Hemoglobin A1C; Future; Expected date: 05/30/2024  -     Urinalysis; Future; Expected date: 05/30/2024  -     Comprehensive Metabolic Panel; Future; Expected date: 05/30/2024  -     CBC Auto Differential; Future; Expected date: 05/30/2024  -     Magnesium; Future; Expected date: 05/30/2024    2. Obesity (BMI 30.0-34.9)  Assessment & Plan:  Exercise 30 min 5 times per week, decrease portion sizes by 50%; encourage plant-based diet;  drink 6-8 glasses of water daily, avoid fast foods, creamy, rich foods soledad ice cream, cheese creamy salad dressings;avoid eating 3 hours prior to bedtime; consider 8-10 hour intermittent diet; avoid simple sugars soledad white bread, rice, potatoes, noodles/pasta; No sweetened drinks.                I spent a total of 28 minutes on the day of the visit.This  includes face to face time and non-face to face time preparing to see the patient (eg, review of tests), obtaining and/or reviewing separately obtained history, documenting clinical information in the electronic or other health record, independently interpreting results and communicating results to the patient/family/caregiver, or care coordinator.   Code Status:     Florence De La Torre MD

## 2024-07-25 ENCOUNTER — OFFICE VISIT (OUTPATIENT)
Dept: OBSTETRICS AND GYNECOLOGY | Facility: CLINIC | Age: 44
End: 2024-07-25
Payer: COMMERCIAL

## 2024-07-25 VITALS
DIASTOLIC BLOOD PRESSURE: 76 MMHG | WEIGHT: 198.44 LBS | HEIGHT: 62 IN | SYSTOLIC BLOOD PRESSURE: 118 MMHG | BODY MASS INDEX: 36.52 KG/M2

## 2024-07-25 DIAGNOSIS — N64.4 BREAST PAIN, LEFT: Primary | ICD-10-CM

## 2024-07-25 PROCEDURE — 3008F BODY MASS INDEX DOCD: CPT | Mod: CPTII,S$GLB,,

## 2024-07-25 PROCEDURE — 3044F HG A1C LEVEL LT 7.0%: CPT | Mod: CPTII,S$GLB,,

## 2024-07-25 PROCEDURE — 99999 PR PBB SHADOW E&M-EST. PATIENT-LVL III: CPT | Mod: PBBFAC,,,

## 2024-07-25 PROCEDURE — 3074F SYST BP LT 130 MM HG: CPT | Mod: CPTII,S$GLB,,

## 2024-07-25 PROCEDURE — 1160F RVW MEDS BY RX/DR IN RCRD: CPT | Mod: CPTII,S$GLB,,

## 2024-07-25 PROCEDURE — 99213 OFFICE O/P EST LOW 20 MIN: CPT | Mod: S$GLB,,,

## 2024-07-25 PROCEDURE — 3078F DIAST BP <80 MM HG: CPT | Mod: CPTII,S$GLB,,

## 2024-07-25 PROCEDURE — 1159F MED LIST DOCD IN RCRD: CPT | Mod: CPTII,S$GLB,,

## 2024-07-25 NOTE — PROGRESS NOTES
History & Physical  Gynecology      SUBJECTIVE:     Chief Complaint:   Breast Pain     History of Present Illness  Tato Godinez is a 43 y.o. female presents complains of left breast pain  Breast pain that began around .  The pain is located left breast. She describes the pain as burning, tenderness and pulling. Feels slight knot- describes it as inflamed vein. Pain comes and goes. She is not on OCPs or HRT.  She denies skin changes.  She denies nipple discharge.  She is not breastfeeding or pumping.  She denies any pain to her right breast.  Denies any recent vigorous or repetitive use of pectoral muscles.  No associated neck, back, or shoulder problems. No associated fever or erythema.  No recent history of trauma to the chest.  She has tried ibuprofen which helps some. The pain does not affect her ability to perform daily activities.  Denies family history of breast cancer.  Does report hx of dense breasts. Mammo done 23- normal, dense breasts, TC 5.38%.       BP Readings from Last 2 Encounters:   24 118/76   24 114/76          Review of patient's allergies indicates:  No Known Allergies    Past Medical History:   Diagnosis Date    Acute labyrinthitis, unspecified laterality 2018    Acute pain of right knee 2023    Right shoulder pain 2022     Past Surgical History:   Procedure Laterality Date    ARTHROSCOPY OF KNEE Right 2023    Procedure: ARTHROSCOPY, KNEE;  Surgeon: Lamine Early MD;  Location: [facility] OR;  Service: Orthopedics;  Laterality: Right;     SECTION      HAND SURGERY      REPAIR OF LIGAMENT Right 2023    Procedure: REPAIR, LIGAMENT;  Surgeon: Lamine Early MD;  Location: [facility] OR;  Service: Orthopedics;  Laterality: Right;     OB History          2    Para   2    Term   0       1    AB        Living   3         SAB        IAB        Ectopic        Multiple   1    Live Births   3               Family History    Problem Relation Name Age of Onset    Diabetes Maternal Grandmother      Cancer Maternal Grandmother      Colon cancer Maternal Uncle      Breast cancer Neg Hx      Ovarian cancer Neg Hx       Social History     Tobacco Use    Smoking status: Never    Smokeless tobacco: Never   Substance Use Topics    Alcohol use: Yes     Comment: socially    Drug use: No       No current outpatient medications on file.     No current facility-administered medications for this visit.     Review of Systems:  Review of Systems   Constitutional:  Negative for fever.   Respiratory:  Negative for cough and shortness of breath.    Cardiovascular:  Negative for chest pain and leg swelling.   Integumentary:  Positive for breast tenderness. Negative for nipple discharge and breast skin changes.   Breast: Positive for lump, mastodynia and tenderness.Negative for nipple discharge and skin changes     OBJECTIVE:     Physical Exam:  Physical Exam  Pulmonary:      Effort: Pulmonary effort is normal.   Chest:   Breasts:     Right: Normal. No mass, nipple discharge or skin change.      Left: Tenderness present. No nipple discharge or skin change.          Comments: Pain to left breast   Lymphadenopathy:      Upper Body:      Right upper body: No supraclavicular adenopathy.      Left upper body: No supraclavicular adenopathy.   Skin:     General: Skin is warm and dry.   Neurological:      Mental Status: She is alert and oriented to person, place, and time.   Psychiatric:         Mood and Affect: Mood normal.         Behavior: Behavior normal.       ASSESSMENT:       ICD-10-CM ICD-9-CM    1. Breast pain, left  N64.4 611.71 Mammo Digital Diagnostic Left with Alex      Ambulatory referral/consult to Breast Surgery          Plan:      - Diagnostic mammo ordered  - Referral to [facility] for breast pain and dense breasts     Discussed OTC therapies such as acetaminophen or nonsteroidal anti-inflammatory drugs (NSAIDs). They can both be used to relieve  breast pain. Topical NSAIDS such as OTC Diclofenac (Volteran) gel can be used. Other recommendations include use of a supportive bra. Wearing a soft, supportive bra at night prevents the breasts from pulling down on the chest wall. Some women obtain relief from application of warm compresses or ice packs.      Discussed lifestyle recommendations such as decrease or elimination of caffeine. Also low-fat diet, high complex carbohydrate diet has been shown to be effective.       Alternative therapies such as Evening Primerose Oil (EPO) 1000mg twice daily has been found to be helpful for some patients, results are seen after 3-6 months.      I spent a total of 20 minutes on the day of the visit.This includes face to face time and non-face to face time preparing to see the patient (eg, review of tests), Obtaining and/or reviewing separately obtained history, Documenting clinical information in the electronic or other health record, Independently interpreting resultsand communicating results to the patient/family/caregiver, or Care coordination     THAIS Hills[place]

## 2024-08-07 ENCOUNTER — HOSPITAL ENCOUNTER (OUTPATIENT)
Dept: RADIOLOGY | Facility: HOSPITAL | Age: 44
Discharge: HOME | End: 2024-08-07
Payer: COMMERCIAL

## 2024-08-07 DIAGNOSIS — N64.4 BREAST PAIN, LEFT: ICD-10-CM

## 2024-08-07 PROCEDURE — 77065 DX MAMMO INCL CAD UNI: CPT | Mod: 26,LT,, | Performed by: RADIOLOGY

## 2024-08-07 PROCEDURE — 77065 DX MAMMO INCL CAD UNI: CPT | Mod: TC,LT

## 2024-08-07 PROCEDURE — 77061 BREAST TOMOSYNTHESIS UNI: CPT | Mod: 26,LT,, | Performed by: RADIOLOGY

## 2024-08-07 PROCEDURE — 77061 BREAST TOMOSYNTHESIS UNI: CPT | Mod: TC,LT

## 2024-08-13 ENCOUNTER — OFFICE VISIT (OUTPATIENT)
Dept: SURGERY | Facility: CLINIC | Age: 44
End: 2024-08-13
Payer: COMMERCIAL

## 2024-08-13 VITALS
HEIGHT: 62 IN | HEART RATE: 71 BPM | BODY MASS INDEX: 36.44 KG/M2 | SYSTOLIC BLOOD PRESSURE: 113 MMHG | WEIGHT: 198 LBS | DIASTOLIC BLOOD PRESSURE: 73 MMHG

## 2024-08-13 DIAGNOSIS — N62 MACROMASTIA: Primary | ICD-10-CM

## 2024-08-13 DIAGNOSIS — S83.411A COMPLETE TEAR OF MEDIAL COLLATERAL LIGAMENT OF RIGHT KNEE, INITIAL ENCOUNTER: ICD-10-CM

## 2024-08-13 DIAGNOSIS — Z12.39 SCREENING BREAST EXAMINATION: ICD-10-CM

## 2024-08-13 DIAGNOSIS — M25.561 ACUTE PAIN OF RIGHT KNEE: ICD-10-CM

## 2024-08-13 DIAGNOSIS — N64.4 BREAST PAIN, LEFT: ICD-10-CM

## 2024-08-13 PROCEDURE — 3078F DIAST BP <80 MM HG: CPT | Mod: CPTII,S$GLB,, | Performed by: PHYSICIAN ASSISTANT

## 2024-08-13 PROCEDURE — 3074F SYST BP LT 130 MM HG: CPT | Mod: CPTII,S$GLB,, | Performed by: PHYSICIAN ASSISTANT

## 2024-08-13 PROCEDURE — 3008F BODY MASS INDEX DOCD: CPT | Mod: CPTII,S$GLB,, | Performed by: PHYSICIAN ASSISTANT

## 2024-08-13 PROCEDURE — 3044F HG A1C LEVEL LT 7.0%: CPT | Mod: CPTII,S$GLB,, | Performed by: PHYSICIAN ASSISTANT

## 2024-08-13 PROCEDURE — 1159F MED LIST DOCD IN RCRD: CPT | Mod: CPTII,S$GLB,, | Performed by: PHYSICIAN ASSISTANT

## 2024-08-13 PROCEDURE — 99999 PR PBB SHADOW E&M-EST. PATIENT-LVL III: CPT | Mod: PBBFAC,,, | Performed by: PHYSICIAN ASSISTANT

## 2024-08-13 PROCEDURE — 99203 OFFICE O/P NEW LOW 30 MIN: CPT | Mod: S$GLB,,, | Performed by: PHYSICIAN ASSISTANT

## 2024-08-13 NOTE — PROGRESS NOTES
[facility]  Department of Surgery    REFERRING:  Malka Henderson, NP  [address] [address]  PCP: Florence De La Torre MD  CHIEF COMPLAINT: left breast pain    Subjective:      Tato Godinez is a 43 y.o. premenopausal female referred for evaluation of left breast pain. Change was noted a few weeks ago. States the pain is intermittent. She does admits to chronic mastodynia as well as back issues and grooves in her shoulders from her bra straps due to the large size of her breasts. Admits to weight loss but has not noted a change in her breast size. Imaging performed on 24 for diagnostic evaluation read as BIRADS 1. Patient does routinely do self breast exams.  Patient has noted a change on breast exam.  Patient denies nipple discharge. Patient denies to previous breast biopsy. Patient denies a personal history of breast cancer.    GYN History:  Age of menarche was 15. Patient denies hormonal therapy. Patient is . Age of first live birth was 20.     FAMILY history:  Patient denies significant family history of breast or other cancer.     Past Medical History:   Diagnosis Date    Acute labyrinthitis, unspecified laterality 2018    Acute pain of right knee 2023    Right shoulder pain 2022     Past Surgical History:   Procedure Laterality Date    ARTHROSCOPY OF KNEE Right 2023    Procedure: ARTHROSCOPY, KNEE;  Surgeon: Lamine Early MD;  Location: [facility];  Service: Orthopedics;  Laterality: Right;     SECTION      HAND SURGERY      REPAIR OF LIGAMENT Right 2023    Procedure: REPAIR, LIGAMENT;  Surgeon: Lamine Early MD;  Location: [facility];  Service: Orthopedics;  Laterality: Right;     No current outpatient medications on file prior to visit.     No current facility-administered medications on file prior to visit.     Social History     Socioeconomic History    Marital status:    Tobacco Use    Smoking status:  "Never    Smokeless tobacco: Never   Substance and Sexual Activity    Alcohol use: Yes     Comment: socially    Drug use: No    Sexual activity: Yes     Partners: Male     Birth control/protection: Condom     Family History   Problem Relation Name Age of Onset    Diabetes Maternal Grandmother      Cancer Maternal Grandmother      Colon cancer Maternal Uncle      Breast cancer Neg Hx      Ovarian cancer Neg Hx         Review of Systems  Review of Systems   Constitutional:  Negative for chills, fever and malaise/fatigue.   HENT:  Negative for congestion.    Eyes:  Negative for discharge.   Respiratory:  Negative for cough, shortness of breath and stridor.    Cardiovascular:  Negative for chest pain and palpitations.   Gastrointestinal:  Negative for abdominal pain and nausea.   Neurological:  Negative for headaches.   Psychiatric/Behavioral:  The patient is not nervous/anxious.           Objective:        /73   Pulse 71   Ht 5' 2" (1.575 m)   Wt 89.8 kg (198 lb)   LMP 08/01/2024 (Approximate)   BMI 36.21 kg/m²   Physical Exam   Vitals reviewed.  Constitutional: She is oriented to person, place, and time.   HENT:   Head: Normocephalic and atraumatic.   Nose: Nose normal.   Eyes: Pupils are equal, round, and reactive to light. Right eye exhibits no discharge. Left eye exhibits no discharge.   Pulmonary/Chest: Effort normal and breath sounds normal. No stridor. No respiratory distress. She exhibits no mass, no tenderness and no edema. Right breast exhibits no inverted nipple, no mass, no nipple discharge, no skin change and no tenderness. Left breast exhibits tenderness. Left breast exhibits no inverted nipple, no mass, no nipple discharge and no skin change. No breast swelling or bleeding. Breasts are symmetrical.   Abdominal: Normal appearance.   Genitourinary: No breast swelling or bleeding.   Neurological: She is alert and oriented to person, place, and time.   Skin: Skin is warm and dry.     Psychiatric: " Her behavior is normal. Mood, judgment and thought content normal.         Radiology review: Images personally reviewed by me in the clinic.    8/7/24 Left Diagnostic Mammo:    Findings:  This procedure was performed using tomosynthesis.   Computer-aided detection was utilized in the interpretation of this examination.     The left breast has scattered areas of fibroglandular density. There is no evidence of suspicious masses, microcalcifications or architectural distortion.     Impression:   No mammographic evidence of malignancy.     BI-RADS Category 1: Negative     Recommendation:  Return to annual screening mammogram schedule is recommended     Your estimated lifetime risk of breast cancer (to age 85) based on Tyrer-Cuzick risk assessment model is 5.52%.  According to the American [employer] Society, patients with a lifetime breast cancer risk of 20% or higher might benefit from supplemental screening tests, such as screening breast MRI.    Assessment:      Tato Godinez is a 43 y.o. premenopausal female with left breast pain.      Plan:   We discussed the options for management of breast pain.    Discussed continue with OTC therapies such as acetaminophen or nonsteroidal anti-inflammatory drugs (NSAIDs). They can both be used to relieve breast pain. Topical NSAIDS such as OTC Diclofenac (Volteran) gel can be used. Other recommendations include use of a supportive bra. Wearing a soft, supportive bra at night prevents the breasts from pulling down on the chest wall. Some women obtain relief from application of warm compresses or ice packs. Also, referred for professional fitting of a supportive bra.      Discussed lifestyle recommendations such as decrease or elimination of caffeine. Also low-fat diet, high complex carbohydrate diet has been shown to be effective.       Patient is interested in re-visiting possible breast reduction. She has met with a surgeon previously. Will reach out to his team to follow up for  consultation about moving forward now.     The patient is in agreement with the plan. Questions were encouraged and answered to patient's satisfaction. Tato will call our office with any questions or concerns.

## 2024-08-13 NOTE — Clinical Note
Sara Hooper,   This lady saw yall back in March to discuss breast reduction, but states she had a an issue with her insurance at that time. She would like to follow up again as she thinks she is ready to move forward now.   Manasa

## 2024-08-13 NOTE — PROGRESS NOTES
[facility]  Department of Surgery    REFERRING:  Malka Henderson, NP  [address] [address]  Florence De La Torre MD  CHIEF COMPLAINT: left breast pain    Subjective:      Tato Godinez is a 43 y.o. premenopausal female referred for evaluation of breast pain. Change was noted a few weeks ago.  Patient does routinely do self breast exams.  Patient has noted a change on breast exam.  Patient denies nipple discharge. Patient denies to previous breast biopsy. Patient denies a personal history of breast cancer.    GYN History:  Age of menarche was 15. Premenopausal. Patient denies hormonal therapy. Patient is . Age of first live birth was 20.  Patient did breast feed, twins.     FAMILY history:  M Uncle: prostate cancer  P GF: smoker    Past Medical History:   Diagnosis Date    Acute labyrinthitis, unspecified laterality 2018    Acute pain of right knee 2023    Right shoulder pain 2022     Past Surgical History:   Procedure Laterality Date    ARTHROSCOPY OF KNEE Right 2023    Procedure: ARTHROSCOPY, KNEE;  Surgeon: Lamine Early MD;  Location: [facility];  Service: Orthopedics;  Laterality: Right;     SECTION      HAND SURGERY      REPAIR OF LIGAMENT Right 2023    Procedure: REPAIR, LIGAMENT;  Surgeon: Lamine Early MD;  Location: [facility];  Service: Orthopedics;  Laterality: Right;     No current outpatient medications on file prior to visit.     No current facility-administered medications on file prior to visit.     Social History     Socioeconomic History    Marital status:    Tobacco Use    Smoking status: Never    Smokeless tobacco: Never   Substance and Sexual Activity    Alcohol use: Yes     Comment: socially    Drug use: No    Sexual activity: Yes     Partners: Male     Birth control/protection: Condom     Family History   Problem Relation Name Age of Onset    Diabetes Maternal Grandmother      Cancer Maternal  "Grandmother      Colon cancer Maternal Uncle      Breast cancer Neg Hx      Ovarian cancer Neg Hx         Review of Systems  ROS       Objective:        /73   Pulse 71   Ht 5' 2" (1.575 m)   Wt 89.8 kg (198 lb)   LMP 08/01/2024 (Approximate)   BMI 36.21 kg/m²   Physical Exam      Radiology review: Images personally reviewed by me in the clinic.          Assessment:      aTto Godinez is a 43 y.o. premenopausal female with left breast pain, main complaint today in macromastia.      Plan:   We discussed the options for management of breast pain.    Discussed continue with OTC therapies such as acetaminophen or nonsteroidal anti-inflammatory drugs (NSAIDs). They can both be used to relieve breast pain. Topical NSAIDS such as OTC Diclofenac (Volteran) gel can be used. Other recommendations include use of a supportive bra. Wearing a soft, supportive bra at night prevents the breasts from pulling down on the chest wall. Some women obtain relief from application of warm compresses or ice packs. Also, referred for professional fitting of a supportive bra.      Discussed lifestyle recommendations such as decrease or elimination of caffeine. Also low-fat diet, high complex carbohydrate diet has been shown to be effective.       Alternative therapies such as Evening Primerose Oil (EPO) 1000mg twice daily has been found to be helpful for some patients, results are seen after 3-6 months.    The patient is in agreement with the plan. Questions were encouraged and answered to patient's satisfaction.NAME@ will call our office with any questions or concerns.       "

## 2024-08-14 RX ORDER — IBUPROFEN 800 MG/1
800 TABLET ORAL
Qty: 60 TABLET | Refills: 1 | Status: SHIPPED | OUTPATIENT
Start: 2024-08-14

## 2024-09-02 PROBLEM — Z00.00 PREVENTATIVE HEALTH CARE: Status: RESOLVED | Noted: 2024-05-30 | Resolved: 2024-09-02

## 2024-10-16 ENCOUNTER — OFFICE VISIT (OUTPATIENT)
Dept: PLASTIC SURGERY | Facility: CLINIC | Age: 44
End: 2024-10-16
Payer: COMMERCIAL

## 2024-10-16 VITALS
BODY MASS INDEX: 36.44 KG/M2 | WEIGHT: 198 LBS | HEART RATE: 104 BPM | HEIGHT: 62 IN | DIASTOLIC BLOOD PRESSURE: 81 MMHG | SYSTOLIC BLOOD PRESSURE: 132 MMHG

## 2024-10-16 DIAGNOSIS — N62 MACROMASTIA: ICD-10-CM

## 2024-10-16 PROCEDURE — 99999 PR PBB SHADOW E&M-EST. PATIENT-LVL III: CPT | Mod: PBBFAC,,, | Performed by: SURGERY

## 2024-10-16 PROCEDURE — 99499 UNLISTED E&M SERVICE: CPT | Mod: S$GLB,,, | Performed by: SURGERY

## 2024-10-18 ENCOUNTER — TELEPHONE (OUTPATIENT)
Dept: SPORTS MEDICINE | Facility: CLINIC | Age: 44
End: 2024-10-18
Payer: COMMERCIAL

## 2024-10-22 NOTE — PROGRESS NOTES
"  Patient represents to the Plastic Surgery clinic to discuss BBR.   See note below:    History & Physical    SUBJECTIVE:   Chief complaint: large breasts    History of Present Illness:    Tato Godinez presents to [facility] 2ND FLOOR on 10/16/2024 for evaluation for bilateral breast reduction secondary to symptomatic bilateral large pendulous breast. She has a chief complaint of chronic neck and back pain for >10 years. She has tried NSAIDs, Tylenol, PT, chiropractics, narcotics, muscle relaxants without alleviation of pain. She also complains of deep shoulder grooving from her bra straps as well as macerating rashes below each breast that have not significantly improved with application of OTC medicated ointments and creams.  She currently reports a bra size of 38I but mostly wears sports bras and doesn't know her true bra size. She is employed as an RN at a local school. She denies smoking tobacco or the use of any nicotine containing products.     Review of Systems:  Review of Systems   HENT: Positive for neck pain.    Musculoskeletal: Positive for back pain. Positive for Chest Pain  Abd: Positive for rashes  Neurological: Negative for headaches or dizziness      OBJECTIVE:     /81   Pulse 104   Ht 5' 2" (1.575 m)   Wt 89.8 kg (197 lb 15.6 oz)   BMI 36.21 kg/m²       Physical Exam:    Physical Exam   Constitutional: She is oriented to person, place, and time. She appears well-developed and well-nourished.   Neck: Normal range of motion. Neck supple. No tracheal deviation present.   Cardiovascular: Normal rate, regular rhythm and normal heart sounds.    Pulmonary/Chest: Effort normal and breath sounds normal. bilaterally enlarged breasts, evidence of previous rashes, shoulder grooving, no palpable masses, nipple everted  Abdominal: Soft. Bowel sounds are normal.   Musculoskeletal: Normal range of motion.   Neurological: She is alert and oriented to person, place, and time.   Skin: " Skin is warm.       Last MMG 11/21/22    ASSESSMENT/PLAN:     1.Symptomatic Bilateral Macromastia  2. Chronic neck pain  3. Chronic back pain  4. Chronic breast rashes    PLAN:Plan    -Will need 1000 gm reduction per side  -Will submit paper work for insurance approval  -Photos obtained  -Risk, benefits, and alternatives explained. She understands that the risks include but are not limited to bleeding, scarring, infection, pain, numbness, asymmetry, deformity, open wound, skin necrosis, wound dehiscence, permanent or temporary loss of sensation to the nipple, partial or total nipple loss requiring removal, poor cosmetic outcome, hematoma, seroma and pulmonary emobolus.   - Patient would like to proceed with scheduling bilateral breast reduction pending insurance authorization  - Will plan for procedure post May (has summer off)    All questions were answered. The patient was advised to call the clinic with any questions or concerns prior to their next visit.

## 2024-11-01 ENCOUNTER — HOSPITAL ENCOUNTER (OUTPATIENT)
Dept: RADIOLOGY | Facility: HOSPITAL | Age: 44
Discharge: HOME | End: 2024-11-01
Attending: PHYSICIAN ASSISTANT
Payer: COMMERCIAL

## 2024-11-01 ENCOUNTER — OFFICE VISIT (OUTPATIENT)
Dept: SPORTS MEDICINE | Facility: CLINIC | Age: 44
End: 2024-11-01
Payer: COMMERCIAL

## 2024-11-01 VITALS
SYSTOLIC BLOOD PRESSURE: 114 MMHG | HEIGHT: 62 IN | HEART RATE: 75 BPM | BODY MASS INDEX: 36.86 KG/M2 | DIASTOLIC BLOOD PRESSURE: 76 MMHG | WEIGHT: 200.31 LBS

## 2024-11-01 DIAGNOSIS — M25.521 RIGHT ELBOW PAIN: ICD-10-CM

## 2024-11-01 DIAGNOSIS — M77.11 LATERAL EPICONDYLITIS OF RIGHT ELBOW: ICD-10-CM

## 2024-11-01 DIAGNOSIS — M25.521 RIGHT ELBOW PAIN: Primary | ICD-10-CM

## 2024-11-01 PROCEDURE — 73080 X-RAY EXAM OF ELBOW: CPT | Mod: TC,RT

## 2024-11-01 PROCEDURE — 73080 X-RAY EXAM OF ELBOW: CPT | Mod: 26,RT,, | Performed by: RADIOLOGY

## 2024-11-01 PROCEDURE — 99999 PR PBB SHADOW E&M-EST. PATIENT-LVL III: CPT | Mod: PBBFAC,,, | Performed by: PHYSICIAN ASSISTANT

## 2024-11-01 RX ORDER — CELECOXIB 200 MG/1
200 CAPSULE ORAL 2 TIMES DAILY
Qty: 60 CAPSULE | Refills: 1 | Status: SHIPPED | OUTPATIENT
Start: 2024-11-01 | End: 2024-11-01

## 2024-11-01 RX ORDER — CELECOXIB 200 MG/1
200 CAPSULE ORAL 2 TIMES DAILY
Qty: 180 CAPSULE | Refills: 1 | Status: SHIPPED | OUTPATIENT
Start: 2024-11-01

## 2024-12-12 ENCOUNTER — TELEPHONE (OUTPATIENT)
Dept: OBSTETRICS AND GYNECOLOGY | Facility: CLINIC | Age: 44
End: 2024-12-12
Payer: COMMERCIAL

## 2024-12-12 NOTE — TELEPHONE ENCOUNTER
----- Message from aYya Block sent at 12/11/2024  2:41 PM CST -----  Thank you. I'll take the 7:45 this Friday.

## 2024-12-19 ENCOUNTER — HOSPITAL ENCOUNTER (OUTPATIENT)
Dept: RADIOLOGY | Facility: HOSPITAL | Age: 44
Discharge: HOME | End: 2024-12-19
Attending: INTERNAL MEDICINE
Payer: COMMERCIAL

## 2024-12-19 VITALS — WEIGHT: 200 LBS | BODY MASS INDEX: 36.8 KG/M2 | HEIGHT: 62 IN

## 2024-12-19 DIAGNOSIS — Z12.31 ENCOUNTER FOR SCREENING MAMMOGRAM FOR BREAST CANCER: ICD-10-CM

## 2024-12-19 PROCEDURE — 77067 SCR MAMMO BI INCL CAD: CPT | Mod: TC

## 2025-02-07 ENCOUNTER — TELEPHONE (OUTPATIENT)
Dept: PRIMARY CARE CLINIC | Facility: CLINIC | Age: 45
End: 2025-02-07
Payer: COMMERCIAL

## 2025-02-14 ENCOUNTER — LAB VISIT (OUTPATIENT)
Dept: LAB | Facility: HOSPITAL | Age: 45
End: 2025-02-14
Payer: COMMERCIAL

## 2025-02-14 ENCOUNTER — OFFICE VISIT (OUTPATIENT)
Dept: PRIMARY CARE CLINIC | Facility: CLINIC | Age: 45
End: 2025-02-14
Payer: COMMERCIAL

## 2025-02-14 VITALS
WEIGHT: 201.94 LBS | SYSTOLIC BLOOD PRESSURE: 120 MMHG | HEIGHT: 62 IN | RESPIRATION RATE: 14 BRPM | DIASTOLIC BLOOD PRESSURE: 66 MMHG | BODY MASS INDEX: 37.16 KG/M2 | OXYGEN SATURATION: 98 %

## 2025-02-14 DIAGNOSIS — F41.9 ANXIETY: Primary | ICD-10-CM

## 2025-02-14 DIAGNOSIS — R41.840 DISTURBED CONCENTRATION: ICD-10-CM

## 2025-02-14 LAB
TSH SERPL DL<=0.005 MIU/L-ACNC: 1.36 UIU/ML (ref 0.4–4)
VIT B12 SERPL-MCNC: 325 PG/ML (ref 210–950)

## 2025-02-14 PROCEDURE — 84443 ASSAY THYROID STIM HORMONE: CPT

## 2025-02-14 PROCEDURE — 82607 VITAMIN B-12: CPT

## 2025-02-14 PROCEDURE — 36415 COLL VENOUS BLD VENIPUNCTURE: CPT | Mod: PN

## 2025-02-14 PROCEDURE — 99999 PR PBB SHADOW E&M-EST. PATIENT-LVL III: CPT | Mod: PBBFAC,,,

## 2025-02-14 RX ORDER — HYDROXYZINE HYDROCHLORIDE 10 MG/1
10 TABLET, FILM COATED ORAL 3 TIMES DAILY PRN
Qty: 30 TABLET | Refills: 0 | Status: SHIPPED | OUTPATIENT
Start: 2025-02-14 | End: 2025-02-24

## 2025-02-14 NOTE — PROGRESS NOTES
"  [facility] Progress Note  2/14/2025          Reason for Visit:      had no chief complaint listed for this encounter.       History of Present Illness:       Health Maintenance Due   Topic Date Due    COVID-19 Vaccine (4 - 2024-25 season) 09/01/2024     History of Present Illness    CHIEF COMPLAINT:  Patient presents today for brain fog and eye twitching.    NEUROLOGICAL SYMPTOMS:  She experiences severe brain fog that occurs at a predictable time each day, particularly noticeable while driving home. She describes feeling "out of it" and unable to focus, comparing it to a headache with tension. Symptoms persist until after completing her evening routine of showering, eating dinner, and getting into bed. She also reports left eye twitching first noticed while watching television, accompanied by lip tingling and hypersensitivity on the side of her head. She has gone to eye doctor and was told everything is ok.     SLEEP:  She reports intermittent sleep disruption, with 45-60 minute periods of wakefulness during the night.      CHEST PAIN:  She reports nagging muscular chest pain, for which she has previously sought medical attention and cardiac clearance.    DIET AND LIFESTYLE:  She acknowledges irregular eating patterns throughout the day and recent weight gain. She has discontinued alcohol and coffee consumption. Her schedule is busy managing activities for twin daughters who play volleyball.    GASTROINTESTINAL:  She reports regular bowel movements without diarrhea, though notes increased gas recently.    SKIN:  She reports generalized dry skin on nasal bridge             Assessment & Plan    IMPRESSION:  - Suspected thyroid issue as potential cause of patient's symptoms; ordered thyroid function tests  - Considered vitamin B12 deficiency as possible cause of eye twitching; ordered B12 level  - Assessed patient's symptoms as likely related to elevated anxiety and stress  - Ruled out ADHD as " cause of concentration issues  - Noted normal kidney and liver function from previous tests  - Decided on as-needed anxiolytic treatment with hydroxyzine    ANXIETY AND STRESS:  - Assessed the patient's condition and determined that mental fogginess and concentration issues may be due to racing thoughts from anxiety.  - Recommend implementing stress management techniques.  - Prescribed hydroxyzine 10 mg as needed, up to 3 times daily   - Assessed that mental fogginess and physical tiredness can be contributed to due to stress and anxiety  - follow up in 4 weeks for anxiety med follow up     CONCENTRATION ISSUES/MENTAL FOG  - follow up tsh and vitamin b12  - possibly related to anxiety as well, if labs normal and peristent after anxiolytic tx consider neuropsych eval     LABS:  - Ordered labs, including thyroid and vitamin B12 checks, to rule out other causes.    FOLLOW UP:  - Scheduled a follow-up visit in 4 weeks to assess the effectiveness of the treatment.  - Patient to practice self-care, including adequate sleep and proper nutrition.            Problem List:     Patient Active Problem List   Diagnosis    Obesity (BMI 30.0-34.9)    Anxiety    Disturbed concentration         Medications:       Current Outpatient Medications:     celecoxib (CELEBREX) 200 MG capsule, TAKE 1 CAPSULE(200 MG) BY MOUTH TWICE DAILY, Disp: 180 capsule, Rfl: 1    ibuprofen (ADVIL,MOTRIN) 800 MG tablet, TAKE 1 TABLET(800 MG) BY MOUTH THREE TIMES DAILY AS NEEDED FOR PAIN, Disp: 60 tablet, Rfl: 1    hydrOXYzine HCL (ATARAX) 10 MG Tab, Take 1 tablet (10 mg total) by mouth 3 (three) times daily as needed (anxiety)., Disp: 30 tablet, Rfl: 0        Review of Systems:     Review of Systems   Constitutional:  Positive for unexpected weight change (wt gain). Negative for chills and fever.   Respiratory:  Positive for chest tightness. Negative for shortness of breath.    Cardiovascular:  Negative for chest pain.   Gastrointestinal:  Negative for  "constipation, diarrhea, nausea and vomiting.   Neurological:  Positive for memory loss (memory fog).   Psychiatric/Behavioral:  Positive for decreased concentration and sleep disturbance. The patient is nervous/anxious.            Physical Exam:     Temp:             Blood Pressure:  120/66        Pulse:         Respirations:  14  Weight:   91.6 kg (201 lb 15.1 oz)  Height:   5' 2" (1.575 m)  BMI:     Body mass index is 36.94 kg/m².    Physical Exam  Constitutional:       General: She is not in acute distress.     Appearance: Normal appearance. She is not ill-appearing, toxic-appearing or diaphoretic.   Cardiovascular:      Rate and Rhythm: Normal rate and regular rhythm.      Pulses: Normal pulses.      Heart sounds: Normal heart sounds. No murmur heard.  Pulmonary:      Effort: Pulmonary effort is normal. No respiratory distress.      Breath sounds: Normal breath sounds. No rhonchi.   Chest:      Chest wall: No tenderness.   Neurological:      General: No focal deficit present.      Mental Status: She is alert and oriented to person, place, and time. Mental status is at baseline.      Cranial Nerves: No cranial nerve deficit.      Motor: No weakness.      Gait: Gait normal.   Psychiatric:      Comments: +anxious          Physical Exam             Labs/Imaging/Testing:     Lab Results   Component Value Date    WBC 4.08 05/30/2024    HGB 12.4 05/30/2024    HCT 39.6 05/30/2024    MCV 96 05/30/2024     05/30/2024        CMP  Sodium   Date Value Ref Range Status   05/30/2024 138 136 - 145 mmol/L Final     Potassium   Date Value Ref Range Status   05/30/2024 4.0 3.5 - 5.1 mmol/L Final     Chloride   Date Value Ref Range Status   05/30/2024 108 95 - 110 mmol/L Final     CO2   Date Value Ref Range Status   05/30/2024 23 23 - 29 mmol/L Final     Glucose   Date Value Ref Range Status   05/30/2024 80 70 - 110 mg/dL Final     BUN   Date Value Ref Range Status   05/30/2024 9 6 - 20 mg/dL Final     Creatinine   Date " Value Ref Range Status   05/30/2024 0.9 0.5 - 1.4 mg/dL Final     Calcium   Date Value Ref Range Status   05/30/2024 9.7 8.7 - 10.5 mg/dL Final     Total Protein   Date Value Ref Range Status   05/30/2024 7.6 6.0 - 8.4 g/dL Final     Albumin   Date Value Ref Range Status   05/30/2024 3.9 3.5 - 5.2 g/dL Final     Total Bilirubin   Date Value Ref Range Status   05/30/2024 0.4 0.1 - 1.0 mg/dL Final     Comment:     For infants and newborns, interpretation of results should be based  on gestational age, weight and in agreement with clinical  observations.    Premature Infant recommended reference ranges:  Up to 24 hours.............<8.0 mg/dL  Up to 48 hours............<12.0 mg/dL  3-5 days..................<15.0 mg/dL  6-29 days.................<15.0 mg/dL       Alkaline Phosphatase   Date Value Ref Range Status   05/30/2024 68 55 - 135 U/L Final     AST   Date Value Ref Range Status   05/30/2024 19 10 - 40 U/L Final     ALT   Date Value Ref Range Status   05/30/2024 14 10 - 44 U/L Final     Anion Gap   Date Value Ref Range Status   05/30/2024 7 (L) 8 - 16 mmol/L Final     eGFR   Date Value Ref Range Status   05/30/2024 >60.0 >60 mL/min/1.73 m^2 Final       Lab Results   Component Value Date    TSH 1.194 04/28/2022       Lab Results   Component Value Date    HGBA1C 5.4 05/30/2024       Lab Results   Component Value Date    CHOL 184 05/30/2024    CHOL 185 04/28/2022    CHOL 187 11/25/2020     Lab Results   Component Value Date    HDL 59 05/30/2024    HDL 76 (H) 04/28/2022    HDL 76 (H) 11/25/2020     Lab Results   Component Value Date    LDLCALC 112.8 05/30/2024    LDLCALC 96.6 04/28/2022    LDLCALC 99.0 11/25/2020     Lab Results   Component Value Date    TRIG 61 05/30/2024    TRIG 62 04/28/2022    TRIG 60 11/25/2020       Lab Results   Component Value Date    CHOLHDL 32.1 05/30/2024    CHOLHDL 41.1 04/28/2022    CHOLHDL 40.6 11/25/2020               Assessment and Plan:         1. Anxiety  -     hydrOXYzine HCL  (ATARAX) 10 MG Tab; Take 1 tablet (10 mg total) by mouth 3 (three) times daily as needed (anxiety).  Dispense: 30 tablet; Refill: 0    2. Disturbed concentration  -     TSH; Future; Expected date: 02/14/2025  -     VITAMIN B12; Future; Expected date: 02/14/2025           Follow up in about 4 weeks (around 3/14/2025) for anxiety follow up.     Ila Burger MD  [facility]   2/14/2025    This note was generated with the assistance of ambient listening technology. Verbal consent was obtained by the patient and accompanying visitor(s) for the recording of patient appointment to facilitate this note. I attest to having reviewed and edited the generated note for accuracy, though some syntax or spelling errors may persist. Please contact the author of this note for any clarification.       Thank you for the opportunity to care for you. We strive to always provide excellent care. Please complete a survey if you receive one and let us know how we are doing    This note was prepared using voice-recognition software.  Although efforts are made to proofread the note, some errors may persist in the final document.

## 2025-02-18 ENCOUNTER — RESULTS FOLLOW-UP (OUTPATIENT)
Dept: PRIMARY CARE CLINIC | Facility: CLINIC | Age: 45
End: 2025-02-18

## 2025-03-03 ENCOUNTER — OFFICE VISIT (OUTPATIENT)
Dept: OBSTETRICS AND GYNECOLOGY | Facility: CLINIC | Age: 45
End: 2025-03-03
Payer: COMMERCIAL

## 2025-03-03 VITALS
WEIGHT: 204.13 LBS | BODY MASS INDEX: 37.34 KG/M2 | HEART RATE: 78 BPM | DIASTOLIC BLOOD PRESSURE: 78 MMHG | SYSTOLIC BLOOD PRESSURE: 116 MMHG

## 2025-03-03 DIAGNOSIS — Z01.419 WOMEN'S ANNUAL ROUTINE GYNECOLOGICAL EXAMINATION: Primary | ICD-10-CM

## 2025-03-03 PROCEDURE — 1159F MED LIST DOCD IN RCRD: CPT | Mod: CPTII,S$GLB,, | Performed by: ADVANCED PRACTICE MIDWIFE

## 2025-03-03 PROCEDURE — 99999 PR PBB SHADOW E&M-EST. PATIENT-LVL III: CPT | Mod: PBBFAC,,, | Performed by: ADVANCED PRACTICE MIDWIFE

## 2025-03-03 PROCEDURE — 3008F BODY MASS INDEX DOCD: CPT | Mod: CPTII,S$GLB,, | Performed by: ADVANCED PRACTICE MIDWIFE

## 2025-03-03 PROCEDURE — 3078F DIAST BP <80 MM HG: CPT | Mod: CPTII,S$GLB,, | Performed by: ADVANCED PRACTICE MIDWIFE

## 2025-03-03 PROCEDURE — 99396 PREV VISIT EST AGE 40-64: CPT | Mod: S$GLB,,, | Performed by: ADVANCED PRACTICE MIDWIFE

## 2025-03-03 PROCEDURE — 3074F SYST BP LT 130 MM HG: CPT | Mod: CPTII,S$GLB,, | Performed by: ADVANCED PRACTICE MIDWIFE

## 2025-03-03 NOTE — PROGRESS NOTES
HISTORY OF PRESENT ILLNESS:    Tato Godinez is a 44 y.o. female, , Patient's last menstrual period was 2025.,  presents for a routine annual exam . Pt has no complaints. Pt is currently not sexually active and does not desire a BCM. Pt reports her cycles are regular, monthly. Pt had a normal Pap 2022- desires to wait until Nov this year for next pap due to insurance concerns. Pt had a normal mammogram in 2024.Pt still pursuing breast reduction surgery- reports excluded per her insurance.     Past Medical History:   Diagnosis Date    Acute labyrinthitis, unspecified laterality 2018    Acute pain of right knee 2023    Right shoulder pain 2022       Past Surgical History:   Procedure Laterality Date    ARTHROSCOPY OF KNEE Right 2023    Procedure: ARTHROSCOPY, KNEE;  Surgeon: Lamine Early MD;  Location: [facility] OR;  Service: Orthopedics;  Laterality: Right;     SECTION      HAND SURGERY      REPAIR OF LIGAMENT Right 2023    Procedure: REPAIR, LIGAMENT;  Surgeon: Lamine Early MD;  Location: [facility] OR;  Service: Orthopedics;  Laterality: Right;       MEDICATIONS AND ALLERGIES:    Current Medications[1]    Review of patient's allergies indicates:  No Known Allergies    Family History   Problem Relation Name Age of Onset    Diabetes Maternal Grandmother      Cancer Maternal Grandmother      Colon cancer Maternal Uncle      Breast cancer Neg Hx      Ovarian cancer Neg Hx         Social History[2]    COMPREHENSIVE GYN HISTORY:  PAP History: Denies abnormal Paps.  Infection History: Denies STDs. Denies PID.  Benign History: Denies uterine fibroids. Denies ovarian cysts. Denies endometriosis. Denies other conditions.  Cancer History: Denies cervical cancer. Denies uterine cancer or hyperplasia. Denies ovarian cancer. Denies vulvar cancer or pre-cancer. Denies vaginal cancer or pre-cancer. Denies breast cancer. Denies colon cancer.  Sexual Activity History:   currently not  sexually active  Menstrual History: monthly  Dysmenorrhea History:none  Contraception: None at this time    ROS:  GENERAL: No weight changes. No swelling. No fatigue. No fever.  CARDIOVASCULAR: No chest pain. No shortness of breath. No leg cramps.   NEUROLOGICAL: No headaches. No vision changes.  BREASTS: No pain. No lumps. No discharge.Reports size cause back pain  ABDOMEN: No pain. No nausea. No vomiting. No diarrhea. No constipation.  REPRODUCTIVE: No abnormal bleeding.   VULVA: No pain. No lesions. No itching.  VAGINA: No relaxation. No itching. No odor. No discharge. No lesions.  URINARY: No incontinence. No nocturia. No frequency. No dysuria.    /78 (BP Location: Left arm, Patient Position: Sitting)   Pulse 78   Wt 92.6 kg (204 lb 2.3 oz)   LMP 02/27/2025   BMI 37.34 kg/m²     PE:  APPEARANCE: Well nourished, well developed, in no acute distress.  AFFECT: WNL, alert and oriented x 3. Interactive during exam  SKIN: No acne or hirsutism.  NECK: Neck symmetric, without masses or thyromegaly.  NODES: No inguinal, axillary or femoral lymph node enlargement.  CHEST: Good respiratory effort.   ABDOMEN: Soft. No tenderness or masses. No hepatosplenomegaly. No hernias.  BREASTS: Pendulous, Symmetrical, no skin changes, visible lesions, palpable masses or nipple discharge bilaterally.  PELVIC: External female genitalia without lesions.  Female hair distribution. Adequate perineal body, Normal urethral meatus. Vagina moist and well rugated without lesions or discharge.  No significant cystocele or rectocele present. pinkUterus is 4-6 week size, regular, mobile and nontender. Adnexa without masses or tenderness.  EXTREMITIES: No edema    PROCEDURES:      DIAGNOSIS:  1. Gyn exam    LABS AND TESTS ORDERED:None    MEDICATIONS PRESCRIBED:None    COUNSELING:  The patient was counseled today on:  -A.C.S. Pap and pelvic exam guidelines, self breast exams and to follow up with her PCP for other health  maintenance.    FOLLOW-UP -PRN       [1]   Current Outpatient Medications:     celecoxib (CELEBREX) 200 MG capsule, TAKE 1 CAPSULE(200 MG) BY MOUTH TWICE DAILY, Disp: 180 capsule, Rfl: 1    ibuprofen (ADVIL,MOTRIN) 800 MG tablet, TAKE 1 TABLET(800 MG) BY MOUTH THREE TIMES DAILY AS NEEDED FOR PAIN, Disp: 60 tablet, Rfl: 1  [2]   Social History  Socioeconomic History    Marital status:    Tobacco Use    Smoking status: Never    Smokeless tobacco: Never   Substance and Sexual Activity    Alcohol use: Yes     Comment: socially    Drug use: No    Sexual activity: Yes     Partners: Male     Birth control/protection: Condom     Social Drivers of Health     Financial Resource Strain: Low Risk  (2/14/2025)    Overall Financial Resource Strain (CARDIA)     Difficulty of Paying Living Expenses: Not hard at all   Food Insecurity: No Food Insecurity (2/14/2025)    Hunger Vital Sign     Worried About Running Out of Food in the Last Year: Never true     Ran Out of Food in the Last Year: Never true   Transportation Needs: No Transportation Needs (2/14/2025)    PRAPARE - Transportation     Lack of Transportation (Medical): No     Lack of Transportation (Non-Medical): No   Physical Activity: Inactive (2/14/2025)    Exercise Vital Sign     Days of Exercise per Week: 0 days     Minutes of Exercise per Session: 0 min   Stress: Stress Concern Present (2/14/2025)    [employer] - Occupational Stress Questionnaire     Feeling of Stress : To some extent   Housing Stability: Low Risk  (2/14/2025)    Housing Stability Vital Sign     Unable to Pay for Housing in the Last Year: No     Number of Times Moved in the Last Year: 0     Homeless in the Last Year: No

## 2025-03-17 ENCOUNTER — PATIENT MESSAGE (OUTPATIENT)
Dept: OBSTETRICS AND GYNECOLOGY | Facility: CLINIC | Age: 45
End: 2025-03-17
Payer: COMMERCIAL

## 2025-03-19 ENCOUNTER — TELEPHONE (OUTPATIENT)
Dept: PRIMARY CARE CLINIC | Facility: CLINIC | Age: 45
End: 2025-03-19

## 2025-03-19 ENCOUNTER — OFFICE VISIT (OUTPATIENT)
Dept: PRIMARY CARE CLINIC | Facility: CLINIC | Age: 45
End: 2025-03-19
Payer: COMMERCIAL

## 2025-03-19 VITALS
BODY MASS INDEX: 37.85 KG/M2 | HEIGHT: 62 IN | WEIGHT: 205.69 LBS | SYSTOLIC BLOOD PRESSURE: 112 MMHG | DIASTOLIC BLOOD PRESSURE: 66 MMHG

## 2025-03-19 DIAGNOSIS — F41.8 SITUATIONAL ANXIETY: Primary | ICD-10-CM

## 2025-03-19 DIAGNOSIS — E66.9 OBESITY (BMI 30-39.9): ICD-10-CM

## 2025-03-19 DIAGNOSIS — G24.5 BLEPHAROSPASM OF LEFT EYE: ICD-10-CM

## 2025-03-19 DIAGNOSIS — Z00.00 PREVENTATIVE HEALTH CARE: ICD-10-CM

## 2025-03-19 PROBLEM — R41.840 DISTURBED CONCENTRATION: Status: RESOLVED | Noted: 2025-02-14 | Resolved: 2025-03-19

## 2025-03-19 PROBLEM — E66.811 OBESITY (BMI 30.0-34.9): Status: RESOLVED | Noted: 2024-05-30 | Resolved: 2025-03-19

## 2025-03-19 PROCEDURE — 3074F SYST BP LT 130 MM HG: CPT | Mod: CPTII,S$GLB,, | Performed by: INTERNAL MEDICINE

## 2025-03-19 PROCEDURE — G2211 COMPLEX E/M VISIT ADD ON: HCPCS | Mod: S$GLB,,, | Performed by: INTERNAL MEDICINE

## 2025-03-19 PROCEDURE — 99215 OFFICE O/P EST HI 40 MIN: CPT | Mod: S$GLB,,, | Performed by: INTERNAL MEDICINE

## 2025-03-19 PROCEDURE — 3078F DIAST BP <80 MM HG: CPT | Mod: CPTII,S$GLB,, | Performed by: INTERNAL MEDICINE

## 2025-03-19 PROCEDURE — 99999 PR PBB SHADOW E&M-EST. PATIENT-LVL III: CPT | Mod: PBBFAC,,, | Performed by: INTERNAL MEDICINE

## 2025-03-19 PROCEDURE — 3008F BODY MASS INDEX DOCD: CPT | Mod: CPTII,S$GLB,, | Performed by: INTERNAL MEDICINE

## 2025-03-19 PROCEDURE — 1159F MED LIST DOCD IN RCRD: CPT | Mod: CPTII,S$GLB,, | Performed by: INTERNAL MEDICINE

## 2025-03-19 RX ORDER — MAGNESIUM GLYCINATE 100 MG
400 TABLET ORAL ONCE
Qty: 120 TABLET | Refills: 3 | Status: SHIPPED | OUTPATIENT
Start: 2025-03-19 | End: 2025-03-19

## 2025-03-19 NOTE — ASSESSMENT & PLAN NOTE
Cont hydroxyzine 10mg tid prn  Start to meditate, journal, exposure to natural sunlight, exercise 30 min 5 times per week

## 2025-03-19 NOTE — PROGRESS NOTES
[facility] Progress Note      Chief Complaint     Follow-up (4 week)   for NIKKI    Subjective:      History of Present Illness:  Tato Godinez is a 44 y.o. female     NIKKI: saw Dr. Wakefield on 2/14/2025 for anxiety and brain fog and was prescribed hydroxyzine 10mg tid prn; had TSH and B12 drawn for disturbed concentration - both normal   Annual labs due 5/2025  -felt happy when started walking again over the past 2 wks    Intermittent Left blepharospasm x 1 year: saw optometrist  who felt it was due to stress and to avoid caffeine - no longer drinks alcohol, caffeine; has occurred when pt did not feel stressed but pt admits that she has been feeling stressed primarily due to unhappy situation with current male friend and self-imposed stress associated with feeling the need to be both parents for her children since ; however happy at work    Obesity: plans to start exercising again since she really loves to exercise but was unable to do so after she tore her right MCL; has been walking 2 miles twice per week without right knee pain    S/p right MCL tear  repair in June 2023 by Dr. 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  Celibate x 6 mo with neg STDs; in a relationship x 2 years  FH: mom HTN: dad DM II; has a twin sister that recently moved to [facility]; has another sister      Past Medical History:  Past Medical History:   Diagnosis Date    Acute labyrinthitis, unspecified laterality 02/19/2018    Acute pain of right knee 06/30/2023    Disturbed concentration 02/14/2025    Obesity (BMI 30.0-34.9) 05/30/2024    Right  "shoulder pain 2022       Past Surgical History:  Past Surgical History:   Procedure Laterality Date    ARTHROSCOPY OF KNEE Right 2023    Procedure: ARTHROSCOPY, KNEE;  Surgeon: Lamine Early MD;  Location: [facility] OR;  Service: Orthopedics;  Laterality: Right;     SECTION      HAND SURGERY      REPAIR OF LIGAMENT Right 2023    Procedure: REPAIR, LIGAMENT;  Surgeon: Lamine Early MD;  Location: [facility] OR;  Service: Orthopedics;  Laterality: Right;       Allergies:  Review of patient's allergies indicates:  No Known Allergies    Home Medications:  Current Medications[1]     Family History:  Family History   Problem Relation Name Age of Onset    Diabetes Maternal Grandmother      Cancer Maternal Grandmother      Colon cancer Maternal Uncle      Breast cancer Neg Hx      Ovarian cancer Neg Hx         Social History:  Social History[2]    Review of Systems:  Pertinent positives and negatives listed in HPI. All other systems are reviewed and are negative.    Health Maintaince :   Health Maintenance Topics with due status: Not Due       Topic Last Completion Date    TETANUS VACCINE 2022    Cervical Cancer Screening 2022    Hemoglobin A1c (Diabetic Prevention Screening) 2024    Mammogram 2024    RSV Vaccine (Age 60+ and Pregnant patients) Not Due           Eye: UTD  Dental:  UTD     Immunizations:   Tdap: .  Influenza: UTD.  COVID: needs  Hepatitis C:   Cancer Screening:  PAP: UTD with Dr. Rainey   Mammogram: UTD 2024    The 10-year ASCVD risk score (Jay LOYA, et al., 2019) is: 0.3%    Values used to calculate the score:      Age: 44 years      Sex: Female      Is Non- : Yes      Diabetic: No      Tobacco smoker: No      Systolic Blood Pressure: 112 mmHg      Is BP treated: No      HDL Cholesterol: 59 mg/dL      Total Cholesterol: 184 mg/dL      Objective:   /66 (BP Location: Left arm, Patient Position: Sitting)   Ht 5' 2" (1.575 m)  "  Wt 93.3 kg (205 lb 11 oz)   LMP 02/27/2025 (Exact Date)   BMI 37.62 kg/m²      Body mass index is 37.62 kg/m².       Physical Examination:  General: Alert and awake in no apparent distress  HEENT: Normocephalic and atraumatic; Tms WNL  Eyes:  PERRL; EOMi with anicteric sclera and clear conjunctivae  Mouth:  Oropharynx clear and without exudate; moist mucous membranes  Neck:   Cervical nodes not enlarged (No submental, submandibular, preauricular, posterior auricular or occipital adenopathy); supple; no bruits  Cardio:  Regular rate and rhythm with normal S1 and S2; no murmurs or rubs  Resp:  CTAB; respirations unlabored; no wheezes, crackles or rhonchi  Abdom: Soft, NTND with normoactive bowel sounds; negative HSM  Extrem: Warm and well-perfused with no clubbing, cyanosis or edema  Skin:  No rashes, lesions, or color changes  Pulses:  2+ and symmetric distally  Neuro:  AAOx3; cooperative and pleasant with no focal deficits    Laboratory:      Most Recent Data:  Lab Results   Component Value Date    WBC 4.08 05/30/2024    HGB 12.4 05/30/2024    HCT 39.6 05/30/2024     05/30/2024    CHOL 184 05/30/2024    TRIG 61 05/30/2024    HDL 59 05/30/2024    ALT 14 05/30/2024    AST 19 05/30/2024     05/30/2024    K 4.0 05/30/2024     05/30/2024    BUN 9 05/30/2024    CO2 23 05/30/2024    TSH 1.358 02/14/2025    HGBA1C 5.4 05/30/2024              CBC:   WBC   Date Value Ref Range Status   05/30/2024 4.08 3.90 - 12.70 K/uL Final     Hemoglobin   Date Value Ref Range Status   05/30/2024 12.4 12.0 - 16.0 g/dL Final     Hematocrit   Date Value Ref Range Status   05/30/2024 39.6 37.0 - 48.5 % Final     Platelets   Date Value Ref Range Status   05/30/2024 366 150 - 450 K/uL Final     MCV   Date Value Ref Range Status   05/30/2024 96 82 - 98 fL Final     RDW   Date Value Ref Range Status   05/30/2024 14.0 11.5 - 14.5 % Final     BMP:   Sodium   Date Value Ref Range Status   05/30/2024 138 136 - 145 mmol/L Final  "    Potassium   Date Value Ref Range Status   05/30/2024 4.0 3.5 - 5.1 mmol/L Final     Chloride   Date Value Ref Range Status   05/30/2024 108 95 - 110 mmol/L Final     CO2   Date Value Ref Range Status   05/30/2024 23 23 - 29 mmol/L Final     BUN   Date Value Ref Range Status   05/30/2024 9 6 - 20 mg/dL Final     Creatinine   Date Value Ref Range Status   05/30/2024 0.9 0.5 - 1.4 mg/dL Final     Glucose   Date Value Ref Range Status   05/30/2024 80 70 - 110 mg/dL Final     Calcium   Date Value Ref Range Status   05/30/2024 9.7 8.7 - 10.5 mg/dL Final     Magnesium   Date Value Ref Range Status   05/30/2024 2.0 1.6 - 2.6 mg/dL Final     LFTs:   Total Protein   Date Value Ref Range Status   05/30/2024 7.6 6.0 - 8.4 g/dL Final     Albumin   Date Value Ref Range Status   05/30/2024 3.9 3.5 - 5.2 g/dL Final     Total Bilirubin   Date Value Ref Range Status   05/30/2024 0.4 0.1 - 1.0 mg/dL Final     Comment:     For infants and newborns, interpretation of results should be based  on gestational age, weight and in agreement with clinical  observations.    Premature Infant recommended reference ranges:  Up to 24 hours.............<8.0 mg/dL  Up to 48 hours............<12.0 mg/dL  3-5 days..................<15.0 mg/dL  6-29 days.................<15.0 mg/dL       AST   Date Value Ref Range Status   05/30/2024 19 10 - 40 U/L Final     Alkaline Phosphatase   Date Value Ref Range Status   05/30/2024 68 55 - 135 U/L Final     ALT   Date Value Ref Range Status   05/30/2024 14 10 - 44 U/L Final     Coags: No results found for: "INR", "PROTIME", "PTT"  FLP:      Lab Results   Component Value Date    CHOL 184 05/30/2024    CHOL 185 04/28/2022    CHOL 187 11/25/2020     Lab Results   Component Value Date    HDL 59 05/30/2024    HDL 76 (H) 04/28/2022    HDL 76 (H) 11/25/2020     Lab Results   Component Value Date    LDLCALC 112.8 05/30/2024    LDLCALC 96.6 04/28/2022    LDLCALC 99.0 11/25/2020     Lab Results   Component Value Date    " TRIG 61 05/30/2024    TRIG 62 04/28/2022    TRIG 60 11/25/2020     Lab Results   Component Value Date    CHOLHDL 32.1 05/30/2024    CHOLHDL 41.1 04/28/2022    CHOLHDL 40.6 11/25/2020      DM:      Hemoglobin A1C   Date Value Ref Range Status   05/30/2024 5.4 4.0 - 5.6 % Final     Comment:     ADA Screening Guidelines:  5.7-6.4%  Consistent with prediabetes  >or=6.5%  Consistent with diabetes    High levels of fetal hemoglobin interfere with the HbA1C  assay. Heterozygous hemoglobin variants (HbS, HgC, etc)do  not significantly interfere with this assay.   However, presence of multiple variants may affect accuracy.     04/28/2022 5.3 4.0 - 5.6 % Final     Comment:     ADA Screening Guidelines:  5.7-6.4%  Consistent with prediabetes  >or=6.5%  Consistent with diabetes    High levels of fetal hemoglobin interfere with the HbA1C  assay. Heterozygous hemoglobin variants (HbS, HgC, etc)do  not significantly interfere with this assay.   However, presence of multiple variants may affect accuracy.     11/25/2020 5.3 4.0 - 5.6 % Final     Comment:     ADA Screening Guidelines:  5.7-6.4%  Consistent with prediabetes  >or=6.5%  Consistent with diabetes  High levels of fetal hemoglobin interfere with the HbA1C  assay. Heterozygous hemoglobin variants (HbS, HgC, etc)do  not significantly interfere with this assay.   However, presence of multiple variants may affect accuracy.       LDL Cholesterol   Date Value Ref Range Status   05/30/2024 112.8 63.0 - 159.0 mg/dL Final     Comment:     The National Cholesterol Education Program (NCEP) has set the  following guidelines (reference values) for LDL Cholesterol:  Optimal.......................<130 mg/dL  Borderline High...............130-159 mg/dL  High..........................160-189 mg/dL  Very High.....................>190 mg/dL       Creatinine   Date Value Ref Range Status   05/30/2024 0.9 0.5 - 1.4 mg/dL Final     Thyroid:   TSH   Date Value Ref Range Status   02/14/2025 1.358  "0.400 - 4.000 uIU/mL Final     Anemia:   Vitamin B-12   Date Value Ref Range Status   02/14/2025 325 210 - 950 pg/mL Final     Cardiac: No results found for: "TROPONINI", "CKTOTAL", "CKMB", "BNP"  Urinalysis:   Color, UA   Date Value Ref Range Status   05/30/2024 Yellow Yellow, Straw, Alexa Final     Specific Gravity, UA   Date Value Ref Range Status   05/30/2024 1.020 1.005 - 1.030 Final     Nitrite, UA   Date Value Ref Range Status   05/30/2024 Negative Negative Final     Ketones, UA   Date Value Ref Range Status   05/30/2024 Negative Negative Final     WBC, UA   Date Value Ref Range Status   05/30/2024 3 0 - 5 /hpf Final       Other Results:  EKG (my interpretation):     Radiology:  Mammo Digital Screening Bilat w/ Alex  Narrative: Facility:  [facility] [address] [place] [address]    Name: Tato Godinez    MRN: [id number]    Result:  Mammo Digital Screening Bilat w/ Alex    History:  Patient is 44 y.o. and is seen for a screening mammogram.    Films Compared:  Compared to: 08/07/2024 Mammo Digital Diagnostic Left with Alex,   11/24/2023 Mammo Digital Screening Bilat w/ Alex, and 11/22/2022 Mammo   Digital Screening Bilat w/ Alex     Findings:  This procedure was performed using tomosynthesis.   Computer-aided detection was utilized in the interpretation of this   examination.    There are scattered areas of fibroglandular density. There is no evidence   of suspicious masses, microcalcifications or architectural distortion.   Findings are stable.   Impression:    No mammographic evidence of malignancy.    BI-RADS Category 1: Negative    Recommendation:  Routine screening mammogram in 1 year is recommended.    Your estimated lifetime risk of breast cancer (to age 85) based on   Tyrer-Cuzick risk assessment model is 5.51%.  According to the American   Cancer Society, patients with a lifetime breast cancer risk of 20% or   higher might benefit from supplemental screening " tests, such as screening   breast MRI.    Claus Nieto MD          Assessment/Plan     Tato Godinez is a 44 y.o. female with:  1. Situational anxiety  Assessment & Plan:  Cont hydroxyzine 10mg tid prn  Start to meditate, journal, exposure to natural sunlight, exercise 30 min 5 times per week       2. Obesity (BMI 30-39.9)  Assessment & Plan:  Exercise 30 min 5 times per week, decrease portion sizes by 50%; encourage plant-based diet;  drink 6-8 glasses of water daily, avoid fast foods, creamy, rich foods soledad ice cream, cheese creamy salad dressings;avoid eating 3 hours prior to bedtime; consider 8-10 hour intermittent diet; avoid simple sugars soledad white bread, rice, potatoes, noodles/pasta; no sweetened drinks.    Reduce alcohol to max 1-2 drinks per day (1 drink = 12 beer;  1 oz hard liquor; 5 oz wine)    Limit eating at restaurants to once per week; avoid fast foods, junk foods, impulsive eating. Drink 1 glass of lukewarm water before eating. Chew 100 times before swallowing food.     Use the Lose It Kenia to track calories and aim for eating less than 1200 calories per day.        3. Preventative health care  Assessment & Plan:  Labs in May 2025  Get COVID vaccine    Orders:  -     Lipid Panel; Future; Expected date: 03/19/2025  -     Hemoglobin A1C; Future; Expected date: 03/19/2025  -     Urinalysis; Future; Expected date: 03/19/2025  -     Comprehensive Metabolic Panel; Future; Expected date: 03/19/2025  -     CBC Auto Differential; Future; Expected date: 03/19/2025    4. Blepharospasm of left eye  Assessment & Plan:  Likely due to stress  Start mag glycinate 400mg qhs to help relax  Increase exercise 30 min 5 times per week, meditate, journal, relax       Other orders  -     magnesium glycinate (MAG GLYCINATE) 100 mg Tab; Take 400 mg by mouth once. for 1 dose  Dispense: 120 tablet; Refill: 3             I spent 46 min with this includes face to face time and non-face to face time preparing to see  the patient (eg, review of tests), obtaining and/or reviewing separately obtained history, documenting clinical information in the electronic or other health record, independently interpreting results and communicating results to the patient/family/caregiver, or care coordinator.   Code Status:     Florence De La Torre MD          [1]   Current Outpatient Medications   Medication Sig Dispense Refill    celecoxib (CELEBREX) 200 MG capsule TAKE 1 CAPSULE(200 MG) BY MOUTH TWICE DAILY 180 capsule 1    ibuprofen (ADVIL,MOTRIN) 800 MG tablet TAKE 1 TABLET(800 MG) BY MOUTH THREE TIMES DAILY AS NEEDED FOR PAIN (Patient taking differently: Take 800 mg by mouth as needed.) 60 tablet 1    magnesium glycinate (MAG GLYCINATE) 100 mg Tab Take 400 mg by mouth once. for 1 dose 120 tablet 3     No current facility-administered medications for this visit.   [2]   Social History  Tobacco Use    Smoking status: Never    Smokeless tobacco: Never   Substance Use Topics    Alcohol use: Yes     Comment: socially    Drug use: No

## 2025-03-19 NOTE — ASSESSMENT & PLAN NOTE
Exercise 30 min 5 times per week, decrease portion sizes by 50%; encourage plant-based diet;  drink 6-8 glasses of water daily, avoid fast foods, creamy, rich foods soledad ice cream, cheese creamy salad dressings;avoid eating 3 hours prior to bedtime; consider 8-10 hour intermittent diet; avoid simple sugars soledad white bread, rice, potatoes, noodles/pasta; no sweetened drinks.    Reduce alcohol to max 1-2 drinks per day (1 drink = 12 beer;  1 oz hard liquor; 5 oz wine)    Limit eating at restaurants to once per week; avoid fast foods, junk foods, impulsive eating. Drink 1 glass of lukewarm water before eating. Chew 100 times before swallowing food.     Use the Lose It Kenia to track calories and aim for eating less than 1200 calories per day.

## 2025-03-20 NOTE — ASSESSMENT & PLAN NOTE
Likely due to stress  Start mag glycinate 400mg qhs to help relax  Increase exercise 30 min 5 times per week, medimichaelate, journal, relax

## 2025-03-20 NOTE — TELEPHONE ENCOUNTER
Please schedule annual visit in 5/2025 and schedule fasting labs to be done a few days prior.  Thanks!

## 2025-03-21 ENCOUNTER — TELEPHONE (OUTPATIENT)
Dept: PRIMARY CARE CLINIC | Facility: CLINIC | Age: 45
End: 2025-03-21
Payer: COMMERCIAL

## 2025-03-21 NOTE — TELEPHONE ENCOUNTER
----- Message from Keerthi sent at 3/21/2025  1:02 PM CDT -----  Contact: [facility]   Pharmacy is calling to clarify an RX.RX name:  magnesium glycolateWhat do they need to clarify:  directions Comments: [id number] - [facility] [id number] [facility] [id number] [facility] [address]Ctuto: [phone] Fax: [phone]

## 2025-03-21 NOTE — TELEPHONE ENCOUNTER
Pharmacy called to clarify directions on the magnesium glycinate (MAG GLYCINATE) 100 mg Tab, patient states she is taking the magnesium every night.    Pharmacy expressed understanding and all questions were answered.

## 2025-03-28 ENCOUNTER — PATIENT MESSAGE (OUTPATIENT)
Dept: PRIMARY CARE CLINIC | Facility: CLINIC | Age: 45
End: 2025-03-28
Payer: COMMERCIAL

## 2025-04-10 ENCOUNTER — PATIENT MESSAGE (OUTPATIENT)
Dept: PRIMARY CARE CLINIC | Facility: CLINIC | Age: 45
End: 2025-04-10
Payer: COMMERCIAL

## 2025-06-11 ENCOUNTER — LAB VISIT (OUTPATIENT)
Dept: LAB | Facility: HOSPITAL | Age: 45
End: 2025-06-11
Attending: INTERNAL MEDICINE
Payer: COMMERCIAL

## 2025-06-11 ENCOUNTER — RESULTS FOLLOW-UP (OUTPATIENT)
Dept: PRIMARY CARE CLINIC | Facility: CLINIC | Age: 45
End: 2025-06-11

## 2025-06-11 DIAGNOSIS — F41.8 SITUATIONAL ANXIETY: ICD-10-CM

## 2025-06-11 DIAGNOSIS — Z00.00 PREVENTATIVE HEALTH CARE: ICD-10-CM

## 2025-06-11 LAB
ABSOLUTE EOSINOPHIL (OHS): 0.05 K/UL
ABSOLUTE MONOCYTE (OHS): 0.52 K/UL (ref 0.3–1)
ABSOLUTE NEUTROPHIL COUNT (OHS): 2.06 K/UL (ref 1.8–7.7)
ALBUMIN SERPL BCP-MCNC: 3.9 G/DL (ref 3.5–5.2)
ALP SERPL-CCNC: 54 UNIT/L (ref 40–150)
ALT SERPL W/O P-5'-P-CCNC: 9 UNIT/L (ref 10–44)
ANION GAP (OHS): 6 MMOL/L (ref 8–16)
AST SERPL-CCNC: 15 UNIT/L (ref 11–45)
BACTERIA #/AREA URNS AUTO: ABNORMAL /HPF
BASOPHILS # BLD AUTO: 0.03 K/UL
BASOPHILS NFR BLD AUTO: 0.7 %
BILIRUB SERPL-MCNC: 0.4 MG/DL (ref 0.1–1)
BILIRUB UR QL STRIP.AUTO: NEGATIVE
BUN SERPL-MCNC: 9 MG/DL (ref 6–20)
CALCIUM SERPL-MCNC: 9 MG/DL (ref 8.7–10.5)
CHLORIDE SERPL-SCNC: 107 MMOL/L (ref 95–110)
CHOLEST SERPL-MCNC: 176 MG/DL (ref 120–199)
CHOLEST/HDLC SERPL: 3.5 {RATIO} (ref 2–5)
CLARITY UR: ABNORMAL
CO2 SERPL-SCNC: 23 MMOL/L (ref 23–29)
COLOR UR AUTO: YELLOW
CREAT SERPL-MCNC: 0.9 MG/DL (ref 0.5–1.4)
EAG (OHS): 108 MG/DL (ref 68–131)
ERYTHROCYTE [DISTWIDTH] IN BLOOD BY AUTOMATED COUNT: 13.3 % (ref 11.5–14.5)
GFR SERPLBLD CREATININE-BSD FMLA CKD-EPI: >60 ML/MIN/1.73/M2
GLUCOSE SERPL-MCNC: 84 MG/DL (ref 70–110)
GLUCOSE UR QL STRIP: NEGATIVE
HBA1C MFR BLD: 5.4 % (ref 4–5.6)
HCT VFR BLD AUTO: 38.2 % (ref 37–48.5)
HDLC SERPL-MCNC: 51 MG/DL (ref 40–75)
HDLC SERPL: 29 % (ref 20–50)
HGB BLD-MCNC: 12.1 GM/DL (ref 12–16)
HGB UR QL STRIP: ABNORMAL
IMM GRANULOCYTES # BLD AUTO: 0.01 K/UL (ref 0–0.04)
IMM GRANULOCYTES NFR BLD AUTO: 0.2 % (ref 0–0.5)
KETONES UR QL STRIP: ABNORMAL
LDLC SERPL CALC-MCNC: 114.6 MG/DL (ref 63–159)
LEUKOCYTE ESTERASE UR QL STRIP: ABNORMAL
LYMPHOCYTES # BLD AUTO: 1.8 K/UL (ref 1–4.8)
MCH RBC QN AUTO: 29.7 PG (ref 27–31)
MCHC RBC AUTO-ENTMCNC: 31.7 G/DL (ref 32–36)
MCV RBC AUTO: 94 FL (ref 82–98)
MICROSCOPIC COMMENT: ABNORMAL
NITRITE UR QL STRIP: POSITIVE
NONHDLC SERPL-MCNC: 125 MG/DL
NUCLEATED RBC (/100WBC) (OHS): 0 /100 WBC
PH UR STRIP: 6 [PH]
PLATELET # BLD AUTO: 365 K/UL (ref 150–450)
PMV BLD AUTO: 10.2 FL (ref 9.2–12.9)
POTASSIUM SERPL-SCNC: 4.3 MMOL/L (ref 3.5–5.1)
PROT SERPL-MCNC: 7.4 GM/DL (ref 6–8.4)
PROT UR QL STRIP: ABNORMAL
RBC # BLD AUTO: 4.07 M/UL (ref 4–5.4)
RBC #/AREA URNS AUTO: 2 /HPF (ref 0–4)
RELATIVE EOSINOPHIL (OHS): 1.1 %
RELATIVE LYMPHOCYTE (OHS): 40.3 % (ref 18–48)
RELATIVE MONOCYTE (OHS): 11.6 % (ref 4–15)
RELATIVE NEUTROPHIL (OHS): 46.1 % (ref 38–73)
SODIUM SERPL-SCNC: 136 MMOL/L (ref 136–145)
SP GR UR STRIP: 1.03
SQUAMOUS #/AREA URNS AUTO: 6 /HPF
TRIGL SERPL-MCNC: 52 MG/DL (ref 30–150)
TSH SERPL-ACNC: 1.15 UIU/ML (ref 0.4–4)
UROBILINOGEN UR STRIP-ACNC: NEGATIVE EU/DL
WBC # BLD AUTO: 4.47 K/UL (ref 3.9–12.7)
WBC #/AREA URNS AUTO: 10 /HPF (ref 0–5)

## 2025-06-11 PROCEDURE — 36415 COLL VENOUS BLD VENIPUNCTURE: CPT

## 2025-06-11 PROCEDURE — 82465 ASSAY BLD/SERUM CHOLESTEROL: CPT

## 2025-06-11 PROCEDURE — 85025 COMPLETE CBC W/AUTO DIFF WBC: CPT

## 2025-06-11 PROCEDURE — 81003 URINALYSIS AUTO W/O SCOPE: CPT

## 2025-06-11 PROCEDURE — 84443 ASSAY THYROID STIM HORMONE: CPT

## 2025-06-11 PROCEDURE — 80053 COMPREHEN METABOLIC PANEL: CPT

## 2025-06-11 PROCEDURE — 83036 HEMOGLOBIN GLYCOSYLATED A1C: CPT

## 2025-06-17 NOTE — PROGRESS NOTES
[facility] Progress Note      Chief Complaint     Annual Exam       Subjective:      History of Present Illness:  Tato Godinez is a 44 y.o. female former pt of Dr. Lorenzo at [facility]     Palpitations: has intermittent racing heart without SOB, FLORES, chest pain for which an EKG was done in 6/2023    S/p right MCL tear  repair in June 2023 by Dr. Early - now fully recovered; finished PT at end of 3/2024; plans to join gym    Obesity: plans to start exercising again since she really loves to exercise but was unable to do so after she tore her right MCL  -lost 8 lbs since June 1, 2025 on compounded mounjaro 2.5mg SQ weekly with [facility]en - started probiotics for GERD; no constipation, diarrhea  -exercise walking 150 min weekly     Left eye blepharospasm: resolved with mag glycinate 400mg qhs; likely due to stress    Situational stress: improved with Mag glycinate 400mg po qhs because helps her sleep; uses vistaril 10mg prn severe anxiety; now less stressed since does not working during the summer but trying to raise her twin girls as a single mom since she receives no financial assistance from her ex-    Bilateral CTS s/p right repair  - doing well     SH: has twins C/S now 14 y/o travel volleyball at [employer]; 22 y/o girl;  x 8 years - took him to court and now on disability; now has full custody and gets no monies for him; 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  Celibate x 6 mo with neg STDs; in a relationship x 1 year  FH: mom HTN: dad DM II    Past Medical History:  Past Medical History:   Diagnosis Date    Acute labyrinthitis, unspecified laterality 02/19/2018    Acute pain of right knee 06/30/2023    Disturbed concentration 02/14/2025    Obesity (BMI 30.0-34.9) 05/30/2024    Right shoulder pain 05/04/2022       Past Surgical History:  Past Surgical History:   Procedure Laterality Date    ARTHROSCOPY  OF KNEE Right 2023    Procedure: ARTHROSCOPY, KNEE;  Surgeon: Lamine Early MD;  Location: [facility] OR;  Service: Orthopedics;  Laterality: Right;     SECTION      HAND SURGERY      REPAIR OF LIGAMENT Right 2023    Procedure: REPAIR, LIGAMENT;  Surgeon: Lamine Early MD;  Location: [facility] OR;  Service: Orthopedics;  Laterality: Right;       Allergies:  Review of patient's allergies indicates:  No Known Allergies    Home Medications:  Current Outpatient Medications   Medication Sig Dispense Refill    celecoxib (CELEBREX) 200 MG capsule TAKE 1 CAPSULE(200 MG) BY MOUTH TWICE DAILY 180 capsule 1    hydrOXYzine HCL (ATARAX) 10 MG Tab       ibuprofen (ADVIL,MOTRIN) 800 MG tablet TAKE 1 TABLET(800 MG) BY MOUTH THREE TIMES DAILY AS NEEDED FOR PAIN 60 tablet 1    tirzepatide, weight loss, (ZEPBOUND) 2.5 mg/0.5 mL Soln Inject 2.5 mg into the skin every 7 days.       No current facility-administered medications for this visit.        Family History:  Family History   Problem Relation Name Age of Onset    Diabetes Maternal Grandmother      Cancer Maternal Grandmother      Colon cancer Maternal Uncle      Breast cancer Neg Hx      Ovarian cancer Neg Hx         Social History:  Social History     Tobacco Use    Smoking status: Never    Smokeless tobacco: Never   Substance Use Topics    Alcohol use: Yes     Comment: socially    Drug use: No       Review of Systems:  Pertinent positives and negatives listed in HPI. All other systems are reviewed and are negative.    Health Maintaince :   Health Maintenance Topics with due status: Not Due       Topic Last Completion Date    TETANUS VACCINE 2022    Cervical Cancer Screening 2022    Mammogram 2024    Hemoglobin A1c (Diabetic Prevention Screening) 2025    RSV Vaccine (Age 60+ and Pregnant patients) Not Due           Eye: UTD  Dental: UTD     Immunizations:   Tdap: 3/2022.  COVID: needs  Hepatitis C:   Cancer Screening:  PAP: UTD 2023  "pap UTD  Mammogram: UTD 12/2024    The 10-year ASCVD risk score (Jay LOYA, et al., 2019) is: 0.4%    Values used to calculate the score:      Age: 44 years      Sex: Female      Is Non- : Yes      Diabetic: No      Tobacco smoker: No      Systolic Blood Pressure: 114 mmHg      Is BP treated: No      HDL Cholesterol: 51 mg/dL      Total Cholesterol: 176 mg/dL      Objective:   /68 (BP Location: Right arm, Patient Position: Sitting)   Pulse 79   Resp 14   Ht 5' 2" (1.575 m)   Wt 90.3 kg (199 lb 1.2 oz)   LMP 06/07/2025   SpO2 98%   BMI 36.41 kg/m²      Body mass index is 36.41 kg/m².       Physical Examination:  General: Alert and awake in no apparent distress  HEENT: Normocephalic and atraumatic; Tms WNL  Eyes:  PERRL; EOMi with anicteric sclera and clear conjunctivae  Mouth:  Oropharynx clear and without exudate; moist mucous membranes  Neck:   Cervical nodes not enlarged; supple; no bruits  Cardio:  Regular rate and rhythm with normal S1 and S2; no murmurs or rubs  Resp:  CTAB; respirations unlabored; no wheezes, crackles or rhonchi  Abdom: Soft, NTND with normoactive bowel sounds; negative HSM  Extrem: Warm and well-perfused with no clubbing, cyanosis or edema  Skin:  No rashes, lesions, or color changes  Pulses:  2+ and symmetric distally  Neuro:  AAOx3; cooperative and pleasant with no focal deficits    Laboratory:      Most Recent Data:  Lab Results   Component Value Date    WBC 4.47 06/11/2025    HGB 12.1 06/11/2025    HCT 38.2 06/11/2025     06/11/2025    CHOL 176 06/11/2025    TRIG 52 06/11/2025    HDL 51 06/11/2025    ALT 9 (L) 06/11/2025    AST 15 06/11/2025     06/11/2025    K 4.3 06/11/2025     06/11/2025    BUN 9 06/11/2025    CO2 23 06/11/2025    TSH 1.152 06/11/2025    HGBA1C 5.4 06/11/2025              CBC:   WBC   Date Value Ref Range Status   06/11/2025 4.47 3.90 - 12.70 K/uL Final     Hemoglobin   Date Value Ref Range Status   05/30/2024 " 12.4 12.0 - 16.0 g/dL Final     HGB   Date Value Ref Range Status   06/11/2025 12.1 12.0 - 16.0 gm/dL Final     Hematocrit   Date Value Ref Range Status   05/30/2024 39.6 37.0 - 48.5 % Final     HCT   Date Value Ref Range Status   06/11/2025 38.2 37.0 - 48.5 % Final     Platelet Count   Date Value Ref Range Status   06/11/2025 365 150 - 450 K/uL Final     Platelets   Date Value Ref Range Status   05/30/2024 366 150 - 450 K/uL Final     MCV   Date Value Ref Range Status   06/11/2025 94 82 - 98 fL Final   05/30/2024 96 82 - 98 fL Final     RDW   Date Value Ref Range Status   06/11/2025 13.3 11.5 - 14.5 % Final   05/30/2024 14.0 11.5 - 14.5 % Final     BMP:   Sodium   Date Value Ref Range Status   06/11/2025 136 136 - 145 mmol/L Final   05/30/2024 138 136 - 145 mmol/L Final     Potassium   Date Value Ref Range Status   06/11/2025 4.3 3.5 - 5.1 mmol/L Final   05/30/2024 4.0 3.5 - 5.1 mmol/L Final     Chloride   Date Value Ref Range Status   06/11/2025 107 95 - 110 mmol/L Final   05/30/2024 108 95 - 110 mmol/L Final     CO2   Date Value Ref Range Status   06/11/2025 23 23 - 29 mmol/L Final   05/30/2024 23 23 - 29 mmol/L Final     BUN   Date Value Ref Range Status   06/11/2025 9 6 - 20 mg/dL Final     Creatinine   Date Value Ref Range Status   06/11/2025 0.9 0.5 - 1.4 mg/dL Final     Glucose   Date Value Ref Range Status   06/11/2025 84 70 - 110 mg/dL Final   05/30/2024 80 70 - 110 mg/dL Final     Calcium   Date Value Ref Range Status   06/11/2025 9.0 8.7 - 10.5 mg/dL Final   05/30/2024 9.7 8.7 - 10.5 mg/dL Final     Magnesium   Date Value Ref Range Status   05/30/2024 2.0 1.6 - 2.6 mg/dL Final     LFTs:   Protein Total   Date Value Ref Range Status   06/11/2025 7.4 6.0 - 8.4 gm/dL Final     Total Protein   Date Value Ref Range Status   05/30/2024 7.6 6.0 - 8.4 g/dL Final     Albumin   Date Value Ref Range Status   06/11/2025 3.9 3.5 - 5.2 g/dL Final   05/30/2024 3.9 3.5 - 5.2 g/dL Final     Total Bilirubin   Date Value  "Ref Range Status   05/30/2024 0.4 0.1 - 1.0 mg/dL Final     Comment:     For infants and newborns, interpretation of results should be based  on gestational age, weight and in agreement with clinical  observations.    Premature Infant recommended reference ranges:  Up to 24 hours.............<8.0 mg/dL  Up to 48 hours............<12.0 mg/dL  3-5 days..................<15.0 mg/dL  6-29 days.................<15.0 mg/dL       Bilirubin Total   Date Value Ref Range Status   06/11/2025 0.4 0.1 - 1.0 mg/dL Final     Comment:     For infants and newborns, interpretation of results should be based   on gestational age, weight and in agreement with clinical   observations.    Premature Infant recommended reference ranges:   0-24 hours:  <8.0 mg/dL   24-48 hours: <12.0 mg/dL   3-5 days:    <15.0 mg/dL   6-29 days:   <15.0 mg/dL     AST   Date Value Ref Range Status   06/11/2025 15 11 - 45 unit/L Final   05/30/2024 19 10 - 40 U/L Final     Alkaline Phosphatase   Date Value Ref Range Status   05/30/2024 68 55 - 135 U/L Final     ALP   Date Value Ref Range Status   06/11/2025 54 40 - 150 unit/L Final     ALT   Date Value Ref Range Status   06/11/2025 9 (L) 10 - 44 unit/L Final   05/30/2024 14 10 - 44 U/L Final     Coags: No results found for: "INR", "PROTIME", "PTT"  FLP:      Lab Results   Component Value Date    CHOL 176 06/11/2025    CHOL 184 05/30/2024    CHOL 185 04/28/2022     Lab Results   Component Value Date    HDL 51 06/11/2025    HDL 59 05/30/2024    HDL 76 (H) 04/28/2022     Lab Results   Component Value Date    LDLCALC 114.6 06/11/2025    LDLCALC 112.8 05/30/2024    LDLCALC 96.6 04/28/2022     Lab Results   Component Value Date    TRIG 52 06/11/2025    TRIG 61 05/30/2024    TRIG 62 04/28/2022     Lab Results   Component Value Date    CHOLHDL 29.0 06/11/2025    CHOLHDL 32.1 05/30/2024    CHOLHDL 41.1 04/28/2022      DM:      Hemoglobin A1C   Date Value Ref Range Status   05/30/2024 5.4 4.0 - 5.6 % Final     Comment: "     ADA Screening Guidelines:  5.7-6.4%  Consistent with prediabetes  >or=6.5%  Consistent with diabetes    High levels of fetal hemoglobin interfere with the HbA1C  assay. Heterozygous hemoglobin variants (HbS, HgC, etc)do  not significantly interfere with this assay.   However, presence of multiple variants may affect accuracy.     04/28/2022 5.3 4.0 - 5.6 % Final     Comment:     ADA Screening Guidelines:  5.7-6.4%  Consistent with prediabetes  >or=6.5%  Consistent with diabetes    High levels of fetal hemoglobin interfere with the HbA1C  assay. Heterozygous hemoglobin variants (HbS, HgC, etc)do  not significantly interfere with this assay.   However, presence of multiple variants may affect accuracy.     11/25/2020 5.3 4.0 - 5.6 % Final     Comment:     ADA Screening Guidelines:  5.7-6.4%  Consistent with prediabetes  >or=6.5%  Consistent with diabetes  High levels of fetal hemoglobin interfere with the HbA1C  assay. Heterozygous hemoglobin variants (HbS, HgC, etc)do  not significantly interfere with this assay.   However, presence of multiple variants may affect accuracy.       Hemoglobin A1c   Date Value Ref Range Status   06/11/2025 5.4 4.0 - 5.6 % Final     Comment:     ADA Screening Guidelines:  5.7-6.4%  Consistent with prediabetes  >=6.5%  Consistent with diabetes    High levels of fetal hemoglobin interfere with the HbA1C  assay. Heterozygous hemoglobin variants (HbS, HgC, etc)do  not significantly interfere with this assay.   However, presence of multiple variants may affect accuracy.     LDL Cholesterol   Date Value Ref Range Status   06/11/2025 114.6 63.0 - 159.0 mg/dL Final     Comment:     The National Cholesterol Education Program (NCEP) has set the  following guidelines (reference values) for LDL Cholesterol:  Optimal.......................<130 mg/dL  Borderline High...............130-159 mg/dL  High..........................160-189 mg/dL  Very High.....................>190 mg/dL  LDL calculated  "using the Friedewald equation.     Creatinine   Date Value Ref Range Status   06/11/2025 0.9 0.5 - 1.4 mg/dL Final     Thyroid:   TSH   Date Value Ref Range Status   06/11/2025 1.152 0.400 - 4.000 uIU/mL Final   02/14/2025 1.358 0.400 - 4.000 uIU/mL Final     Anemia:   Vitamin B-12   Date Value Ref Range Status   02/14/2025 325 210 - 950 pg/mL Final     Cardiac: No results found for: "TROPONINI", "CKTOTAL", "CKMB", "BNP"  Urinalysis:   Color, UA   Date Value Ref Range Status   06/11/2025 Yellow Straw, Alexa, Yellow, Light-Orange Final   05/30/2024 Yellow Yellow, Straw, Alexa Final     Spec Grav UA   Date Value Ref Range Status   06/11/2025 1.030 1.005 - 1.030 Final     Nitrite, UA   Date Value Ref Range Status   05/30/2024 Negative Negative Final     Nitrites, UA   Date Value Ref Range Status   06/11/2025 Positive (A) Negative Final     Ketones, UA   Date Value Ref Range Status   05/30/2024 Negative Negative Final     Urobilinogen, UA   Date Value Ref Range Status   06/11/2025 Negative <2.0 EU/dL Final     WBC, UA   Date Value Ref Range Status   06/11/2025 10 (H) 0 - 5 /HPF Final   05/30/2024 3 0 - 5 /hpf Final       Other Results:  EKG (my interpretation):     Radiology:  Mammo Digital Screening Bilat w/ Alex  Narrative: Facility:  [facility] [address] [place] [address]    Name: Tato Godinez    MRN: [id number]    Result:  Mammo Digital Screening Bilat w/ Alex    History:  Patient is 44 y.o. and is seen for a screening mammogram.    Films Compared:  Compared to: 08/07/2024 Mammo Digital Diagnostic Left with Alex,   11/24/2023 Mammo Digital Screening Bilat w/ Alex, and 11/22/2022 Mammo   Digital Screening Bilat w/ Alex     Findings:  This procedure was performed using tomosynthesis.   Computer-aided detection was utilized in the interpretation of this   examination.    There are scattered areas of fibroglandular density. There is no evidence   of suspicious masses, " microcalcifications or architectural distortion.   Findings are stable.   Impression:    No mammographic evidence of malignancy.    BI-RADS Category 1: Negative    Recommendation:  Routine screening mammogram in 1 year is recommended.    Your estimated lifetime risk of breast cancer (to age 85) based on   Tyrer-Cuzick risk assessment model is 5.51%.  According to the American   Cancer Society, patients with a lifetime breast cancer risk of 20% or   higher might benefit from supplemental screening tests, such as screening   breast MRI.    Claus Nieto MD          Assessment/Plan     Tato Godinez is a 44 y.o. female with:  1. Preventative health care  Assessment & Plan:    Get COVID vaccine      2. Obesity (BMI 30-39.9)  Assessment & Plan:  Cont compounded Mounjaro 2.5mg SQ weekly     Exercise 30 min 5 times per week, decrease portion sizes by 50%; encourage plant-based diet;  drink 6-8 glasses of water daily, avoid fast foods, creamy, rich foods soledad ice cream, cheese creamy salad dressings;avoid eating 3 hours prior to bedtime; consider 8-10 hour intermittent diet; avoid simple sugars soledad white bread, rice, potatoes, noodles/pasta; no sweetened drinks.    Reduce alcohol to max 1-2 drinks per day (1 drink = 12 beer;  1 oz hard liquor; 5 oz wine)    Limit eating at restaurants to once per week; avoid fast foods, junk foods, impulsive eating. Drink 1 glass of lukewarm water before eating. Chew 100 times before swallowing food.     Use the Lose It Kenia to track calories and aim for eating less than 1200 calories per day.        3. Situational anxiety  Assessment & Plan:  Cont mag glycinate 400mg qhs and takes hydroxyzine 10mg tid prn  Start to meditate, journal, exposure to natural sunlight, exercise 30 min 5 times per week                  I spent a total of 28 minutes on the day of the visit.This includes face to face time and non-face to face time preparing to see the patient (eg, review of tests),  obtaining and/or reviewing separately obtained history, documenting clinical information in the electronic or other health record, independently interpreting results and communicating results to the patient/family/caregiver, or care coordinator.   Code Status:     Florence De La Torre MD

## 2025-06-19 ENCOUNTER — OFFICE VISIT (OUTPATIENT)
Dept: PRIMARY CARE CLINIC | Facility: CLINIC | Age: 45
End: 2025-06-19
Payer: COMMERCIAL

## 2025-06-19 VITALS
HEIGHT: 62 IN | BODY MASS INDEX: 36.63 KG/M2 | HEART RATE: 79 BPM | SYSTOLIC BLOOD PRESSURE: 114 MMHG | OXYGEN SATURATION: 98 % | RESPIRATION RATE: 14 BRPM | WEIGHT: 199.06 LBS | DIASTOLIC BLOOD PRESSURE: 68 MMHG

## 2025-06-19 DIAGNOSIS — F41.8 SITUATIONAL ANXIETY: ICD-10-CM

## 2025-06-19 DIAGNOSIS — Z00.00 PREVENTATIVE HEALTH CARE: Primary | ICD-10-CM

## 2025-06-19 DIAGNOSIS — E66.9 OBESITY (BMI 30-39.9): ICD-10-CM

## 2025-06-19 PROCEDURE — 3044F HG A1C LEVEL LT 7.0%: CPT | Mod: CPTII,S$GLB,, | Performed by: INTERNAL MEDICINE

## 2025-06-19 PROCEDURE — 99396 PREV VISIT EST AGE 40-64: CPT | Mod: S$GLB,,, | Performed by: INTERNAL MEDICINE

## 2025-06-19 PROCEDURE — 3074F SYST BP LT 130 MM HG: CPT | Mod: CPTII,S$GLB,, | Performed by: INTERNAL MEDICINE

## 2025-06-19 PROCEDURE — 3078F DIAST BP <80 MM HG: CPT | Mod: CPTII,S$GLB,, | Performed by: INTERNAL MEDICINE

## 2025-06-19 PROCEDURE — 3008F BODY MASS INDEX DOCD: CPT | Mod: CPTII,S$GLB,, | Performed by: INTERNAL MEDICINE

## 2025-06-19 PROCEDURE — 1159F MED LIST DOCD IN RCRD: CPT | Mod: CPTII,S$GLB,, | Performed by: INTERNAL MEDICINE

## 2025-06-19 PROCEDURE — 99999 PR PBB SHADOW E&M-EST. PATIENT-LVL IV: CPT | Mod: PBBFAC,,, | Performed by: INTERNAL MEDICINE

## 2025-06-19 RX ORDER — TIRZEPATIDE 2.5 MG/.5ML
2.5 INJECTION, SOLUTION SUBCUTANEOUS
COMMUNITY

## 2025-06-19 RX ORDER — HYDROXYZINE HYDROCHLORIDE 10 MG/1
TABLET, FILM COATED ORAL
COMMUNITY
Start: 2025-02-14

## 2025-06-19 NOTE — PATIENT INSTRUCTIONS
Preventative health care  Assessment & Plan:    Get COVID vaccine      Obesity (BMI 30-39.9)  Assessment & Plan:  Cont compounded Mounjaro 2.5mg SQ weekly     Exercise 30 min 5 times per week, decrease portion sizes by 50%; encourage plant-based diet;  drink 6-8 glasses of water daily, avoid fast foods, creamy, rich foods soledad ice cream, cheese creamy salad dressings;avoid eating 3 hours prior to bedtime; consider 8-10 hour intermittent diet; avoid simple sugars soledad white bread, rice, potatoes, noodles/pasta; no sweetened drinks.    Reduce alcohol to max 1-2 drinks per day (1 drink = 12 beer;  1 oz hard liquor; 5 oz wine)    Limit eating at restaurants to once per week; avoid fast foods, junk foods, impulsive eating. Drink 1 glass of lukewarm water before eating. Chew 100 times before swallowing food.     Use the Lose It Kenia to track calories and aim for eating less than 1200 calories per day.        Situational anxiety  Assessment & Plan:  Cont mag glycinate 400mg qhs and takes hydroxyzine 10mg tid prn  Start to meditate, journal, exposure to natural sunlight, exercise 30 min 5 times per week

## 2025-06-19 NOTE — ASSESSMENT & PLAN NOTE
Cont mag glycinate 400mg qhs and takes hydroxyzine 10mg tid prn  Start to meditate, journal, exposure to natural sunlight, exercise 30 min 5 times per week

## 2025-06-19 NOTE — ASSESSMENT & PLAN NOTE
Cont compounded Mounjaro 2.5mg SQ weekly     Exercise 30 min 5 times per week, decrease portion sizes by 50%; encourage plant-based diet;  drink 6-8 glasses of water daily, avoid fast foods, creamy, rich foods soledad ice cream, cheese creamy salad dressings;avoid eating 3 hours prior to bedtime; consider 8-10 hour intermittent diet; avoid simple sugars soledad white bread, rice, potatoes, noodles/pasta; no sweetened drinks.    Reduce alcohol to max 1-2 drinks per day (1 drink = 12 beer;  1 oz hard liquor; 5 oz wine)    Limit eating at restaurants to once per week; avoid fast foods, junk foods, impulsive eating. Drink 1 glass of lukewarm water before eating. Chew 100 times before swallowing food.     Use the Lose It Kenia to track calories and aim for eating less than 1200 calories per day.

## 2025-06-26 NOTE — PROGRESS NOTES
[facility]   Physical Therapy Treatment Note      Name: Tato Godinez  Clinic Number: [id number]    Therapy Diagnosis:   Encounter Diagnosis   Name Primary?    Acute pain of right knee Yes     Physician: Lamine Early MD    Visit Date: 12/15/2023  Physician Orders: PT Eval and Treat   Medical Diagnosis from Referral: 3.411A (ICD-10-CM) - Tear of medial collateral ligament of right knee, initial encounter  Evaluation Date: 6/30/2023  Authorization Period Expiration: 7/30/2023  Plan of Care Expiration: 9/30/2023  Progress Note Due: 8/10/2023  Visit # / Visits authorized: 34/40     Time In: 1334  Time Out: 1430  Total Billable Time: 46 minutes    FOTO IE 6/30: 29  FOTO 2: 8/22: 66  FOTO 11/3: 74  FOTO goal: 69    Precautions: Standard, post-op MCL repair protocol    Procedure by Sharath: 6/28/23   1. Right knee MCL repair with internal brace  2. Diagnostic right knee arthroscopy     The postop plan for the patient is to follow our MCL repair protocol.  Hinged knee brace locked in extension weightbear as tolerated for now.    Subjective     Pt reports: her knee is doing well. No pain, but she feels like it's still stiff at times.     She was compliant with home exercise program.  Response to previous treatment: mild-moderate quad soreness  Functional change: no significant change at this time    Pain: not verbalized/10  Location: right knee      Objective      DOS: 6/28/23  POD: 5 months, 8 days as of 12/6      Return to jog assessment 12/15/2023:  1st phase of [place] sports cord 1 minute - passed  10 minute uphill walk 7.5 incline  No pain with either, slight valgus on involved limb compared to contralateral side    Observation 10/31/2023:  Range of Motion   Knee   Right AROM/PROM  Left AROM/PROM    Flexion 125 degrees / 125 degrees  125 degrees / 125 degrees    Extension  +3 degrees / +3 degrees  +3 degrees / +3 degrees      Don Testing (10/31/2023):   Right  Left    Trial 1 40.6  ambulatory "kg 42.8 kg   Trial 2 40.5 kg 44.5 kg   Trial 3 39.3 kg  43.2 kg    Average  40.1 kg  43.5 kg     12/6 objective strength testing:   Knee Extension iso at 60° (kg)                  Right Left                1 41.9 46.6                2 45.4 44.4                3 45.9 52.9                   Avg 44.4 48.0  7.4% strength deficit     Knee Flexion iso at 60° (kg)                  Right Left                1 26.6 30.5                2 26.5 29.4                3 27.9 29.9                Avg 27.0 29.9   8% strength deficit              Treatment     Tato received the treatments listed below:      therapeutic exercises to develop ROM, strength, flexibility, endurance for 00 minutes including:    NP:  LLLD heel prop 8# x 5'  DL leg press 150# 2 x 10  SL leg press 100# 2 x 10  LAQ matrix 35# 3 x 10  HS curl 25 lbs 3 x 10  Ext hinge strap stretch 5 x 20"    manual therapy techniques: joint mobilizations and/or soft tissue mobilizations were applied to the: knee for 03 minutes, including:     Assessment of knee   Patellar mobilizations all planes IV  Infrapatellar fat pad mobs     Not Today:   Gr III-IV posterior tibiofemoral glide with tibial internal rotation      neuromuscular re-education activities to improve: motor control, balance, muscle activation, proprioception, posture for 25 minutes. The following activities were included:    Clam plank 10 x 10"  SL bridge 5" hold 3 x 10  Wall sit    NP:  Figure 4 SL bridge 2 x 8 naseem   Sball HS curl 3 x burn   S/L hip ABD 3 x 15 x 3"  Bridges YTB 2 x 10 x 10"  S/L clams YTB 15 x 10" naseem   DL Bridge into HS curl 3 x 12 reps   Sneaky lunges 2 x 10 x 5" holds   Standing clams GTB 3 x 6 naseem     therapeutic activities to improve functional performance for  minutes, including:  Return to run assessment      NP:  DL squats hex bar +40# 4 x 8-10  Ecc SL leg press 120# 3 x 12  DL squats on ramp 20# 3 x 12-15  Split squats c single UE support 10# 3 x 8 naseem   Step downs 5-inch with mirror " for visual feedback 3 x6-8 reps   SL Leg press 150 lbs 4 x 8      Patient Education and Home Exercises       Education provided:   - HEP     Written Home Exercises Provided: Continue prior HEP. Exercises were reviewed and Tato was able to demonstrate them prior to the end of the session.  Tato demonstrated good  understanding of the education provided. See EMR under Patient Instructions for exercises provided during therapy sessions.    Assessment     Able to complete the return to jog assessment though she has some lateral hip deficits. Updated HEP. Next time trial interval jogging on the treadmill at a 4 to 1 walk/jog ratio    Tato is progressing well towards her goals.   Pt prognosis is Excellent.     Pt will continue to benefit from skilled outpatient physical therapy to address the deficits listed in the problem list box on initial evaluation, provide pt/family education and to maximize pt's level of independence in the home and community environment.     Pt's spiritual, cultural and educational needs considered and pt agreeable to plan of care and goals.     Anticipated barriers to physical therapy: None    GOALS: Short Term Goals:  6 weeks  1.Report decreased knee pain  < / =  3/10  to increase tolerance for exercise  2. Increase knee ROM to 125 degrees flexion in order to be able to perform ADLs without difficulty. - MET  3. Increase strength by 1/3 MMT grade in quadriceps  to increase tolerance for ADL and work activities.  4. Pt to tolerate HEP to improve ROM and independence with ADL's - MET     Long Term Goals: 24 weeks  1.Report decreased knee pain < / = 0/10  to increase tolerance for ADLs  2.Patient goal: Return to work in August  3.Increase strength to >/= 4+/5 in quadriceps  to increase tolerance for ADL and work activities.  4. Pt will report at CJ level (20-40% impaired) on FOTO knee to demonstrate increase in LE function with every day tasks.      Plan     Outpatient Physical Therapy 2  times weekly for 10 weeks to include the following interventions: manual therapy, therapeutic exercise, therapeutic activities, and neuromuscular re-education.    Gal Bran, PT

## (undated) DEVICE — PAD CAST SPECIALIST STRL 6

## (undated) DEVICE — DRAPE STERI U-SHAPED 47X51IN

## (undated) DEVICE — GAUZE SPONGE 4X4 12PLY

## (undated) DEVICE — GOWN ECLIPSE REINF LV4 XLNG XL

## (undated) DEVICE — DRAPE STERI INSTRUMENT 1018

## (undated) DEVICE — TOURNIQUET SB QC DP 34X4IN

## (undated) DEVICE — COVER TABLE REINF 50X90IN

## (undated) DEVICE — SOL NACL IRR 1000ML BTL

## (undated) DEVICE — BANDAGE ESMARK 6X12

## (undated) DEVICE — Device

## (undated) DEVICE — CONTAINER SPECIMEN OR STER 4OZ

## (undated) DEVICE — BOWL STERILE LARGE 32OZ

## (undated) DEVICE — ADHESIVE MASTISOL VIAL 48/BX

## (undated) DEVICE — NDL 18GA X1 1/2 REG BEVEL

## (undated) DEVICE — ADHESIVE DERMABOND ADVANCED

## (undated) DEVICE — SUT VICRYL PLUS 0 CT1 18IN

## (undated) DEVICE — BLADE SURG STAINLESS STEEL #15

## (undated) DEVICE — APPLICATOR CHLORAPREP ORN 26ML

## (undated) DEVICE — SUT CTD VICRYL 0 UND BR

## (undated) DEVICE — PENCIL ELECTROSURG HOLST W/BLD

## (undated) DEVICE — TUBE SET INFLOW/OUTFLOW

## (undated) DEVICE — PAD ELECTRODE STER 1.5X3

## (undated) DEVICE — SOL NACL IRR 3000ML

## (undated) DEVICE — ELECTRODE REM PLYHSV RETURN 9

## (undated) DEVICE — GLOVE BIOGEL SKINSENSE PI 7.5

## (undated) DEVICE — WRAP KNEE ACCU THERM GEL PACK

## (undated) DEVICE — COVER MAYO STAND REINFRCD 30

## (undated) DEVICE — SUT MCRYL PLUS 4-0 PS2 27IN

## (undated) DEVICE — CLOSURE SKIN STERI STRIP 1/2X4

## (undated) DEVICE — PAD ABDOMINAL STERILE 8X10IN

## (undated) DEVICE — GLOVE BIOGEL SKINSENSE PI 8.0

## (undated) DEVICE — SYR 30CC LUER LOCK

## (undated) DEVICE — DRAPE ARTHSCP FLD CTRL POUCH

## (undated) DEVICE — DRESSING XEROFORM FOIL PK 1X8

## (undated) DEVICE — NDL 26.5 TAPR CRV XLOOP

## (undated) DEVICE — SKIN MARKER DEVON 160

## (undated) DEVICE — YANKAUER OPEN TIP W/O VENT

## (undated) DEVICE — UNDERGLOVES BIOGEL PI SIZE 8

## (undated) DEVICE — SUT FIBERWIRE

## (undated) DEVICE — REPAIR KIT SMALL JOINT BIO TEN

## (undated) DEVICE — SYR IRRIGATION BULB STER 60ML

## (undated) DEVICE — BRACE KNEE T SCOPE PREMIER

## (undated) DEVICE — DRAPE TOP 53X102IN

## (undated) DEVICE — DRESSING AQUACEL AG ADV 3.5X6

## (undated) DEVICE — SUT VICRYL PLUS 3-0 SH 18IN